# Patient Record
Sex: MALE | Race: WHITE | HISPANIC OR LATINO | Employment: FULL TIME | URBAN - METROPOLITAN AREA
[De-identification: names, ages, dates, MRNs, and addresses within clinical notes are randomized per-mention and may not be internally consistent; named-entity substitution may affect disease eponyms.]

---

## 2024-06-26 ENCOUNTER — HOSPITAL ENCOUNTER (INPATIENT)
Facility: HOSPITAL | Age: 69
End: 2024-06-26
Attending: INTERNAL MEDICINE | Admitting: INTERNAL MEDICINE
Payer: COMMERCIAL

## 2024-06-26 ENCOUNTER — HOSPITAL ENCOUNTER (OUTPATIENT)
Facility: HOSPITAL | Age: 69
Setting detail: OUTPATIENT SURGERY
Discharge: ANOTHER HOSPITAL | End: 2024-06-26
Attending: INTERNAL MEDICINE | Admitting: INTERNAL MEDICINE
Payer: COMMERCIAL

## 2024-06-26 ENCOUNTER — APPOINTMENT (OUTPATIENT)
Dept: CARDIOLOGY | Facility: HOSPITAL | Age: 69
DRG: 235 | End: 2024-06-26
Payer: COMMERCIAL

## 2024-06-26 VITALS
HEIGHT: 68 IN | HEART RATE: 61 BPM | DIASTOLIC BLOOD PRESSURE: 65 MMHG | RESPIRATION RATE: 12 BRPM | BODY MASS INDEX: 22.13 KG/M2 | WEIGHT: 146 LBS | SYSTOLIC BLOOD PRESSURE: 117 MMHG | OXYGEN SATURATION: 97 %

## 2024-06-26 DIAGNOSIS — I42.9 CARDIOMYOPATHY, UNSPECIFIED TYPE (MULTI): ICD-10-CM

## 2024-06-26 DIAGNOSIS — I25.119 ATHEROSCLEROTIC HEART DISEASE OF NATIVE CORONARY ARTERY WITH UNSPECIFIED ANGINA PECTORIS (CMS-HCC): ICD-10-CM

## 2024-06-26 DIAGNOSIS — Z95.1 S/P CABG X 2: ICD-10-CM

## 2024-06-26 DIAGNOSIS — I25.5 ISCHEMIC CARDIOMYOPATHY: ICD-10-CM

## 2024-06-26 DIAGNOSIS — I20.9 ANGINA PECTORIS (CMS-HCC): ICD-10-CM

## 2024-06-26 DIAGNOSIS — R93.1 ABNORMAL FINDINGS ON DIAGNOSTIC IMAGING OF HEART AND CORONARY CIRCULATION: ICD-10-CM

## 2024-06-26 DIAGNOSIS — Z03.89 CORONARY ARTERY DISEASE (CAD) EXCLUDED: Primary | ICD-10-CM

## 2024-06-26 DIAGNOSIS — Z01.810 ENCOUNTER FOR PREPROCEDURAL CARDIOVASCULAR EXAMINATION: ICD-10-CM

## 2024-06-26 LAB
ABO GROUP (TYPE) IN BLOOD: NORMAL
ALBUMIN SERPL BCP-MCNC: 4.1 G/DL (ref 3.4–5)
ALP SERPL-CCNC: 66 U/L (ref 33–136)
ALT SERPL W P-5'-P-CCNC: 11 U/L (ref 10–52)
ANION GAP BLDV CALCULATED.4IONS-SCNC: 14 MMOL/L (ref 10–25)
ANION GAP SERPL CALC-SCNC: 15 MMOL/L (ref 10–20)
ANTIBODY SCREEN: NORMAL
APTT PPP: 28 SECONDS (ref 27–38)
AST SERPL W P-5'-P-CCNC: 16 U/L (ref 9–39)
ATRIAL RATE: 68 BPM
BASE EXCESS BLDV CALC-SCNC: -0.9 MMOL/L (ref -2–3)
BASOPHILS # BLD AUTO: 0.06 X10*3/UL (ref 0–0.1)
BASOPHILS NFR BLD AUTO: 0.9 %
BILIRUB SERPL-MCNC: 0.6 MG/DL (ref 0–1.2)
BODY TEMPERATURE: 37 DEGREES CELSIUS
BUN SERPL-MCNC: 22 MG/DL (ref 6–23)
CA-I BLDV-SCNC: 1.18 MMOL/L (ref 1.1–1.33)
CALCIUM SERPL-MCNC: 9.2 MG/DL (ref 8.6–10.6)
CHLORIDE BLDV-SCNC: 102 MMOL/L (ref 98–107)
CHLORIDE SERPL-SCNC: 103 MMOL/L (ref 98–107)
CHOLEST SERPL-MCNC: 132 MG/DL (ref 0–199)
CHOLESTEROL/HDL RATIO: 4.5
CO2 SERPL-SCNC: 24 MMOL/L (ref 21–32)
CREAT SERPL-MCNC: 0.87 MG/DL (ref 0.5–1.3)
EGFRCR SERPLBLD CKD-EPI 2021: >90 ML/MIN/1.73M*2
EOSINOPHIL # BLD AUTO: 0.17 X10*3/UL (ref 0–0.7)
EOSINOPHIL NFR BLD AUTO: 2.5 %
ERYTHROCYTE [DISTWIDTH] IN BLOOD BY AUTOMATED COUNT: 13.2 % (ref 11.5–14.5)
EST. AVERAGE GLUCOSE BLD GHB EST-MCNC: 117 MG/DL
GLUCOSE BLD MANUAL STRIP-MCNC: 85 MG/DL (ref 74–99)
GLUCOSE BLDV-MCNC: 79 MG/DL (ref 74–99)
GLUCOSE SERPL-MCNC: 70 MG/DL (ref 74–99)
HBA1C MFR BLD: 5.7 %
HCO3 BLDV-SCNC: 24.3 MMOL/L (ref 22–26)
HCT VFR BLD AUTO: 43.9 % (ref 41–52)
HCT VFR BLD EST: 43 % (ref 41–52)
HDLC SERPL-MCNC: 29.3 MG/DL
HGB BLD-MCNC: 14.6 G/DL (ref 13.5–17.5)
HGB BLDV-MCNC: 14.4 G/DL (ref 13.5–17.5)
IMM GRANULOCYTES # BLD AUTO: 0.02 X10*3/UL (ref 0–0.7)
IMM GRANULOCYTES NFR BLD AUTO: 0.3 % (ref 0–0.9)
INHALED O2 CONCENTRATION: 21 %
INR PPP: 1 (ref 0.9–1.1)
LACTATE BLDV-SCNC: 1.3 MMOL/L (ref 0.4–2)
LDLC SERPL CALC-MCNC: 69 MG/DL
LYMPHOCYTES # BLD AUTO: 2.16 X10*3/UL (ref 1.2–4.8)
LYMPHOCYTES NFR BLD AUTO: 31.8 %
MAGNESIUM SERPL-MCNC: 2.48 MG/DL (ref 1.6–2.4)
MCH RBC QN AUTO: 30.9 PG (ref 26–34)
MCHC RBC AUTO-ENTMCNC: 33.3 G/DL (ref 32–36)
MCV RBC AUTO: 93 FL (ref 80–100)
MONOCYTES # BLD AUTO: 0.47 X10*3/UL (ref 0.1–1)
MONOCYTES NFR BLD AUTO: 6.9 %
NEUTROPHILS # BLD AUTO: 3.92 X10*3/UL (ref 1.2–7.7)
NEUTROPHILS NFR BLD AUTO: 57.6 %
NON HDL CHOLESTEROL: 103 MG/DL (ref 0–149)
NRBC BLD-RTO: 0 /100 WBCS (ref 0–0)
OXYHGB MFR BLDV: 88.6 % (ref 45–75)
P AXIS: 9 DEGREES
P OFFSET: 173 MS
P ONSET: 115 MS
PCO2 BLDV: 41 MM HG (ref 41–51)
PH BLDV: 7.38 PH (ref 7.33–7.43)
PHOSPHATE SERPL-MCNC: 3.5 MG/DL (ref 2.5–4.9)
PLATELET # BLD AUTO: 172 X10*3/UL (ref 150–450)
PO2 BLDV: 67 MM HG (ref 35–45)
POTASSIUM BLDV-SCNC: 4.5 MMOL/L (ref 3.5–5.3)
POTASSIUM SERPL-SCNC: 4.8 MMOL/L (ref 3.5–5.3)
PR INTERVAL: 214 MS
PROT SERPL-MCNC: 7.2 G/DL (ref 6.4–8.2)
PROTHROMBIN TIME: 11.3 SECONDS (ref 9.8–12.8)
Q ONSET: 222 MS
QRS COUNT: 11 BEATS
QRS DURATION: 88 MS
QT INTERVAL: 430 MS
QTC CALCULATION(BAZETT): 457 MS
QTC FREDERICIA: 448 MS
R AXIS: 22 DEGREES
RBC # BLD AUTO: 4.73 X10*6/UL (ref 4.5–5.9)
RH FACTOR (ANTIGEN D): NORMAL
SAO2 % BLDV: 95 % (ref 45–75)
SODIUM BLDV-SCNC: 136 MMOL/L (ref 136–145)
SODIUM SERPL-SCNC: 137 MMOL/L (ref 136–145)
T AXIS: 151 DEGREES
T OFFSET: 437 MS
TRIGL SERPL-MCNC: 167 MG/DL (ref 0–149)
UFH PPP CHRO-ACNC: 0.2 IU/ML
VENTRICULAR RATE: 68 BPM
VLDL: 33 MG/DL (ref 0–40)
WBC # BLD AUTO: 6.8 X10*3/UL (ref 4.4–11.3)

## 2024-06-26 PROCEDURE — 93454 CORONARY ARTERY ANGIO S&I: CPT | Performed by: INTERNAL MEDICINE

## 2024-06-26 PROCEDURE — 99291 CRITICAL CARE FIRST HOUR: CPT | Performed by: INTERNAL MEDICINE

## 2024-06-26 PROCEDURE — 85025 COMPLETE CBC W/AUTO DIFF WBC: CPT

## 2024-06-26 PROCEDURE — 83036 HEMOGLOBIN GLYCOSYLATED A1C: CPT

## 2024-06-26 PROCEDURE — 84132 ASSAY OF SERUM POTASSIUM: CPT

## 2024-06-26 PROCEDURE — 36415 COLL VENOUS BLD VENIPUNCTURE: CPT

## 2024-06-26 PROCEDURE — 93005 ELECTROCARDIOGRAM TRACING: CPT

## 2024-06-26 PROCEDURE — 80061 LIPID PANEL: CPT

## 2024-06-26 PROCEDURE — C1760 CLOSURE DEV, VASC: HCPCS | Performed by: INTERNAL MEDICINE

## 2024-06-26 PROCEDURE — 2720000007 HC OR 272 NO HCPCS: Performed by: INTERNAL MEDICINE

## 2024-06-26 PROCEDURE — 84484 ASSAY OF TROPONIN QUANT: CPT

## 2024-06-26 PROCEDURE — 2550000001 HC RX 255 CONTRASTS: Performed by: INTERNAL MEDICINE

## 2024-06-26 PROCEDURE — 2500000004 HC RX 250 GENERAL PHARMACY W/ HCPCS (ALT 636 FOR OP/ED): Performed by: INTERNAL MEDICINE

## 2024-06-26 PROCEDURE — 84443 ASSAY THYROID STIM HORMONE: CPT | Performed by: STUDENT IN AN ORGANIZED HEALTH CARE EDUCATION/TRAINING PROGRAM

## 2024-06-26 PROCEDURE — C1894 INTRO/SHEATH, NON-LASER: HCPCS | Performed by: INTERNAL MEDICINE

## 2024-06-26 PROCEDURE — 2500000001 HC RX 250 WO HCPCS SELF ADMINISTERED DRUGS (ALT 637 FOR MEDICARE OP): Performed by: INTERNAL MEDICINE

## 2024-06-26 PROCEDURE — 85520 HEPARIN ASSAY: CPT

## 2024-06-26 PROCEDURE — 84439 ASSAY OF FREE THYROXINE: CPT | Performed by: STUDENT IN AN ORGANIZED HEALTH CARE EDUCATION/TRAINING PROGRAM

## 2024-06-26 PROCEDURE — 2020000001 HC ICU ROOM DAILY

## 2024-06-26 PROCEDURE — 2500000005 HC RX 250 GENERAL PHARMACY W/O HCPCS: Performed by: INTERNAL MEDICINE

## 2024-06-26 PROCEDURE — 85027 COMPLETE CBC AUTOMATED: CPT | Performed by: STUDENT IN AN ORGANIZED HEALTH CARE EDUCATION/TRAINING PROGRAM

## 2024-06-26 PROCEDURE — 2780000003 HC OR 278 NO HCPCS: Performed by: INTERNAL MEDICINE

## 2024-06-26 PROCEDURE — 83735 ASSAY OF MAGNESIUM: CPT

## 2024-06-26 PROCEDURE — 86850 RBC ANTIBODY SCREEN: CPT | Performed by: INTERNAL MEDICINE

## 2024-06-26 PROCEDURE — 7100000009 HC PHASE TWO TIME - INITIAL BASE CHARGE: Performed by: INTERNAL MEDICINE

## 2024-06-26 PROCEDURE — 82947 ASSAY GLUCOSE BLOOD QUANT: CPT

## 2024-06-26 PROCEDURE — G0269 OCCLUSIVE DEVICE IN VEIN ART: HCPCS | Mod: TC | Performed by: INTERNAL MEDICINE

## 2024-06-26 PROCEDURE — 2500000004 HC RX 250 GENERAL PHARMACY W/ HCPCS (ALT 636 FOR OP/ED)

## 2024-06-26 PROCEDURE — 2500000002 HC RX 250 W HCPCS SELF ADMINISTERED DRUGS (ALT 637 FOR MEDICARE OP, ALT 636 FOR OP/ED)

## 2024-06-26 PROCEDURE — 7100000010 HC PHASE TWO TIME - EACH INCREMENTAL 1 MINUTE: Performed by: INTERNAL MEDICINE

## 2024-06-26 PROCEDURE — 85610 PROTHROMBIN TIME: CPT

## 2024-06-26 PROCEDURE — 84100 ASSAY OF PHOSPHORUS: CPT

## 2024-06-26 PROCEDURE — 86923 COMPATIBILITY TEST ELECTRIC: CPT

## 2024-06-26 PROCEDURE — 86901 BLOOD TYPING SEROLOGIC RH(D): CPT | Performed by: INTERNAL MEDICINE

## 2024-06-26 PROCEDURE — 99152 MOD SED SAME PHYS/QHP 5/>YRS: CPT | Performed by: INTERNAL MEDICINE

## 2024-06-26 RX ORDER — FENTANYL CITRATE 50 UG/ML
INJECTION, SOLUTION INTRAMUSCULAR; INTRAVENOUS AS NEEDED
Status: DISCONTINUED | OUTPATIENT
Start: 2024-06-26 | End: 2024-06-26 | Stop reason: HOSPADM

## 2024-06-26 RX ORDER — DEXTROSE 50 % IN WATER (D50W) INTRAVENOUS SYRINGE
12.5
Status: DISCONTINUED | OUTPATIENT
Start: 2024-06-26 | End: 2024-06-28

## 2024-06-26 RX ORDER — SODIUM CHLORIDE 9 MG/ML
INJECTION, SOLUTION INTRAVENOUS CONTINUOUS PRN
Status: DISCONTINUED | OUTPATIENT
Start: 2024-06-26 | End: 2024-06-26 | Stop reason: HOSPADM

## 2024-06-26 RX ORDER — ASPIRIN 81 MG/1
81 TABLET ORAL DAILY
Status: DISCONTINUED | OUTPATIENT
Start: 2024-06-26 | End: 2024-06-28

## 2024-06-26 RX ORDER — METOPROLOL SUCCINATE 25 MG/1
25 TABLET, EXTENDED RELEASE ORAL DAILY
Status: ON HOLD | COMMUNITY

## 2024-06-26 RX ORDER — NITROGLYCERIN 0.4 MG/1
TABLET SUBLINGUAL AS NEEDED
Status: DISCONTINUED | OUTPATIENT
Start: 2024-06-26 | End: 2024-06-26 | Stop reason: HOSPADM

## 2024-06-26 RX ORDER — ROSUVASTATIN CALCIUM 10 MG/1
10 TABLET, COATED ORAL DAILY
Status: ON HOLD | COMMUNITY

## 2024-06-26 RX ORDER — ASPIRIN 81 MG/1
81 TABLET ORAL DAILY
Status: ON HOLD | COMMUNITY

## 2024-06-26 RX ORDER — LIDOCAINE HYDROCHLORIDE 20 MG/ML
INJECTION, SOLUTION INFILTRATION; PERINEURAL AS NEEDED
Status: DISCONTINUED | OUTPATIENT
Start: 2024-06-26 | End: 2024-06-26 | Stop reason: HOSPADM

## 2024-06-26 RX ORDER — NITROGLYCERIN 0.4 MG/1
0.4 TABLET SUBLINGUAL EVERY 5 MIN PRN
Status: ON HOLD | COMMUNITY

## 2024-06-26 RX ORDER — DEXTROSE 50 % IN WATER (D50W) INTRAVENOUS SYRINGE
25
Status: DISCONTINUED | OUTPATIENT
Start: 2024-06-26 | End: 2024-06-28

## 2024-06-26 RX ORDER — INSULIN LISPRO 100 [IU]/ML
0-5 INJECTION, SOLUTION INTRAVENOUS; SUBCUTANEOUS
Status: DISCONTINUED | OUTPATIENT
Start: 2024-06-26 | End: 2024-06-28

## 2024-06-26 RX ORDER — HEPARIN SODIUM 10000 [USP'U]/100ML
INJECTION, SOLUTION INTRAVENOUS
Status: DISPENSED
Start: 2024-06-26 | End: 2024-06-27

## 2024-06-26 RX ORDER — MIDAZOLAM HYDROCHLORIDE 1 MG/ML
INJECTION, SOLUTION INTRAMUSCULAR; INTRAVENOUS AS NEEDED
Status: DISCONTINUED | OUTPATIENT
Start: 2024-06-26 | End: 2024-06-26 | Stop reason: HOSPADM

## 2024-06-26 RX ORDER — METOPROLOL SUCCINATE 25 MG/1
25 TABLET, EXTENDED RELEASE ORAL DAILY
Status: DISCONTINUED | OUTPATIENT
Start: 2024-06-26 | End: 2024-06-27

## 2024-06-26 RX ORDER — ASPIRIN 325 MG
325 TABLET ORAL DAILY
Status: DISCONTINUED | OUTPATIENT
Start: 2024-06-26 | End: 2024-06-26 | Stop reason: HOSPADM

## 2024-06-26 RX ORDER — ROSUVASTATIN CALCIUM 40 MG/1
40 TABLET, COATED ORAL NIGHTLY
Status: DISCONTINUED | OUTPATIENT
Start: 2024-06-26 | End: 2024-06-28

## 2024-06-26 RX ORDER — HEPARIN SODIUM 10000 [USP'U]/100ML
0-4000 INJECTION, SOLUTION INTRAVENOUS CONTINUOUS
Status: DISCONTINUED | OUTPATIENT
Start: 2024-06-26 | End: 2024-06-28

## 2024-06-26 SDOH — HEALTH STABILITY: PHYSICAL HEALTH: ON AVERAGE, HOW MANY MINUTES DO YOU ENGAGE IN EXERCISE AT THIS LEVEL?: 30 MIN

## 2024-06-26 SDOH — SOCIAL STABILITY: SOCIAL INSECURITY: ABUSE: ADULT

## 2024-06-26 SDOH — SOCIAL STABILITY: SOCIAL INSECURITY: DO YOU FEEL ANYONE HAS EXPLOITED OR TAKEN ADVANTAGE OF YOU FINANCIALLY OR OF YOUR PERSONAL PROPERTY?: NO

## 2024-06-26 SDOH — SOCIAL STABILITY: SOCIAL INSECURITY: DOES ANYONE TRY TO KEEP YOU FROM HAVING/CONTACTING OTHER FRIENDS OR DOING THINGS OUTSIDE YOUR HOME?: NO

## 2024-06-26 SDOH — SOCIAL STABILITY: SOCIAL INSECURITY: DO YOU FEEL UNSAFE GOING BACK TO THE PLACE WHERE YOU ARE LIVING?: NO

## 2024-06-26 SDOH — SOCIAL STABILITY: SOCIAL INSECURITY: WERE YOU ABLE TO COMPLETE ALL THE BEHAVIORAL HEALTH SCREENINGS?: NO

## 2024-06-26 SDOH — SOCIAL STABILITY: SOCIAL INSECURITY: HAVE YOU HAD THOUGHTS OF HARMING ANYONE ELSE?: NO

## 2024-06-26 SDOH — HEALTH STABILITY: PHYSICAL HEALTH: ON AVERAGE, HOW MANY DAYS PER WEEK DO YOU ENGAGE IN MODERATE TO STRENUOUS EXERCISE (LIKE A BRISK WALK)?: 3 DAYS

## 2024-06-26 SDOH — SOCIAL STABILITY: SOCIAL INSECURITY: ARE YOU OR HAVE YOU BEEN THREATENED OR ABUSED PHYSICALLY, EMOTIONALLY, OR SEXUALLY BY ANYONE?: NO

## 2024-06-26 SDOH — HEALTH STABILITY: MENTAL HEALTH: EXPERIENCED ANY OF THE FOLLOWING LIFE EVENTS: OTHER (COMMENT)

## 2024-06-26 SDOH — SOCIAL STABILITY: SOCIAL INSECURITY: HAVE YOU HAD ANY THOUGHTS OF HARMING ANYONE ELSE?: NO

## 2024-06-26 SDOH — SOCIAL STABILITY: SOCIAL INSECURITY: HAS ANYONE EVER THREATENED TO HURT YOUR FAMILY OR YOUR PETS?: NO

## 2024-06-26 SDOH — SOCIAL STABILITY: SOCIAL INSECURITY: ARE THERE ANY APPARENT SIGNS OF INJURIES/BEHAVIORS THAT COULD BE RELATED TO ABUSE/NEGLECT?: NO

## 2024-06-26 ASSESSMENT — PAIN SCALES - GENERAL
PAINLEVEL_OUTOF10: 0 - NO PAIN

## 2024-06-26 ASSESSMENT — ACTIVITIES OF DAILY LIVING (ADL)
ADEQUATE_TO_COMPLETE_ADL: YES
FEEDING YOURSELF: INDEPENDENT
PATIENT'S MEMORY ADEQUATE TO SAFELY COMPLETE DAILY ACTIVITIES?: YES
BATHING: INDEPENDENT
JUDGMENT_ADEQUATE_SAFELY_COMPLETE_DAILY_ACTIVITIES: YES
TOILETING: INDEPENDENT
DRESSING YOURSELF: INDEPENDENT
HEARING - RIGHT EAR: HEARING AID
GROOMING: INDEPENDENT
HEARING - LEFT EAR: FUNCTIONAL
LACK_OF_TRANSPORTATION: PATIENT UNABLE TO ANSWER
ASSISTIVE_DEVICE: HEARING AID - RIGHT
WALKS IN HOME: INDEPENDENT

## 2024-06-26 ASSESSMENT — LIFESTYLE VARIABLES
HOW OFTEN DO YOU HAVE A DRINK CONTAINING ALCOHOL: NEVER
SKIP TO QUESTIONS 9-10: 1
AUDIT-C TOTAL SCORE: 0
AUDIT-C TOTAL SCORE: 0
HOW MANY STANDARD DRINKS CONTAINING ALCOHOL DO YOU HAVE ON A TYPICAL DAY: PATIENT DOES NOT DRINK
SUBSTANCE_ABUSE_PAST_12_MONTHS: NO
HOW OFTEN DO YOU HAVE 6 OR MORE DRINKS ON ONE OCCASION: NEVER
PRESCIPTION_ABUSE_PAST_12_MONTHS: NO

## 2024-06-26 ASSESSMENT — COGNITIVE AND FUNCTIONAL STATUS - GENERAL
PATIENT BASELINE BEDBOUND: NO
MOVING TO AND FROM BED TO CHAIR: A LITTLE
HELP NEEDED FOR BATHING: A LITTLE
EATING MEALS: A LITTLE
TOILETING: A LITTLE
MOVING FROM LYING ON BACK TO SITTING ON SIDE OF FLAT BED WITH BEDRAILS: A LITTLE
TURNING FROM BACK TO SIDE WHILE IN FLAT BAD: A LITTLE
PERSONAL GROOMING: A LITTLE
WALKING IN HOSPITAL ROOM: A LITTLE
CLIMB 3 TO 5 STEPS WITH RAILING: A LITTLE
DAILY ACTIVITIY SCORE: 18
DRESSING REGULAR LOWER BODY CLOTHING: A LITTLE
DRESSING REGULAR UPPER BODY CLOTHING: A LITTLE
STANDING UP FROM CHAIR USING ARMS: A LITTLE
MOBILITY SCORE: 18

## 2024-06-26 ASSESSMENT — COLUMBIA-SUICIDE SEVERITY RATING SCALE - C-SSRS
1. IN THE PAST MONTH, HAVE YOU WISHED YOU WERE DEAD OR WISHED YOU COULD GO TO SLEEP AND NOT WAKE UP?: NO
6. HAVE YOU EVER DONE ANYTHING, STARTED TO DO ANYTHING, OR PREPARED TO DO ANYTHING TO END YOUR LIFE?: NO
6. HAVE YOU EVER DONE ANYTHING, STARTED TO DO ANYTHING, OR PREPARED TO DO ANYTHING TO END YOUR LIFE?: NO
2. HAVE YOU ACTUALLY HAD ANY THOUGHTS OF KILLING YOURSELF?: NO
1. IN THE PAST MONTH, HAVE YOU WISHED YOU WERE DEAD OR WISHED YOU COULD GO TO SLEEP AND NOT WAKE UP?: NO
2. HAVE YOU ACTUALLY HAD ANY THOUGHTS OF KILLING YOURSELF?: NO

## 2024-06-26 ASSESSMENT — PATIENT HEALTH QUESTIONNAIRE - PHQ9
2. FEELING DOWN, DEPRESSED OR HOPELESS: NOT AT ALL
1. LITTLE INTEREST OR PLEASURE IN DOING THINGS: NOT AT ALL
SUM OF ALL RESPONSES TO PHQ9 QUESTIONS 1 & 2: 0

## 2024-06-26 ASSESSMENT — PAIN - FUNCTIONAL ASSESSMENT
PAIN_FUNCTIONAL_ASSESSMENT: 0-10

## 2024-06-26 NOTE — POST-PROCEDURE NOTE
Physician Transition of Care Summary  Invasive Cardiovascular Lab    Procedure Date: 6/26/2024  Attending:    * Dagoberto Washburn - Primary  Resident/Fellow/Other Assistant: Surgeons and Role:  * No surgeons found with a matching role *    Indications:   Pre-op Diagnosis     * Angina pectoris (CMS-HCC) [I20.9]     * Cardiomyopathy, unspecified type (Multi) [I42.9]    Post-procedure diagnosis:   Post-op Diagnosis     * Angina pectoris (CMS-HCC) [I20.9]     * Cardiomyopathy, unspecified type (Multi) [I42.9]    Procedure(s):   Left Heart Cath with Coronary Angiography and LV  26952 - ND CATH PLMT L HRT & ARTS W/NJX & ANGIO IMG S&I        Procedure Findings:   CRITICAL LEFT MAIN DISEASE    Description of the Procedure:   [facility]    Complications:   NONE    Stents/Implants:   Implants       No implant documentation for this case.            Anticoagulation/Antiplatelet Plan:   ASA    Estimated Blood Loss:   * No values recorded between 6/26/2024 12:32 PM and 6/26/2024  1:07 PM *    Anesthesia: Moderate Sedation Anesthesia Staff: No anesthesia staff entered.    Any Specimen(s) Removed:   No specimens collected during this procedure.    Disposition:   HOME      Electronically signed by: Dagoberto Washburn MD, 6/26/2024 1:07 PM

## 2024-06-26 NOTE — H&P
History Of Present Illness  Felice Almonte is a 69 y.o. male presenting with angina, dyspnea, abnormal stress test, ischemic cardiomyopathy.     Past Medical History  Past Medical History:   Diagnosis Date    Hyperlipidemia        Surgical History  History reviewed. No pertinent surgical history.     Social History  He reports that he has quit smoking. His smoking use included cigarettes. He does not have any smokeless tobacco history on file. He reports that he does not drink alcohol. No history on file for drug use.    Family History  No family history on file.     Allergies  Patient has no known allergies.    Review of Systems   Comprehensive 10-point review of systems negative otherwise as noted above in HPI    Physical Exam   Constitutional: Well developed, awake/alert/oriented x3, no distress, alert and cooperative  Eyes: PERRL, EOMI, clear sclera  ENMT: mucous membranes moist, no apparent injury, no lesions seen  Head/Neck: Neck supple, no apparent injury, thyroid without mass or tenderness, No JVD, trachea midline, no bruits  Respiratory/Thorax: Patent airways, CTAB, normal breath sounds with good chest expansion, thorax symmetric  Cardiovascular: Regular, rate and rhythm, no murmurs, 2+ equal pulses of the extremities, normal S 1and S 2  Gastrointestinal: Nondistended, soft, non-tender, no rebound tenderness or guarding, no masses palpable, no organomegaly, +BS, no bruits  Musculoskeletal: ROM intact, no joint swelling, normal strength  Extremities: normal extremities, no cyanosis edema, contusions or wounds, no clubbing  Neurological: alert and oriented x3, intact senses, motor, response and reflexes, normal strength  Lymphatic: No significant lymphadenopathy  Psychological: Appropriate mood and behavior  Skin: Warm and dry, no lesions, no rashes    Last Recorded Vitals  There were no vitals taken for this visit.    Relevant Results        Please see chart notes for details     Assessment/Plan   Active  Problems:  There are no active Hospital Problems.      Consent obtained for coronary angiography       I spent 15 minutes in the professional and overall care of this patient.      Dagoberto Washburn MD

## 2024-06-26 NOTE — H&P
HISTORY OF PRESENT ILLNESS  ====================================  Mr. Felice Almonte is a 70 yo M with PMHx of DMT2, HTN, who presents as a transfer from [facility] following LHC which demonstrated critical LM disease.     Pt states he has noticed chest pain, on and off, for approximately 10 months. He describes the pain as substernal, burning pain, usually exacerbated with exertion, and improves with 10-15 minutes rest. Also endorses some associated SOB. Denies any radiation of the pain, diaphoresis, nausea/vomiting, or any other symptoms. He states the symptoms have worsened recently, and were most notable last week Tuesday while he was working. He is very physically active, and has been somewhat limited by the symptoms recently, so his sister arranged for him to have a LHC. Denies any recent bilateral lower extremity edema.    Also denies any recent fevers/chills, cough, abdominal pain, constipation/diarrhea, urinary changes.         OSH Course:  VS:   Vitals:    06/26/24 1700   BP: (!) 139/91   Pulse: 70   Resp: 19   Temp:    SpO2: 98%         Imaging:   No results found.    [facility] 6/26/24  Coronary Lesion Summary:  Vessel      Stenosis     Vessel Segment  Left Main 95% stenosis ostial and proximal       Left Main Coronary Artery:  The left main coronary artery is a medium-sized caliber vessel. The left main arises normally from the left coronary sinus of Valsalva. The left main coronary artery showed severe ostial to proximal atherosclerotic disease. The ostial and proximal left main coronary artery showed 95% stenosis.  This lesion was focal and moderately calcified.     Left Anterior Descending Coronary Artery Distribution:  The left anterior descending coronary artery is a large caliber vessel. The LAD arises normally from the left main coronary artery. The LAD demonstrated diffuse moderate atherosclerotic disease.     Circumflex Coronary Artery Distribution:  The circumflex coronary artery is a large caliber vessel.  The circumflex arises normally from the left main coronary artery. The circumflex revealed no significant disease or stenosis greater than 30%.     Right Coronary Artery Distribution:     The right coronary artery is a large caliber vessel. The RCA arises normally from the right sinus of Valsalva. The RCA showed no significant disease or stenosis greater than 30% and mild proximal atherosclerotic disease.     Coronary Lesion Summary:  Vessel      Stenosis     Vessel Segment  Left Main 95% stenosis ostial and proximal        Subclavian Artery Findings:     Left Subclavian Artery:  The left subclavian artery is a large caliber vessel. The left subclavian artery revealed no evidence of significant disease. Large left subclavian artery gives origin to large LIMA, a suitable graft conduit.    EKG: sinus rhythm with 1st degree AV block (), no priors, evidence of LVH    Interventions:   Started on hep gtt        PAST MEDICAL HISTORY  ====================================  As above    PAST SURGICAL HISTORY  ====================================  As above    MEDICATIONS  ====================================    Current Facility-Administered Medications on File Prior to Encounter   Medication Dose Route Frequency Provider Last Rate Last Admin    [DISCONTINUED] aspirin tablet 325 mg  325 mg oral Daily Dagoberto Washburn MD   325 mg at 06/26/24 1335    [DISCONTINUED] fentaNYL PF (Sublimaze) injection    PRN Dagoberto Washburn MD   50 mcg at 06/26/24 1246    [DISCONTINUED] iohexol (OMNIPaque) 300 mg iodine/mL solution    PRN Dagoberto Washburn MD   60 mL at 06/26/24 1304    [DISCONTINUED] lidocaine (Xylocaine) 20 mg/mL (2 %) injection    PRN Dagoberto Washburn MD   10 mL at 06/26/24 1246    [DISCONTINUED] midazolam (Versed) injection    PRN Dagoberto Washburn MD   1 mg at 06/26/24 1246    [DISCONTINUED] nitroglycerin (Nitrostat) SL tablet    PRN Dagoberto Washburn MD   0.4 mg at 06/26/24 1257     [DISCONTINUED] oxygen (O2) therapy    Continuous PRN Dagoberto Washburn MD   Stopped at 06/26/24 1310    [DISCONTINUED] sodium chloride 0.9 % bolus    Continuous PRN Dagoberto Washburn MD   Stopped at 06/26/24 1310    [DISCONTINUED] sodium chloride 0.9% infusion    Continuous PRPAZ Washburn MD   Stopped at 06/26/24 1310     Current Outpatient Medications on File Prior to Encounter   Medication Sig Dispense Refill    aspirin 81 mg EC tablet Take 1 tablet (81 mg) by mouth once daily.      metoprolol succinate XL (Toprol-XL) 25 mg 24 hr tablet Take 1 tablet (25 mg) by mouth once daily. Do not crush or chew.      nitroglycerin (Nitrostat) 0.4 mg SL tablet Place 1 tablet (0.4 mg) under the tongue every 5 minutes if needed for chest pain.      rosuvastatin (Crestor) 10 mg tablet Take 1 tablet (10 mg) by mouth once daily.           ALLERGIES  ====================================  NKDA    FAMILY HX  ====================================  Reviewed and NC    SOCIAL HX  ====================================  - Living Situation: Independent  - Functional Status: very active  - Tobacco: smokeless tobacco 3-4 packs/week x50 years  - Alcohol: denies  - Drugs: denies    REVIEW OF SYSTEMS:   ====================================  12 Point ROS otherwise negative    PHYSICAL EXAM:  ====================================  General: well-developed, well-nourished, no acute distress, AAOx3  HEENT: EOM intact, PERRL, no JVD  CV: regular rate and rhythm, normal S1/S2, no murmur, gallop, or rub, prominent PMI  Pulm: normal respiratory effort, clear to auscultation bilaterally with no wheezes, rales, rhonchi  Abd: soft, nontender, nondistended, no masses, normoactive bowel sounds  Extremities: warm, no LE edema  Neuro: moves all extremities equally, CN II - XII grossly intact  Psych: normal mood and affect  Skin: warm, dry, intact      LABS:  ====================================    Results for orders placed or performed during  the hospital encounter of 06/26/24 (from the past 24 hour(s))   Blood Gas Venous Full Panel   Result Value Ref Range    POCT pH, Venous 7.38 7.33 - 7.43 pH    POCT pCO2, Venous 41 41 - 51 mm Hg    POCT pO2, Venous 67 (H) 35 - 45 mm Hg    POCT SO2, Venous 95 (H) 45 - 75 %    POCT Oxy Hemoglobin, Venous 88.6 (H) 45.0 - 75.0 %    POCT Hematocrit Calculated, Venous 43.0 41.0 - 52.0 %    POCT Sodium, Venous 136 136 - 145 mmol/L    POCT Potassium, Venous 4.5 3.5 - 5.3 mmol/L    POCT Chloride, Venous 102 98 - 107 mmol/L    POCT Ionized Calicum, Venous 1.18 1.10 - 1.33 mmol/L    POCT Glucose, Venous 79 74 - 99 mg/dL    POCT Lactate, Venous 1.3 0.4 - 2.0 mmol/L    POCT Base Excess, Venous -0.9 -2.0 - 3.0 mmol/L    POCT HCO3 Calculated, Venous 24.3 22.0 - 26.0 mmol/L    POCT Hemoglobin, Venous 14.4 13.5 - 17.5 g/dL    POCT Anion Gap, Venous 14.0 10.0 - 25.0 mmol/L    Patient Temperature 37.0 degrees Celsius    FiO2 21 %         ASSESSMENT & PLAN  ====================================  Mr. Felice Almonte is a 68 yo M with PMHx of DMT2, HTN, who presents as a transfer from [facility] following [facility] which demonstrated critical LM disease. Will start pt on hep gtt, and initiate nitroglycerin gtt as needed for chest pain. Will plan for cardiac surgery consult for CABG evaluation.    Neuro  No active issues    Cardiac  #CAD with critical LM disease seen on [facility] 6/26  -Loaded with , continue 81mg daily  -Hep gtt  -TTE  -Rosuvastatin 40mg daily  -nitroglycerin gtt as needed for chest pain  -Cardiac surgery consult    Pulm  No active issues    Endo  #DMT2  -Check Hgb A1c  -SSI    GI  No active issues    Renal  No active issues    ID  No active issues    MSK/Rheum  No active issues      F: bolus PRN  E: replete PRN  N: Cardiac/diabetic  A: PIV    DVT PPx: SCDs, hep gtt  GI PPx: N/A    CODE STATUS: Full code (confirmed on admission)  SURROGATE DECISION MAKER: Ariadna Saldana (niece) [phone]    Pt to be discussed with attending physician  in AM.    Roger Maldonado MD

## 2024-06-27 ENCOUNTER — APPOINTMENT (OUTPATIENT)
Dept: CARDIOLOGY | Facility: HOSPITAL | Age: 69
DRG: 235 | End: 2024-06-27
Payer: COMMERCIAL

## 2024-06-27 ENCOUNTER — ANESTHESIA EVENT (OUTPATIENT)
Dept: OPERATING ROOM | Facility: HOSPITAL | Age: 69
End: 2024-06-27
Payer: COMMERCIAL

## 2024-06-27 ENCOUNTER — APPOINTMENT (OUTPATIENT)
Dept: RADIOLOGY | Facility: HOSPITAL | Age: 69
DRG: 235 | End: 2024-06-27
Payer: COMMERCIAL

## 2024-06-27 ENCOUNTER — APPOINTMENT (OUTPATIENT)
Dept: CARDIOLOGY | Facility: HOSPITAL | Age: 69
End: 2024-06-27
Payer: COMMERCIAL

## 2024-06-27 PROBLEM — I25.110 CORONARY ARTERY DISEASE INVOLVING NATIVE CORONARY ARTERY OF NATIVE HEART WITH UNSTABLE ANGINA PECTORIS (MULTI): Status: ACTIVE | Noted: 2024-06-27

## 2024-06-27 PROBLEM — I42.9 MYOCARDIOPATHY (MULTI): Status: ACTIVE | Noted: 2024-06-27

## 2024-06-27 PROBLEM — Z03.89 CORONARY ARTERY DISEASE (CAD) EXCLUDED: Status: ACTIVE | Noted: 2024-06-26

## 2024-06-27 PROBLEM — R73.03 PREDIABETES: Status: ACTIVE | Noted: 2024-06-27

## 2024-06-27 LAB
ABO GROUP (TYPE) IN BLOOD: NORMAL
ALBUMIN SERPL BCP-MCNC: 3.8 G/DL (ref 3.4–5)
ALBUMIN SERPL BCP-MCNC: 3.9 G/DL (ref 3.4–5)
ALBUMIN SERPL BCP-MCNC: 3.9 G/DL (ref 3.4–5)
ALP SERPL-CCNC: 66 U/L (ref 33–136)
ALP SERPL-CCNC: 66 U/L (ref 33–136)
ALT SERPL W P-5'-P-CCNC: 10 U/L (ref 10–52)
ALT SERPL W P-5'-P-CCNC: 12 U/L (ref 10–52)
ALT SERPL W P-5'-P-CCNC: 9 U/L (ref 10–52)
ANION GAP SERPL CALC-SCNC: 14 MMOL/L (ref 10–20)
ANION GAP SERPL CALC-SCNC: 19 MMOL/L (ref 10–20)
ANTIBODY SCREEN: NORMAL
AORTIC VALVE PEAK VELOCITY: 0.98 M/S
APPEARANCE UR: CLEAR
APTT PPP: 38 SECONDS (ref 27–38)
AST SERPL W P-5'-P-CCNC: 14 U/L (ref 9–39)
AST SERPL W P-5'-P-CCNC: 14 U/L (ref 9–39)
AST SERPL W P-5'-P-CCNC: 15 U/L (ref 9–39)
AV PEAK GRADIENT: 3.9 MMHG
AVA (PEAK VEL): 3.13 CM2
BASOPHILS # BLD AUTO: 0.08 X10*3/UL (ref 0–0.1)
BASOPHILS NFR BLD AUTO: 1 %
BILIRUB DIRECT SERPL-MCNC: 0.1 MG/DL (ref 0–0.3)
BILIRUB SERPL-MCNC: 0.4 MG/DL (ref 0–1.2)
BILIRUB SERPL-MCNC: 0.6 MG/DL (ref 0–1.2)
BILIRUB UR STRIP.AUTO-MCNC: NEGATIVE MG/DL
BUN SERPL-MCNC: 27 MG/DL (ref 6–23)
BUN SERPL-MCNC: 27 MG/DL (ref 6–23)
CALCIUM SERPL-MCNC: 8.9 MG/DL (ref 8.6–10.6)
CALCIUM SERPL-MCNC: 9.1 MG/DL (ref 8.6–10.6)
CARDIAC TROPONIN I PNL SERPL HS: 55 NG/L (ref 0–53)
CHLORIDE SERPL-SCNC: 104 MMOL/L (ref 98–107)
CHLORIDE SERPL-SCNC: 104 MMOL/L (ref 98–107)
CO2 SERPL-SCNC: 21 MMOL/L (ref 21–32)
CO2 SERPL-SCNC: 25 MMOL/L (ref 21–32)
COLOR UR: NORMAL
CREAT SERPL-MCNC: 0.98 MG/DL (ref 0.5–1.3)
CREAT SERPL-MCNC: 1.02 MG/DL (ref 0.5–1.3)
EGFRCR SERPLBLD CKD-EPI 2021: 79 ML/MIN/1.73M*2
EGFRCR SERPLBLD CKD-EPI 2021: 83 ML/MIN/1.73M*2
EJECTION FRACTION APICAL 4 CHAMBER: 54.2
EJECTION FRACTION: 40 %
EOSINOPHIL # BLD AUTO: 0.25 X10*3/UL (ref 0–0.7)
EOSINOPHIL NFR BLD AUTO: 3.2 %
ERYTHROCYTE [DISTWIDTH] IN BLOOD BY AUTOMATED COUNT: 13.2 % (ref 11.5–14.5)
ERYTHROCYTE [DISTWIDTH] IN BLOOD BY AUTOMATED COUNT: 13.3 % (ref 11.5–14.5)
GLUCOSE SERPL-MCNC: 85 MG/DL (ref 74–99)
GLUCOSE SERPL-MCNC: 86 MG/DL (ref 74–99)
GLUCOSE UR STRIP.AUTO-MCNC: NORMAL MG/DL
HCT VFR BLD AUTO: 37.4 % (ref 41–52)
HCT VFR BLD AUTO: 44.6 % (ref 41–52)
HGB BLD-MCNC: 13.5 G/DL (ref 13.5–17.5)
HGB BLD-MCNC: 14.6 G/DL (ref 13.5–17.5)
IMM GRANULOCYTES # BLD AUTO: 0.01 X10*3/UL (ref 0–0.7)
IMM GRANULOCYTES NFR BLD AUTO: 0.1 % (ref 0–0.9)
INR PPP: 1 (ref 0.9–1.1)
KETONES UR STRIP.AUTO-MCNC: NEGATIVE MG/DL
LEFT ATRIUM VOLUME AREA LENGTH INDEX BSA: 31.9 ML/M2
LEFT VENTRICULAR OUTFLOW TRACT DIAMETER: 2.1 CM
LEUKOCYTE ESTERASE UR QL STRIP.AUTO: NEGATIVE
LYMPHOCYTES # BLD AUTO: 2.81 X10*3/UL (ref 1.2–4.8)
LYMPHOCYTES NFR BLD AUTO: 35.8 %
MAGNESIUM SERPL-MCNC: 2.22 MG/DL (ref 1.6–2.4)
MCH RBC QN AUTO: 30.7 PG (ref 26–34)
MCH RBC QN AUTO: 31.6 PG (ref 26–34)
MCHC RBC AUTO-ENTMCNC: 32.7 G/DL (ref 32–36)
MCHC RBC AUTO-ENTMCNC: 36.1 G/DL (ref 32–36)
MCV RBC AUTO: 88 FL (ref 80–100)
MCV RBC AUTO: 94 FL (ref 80–100)
MITRAL VALVE E/A RATIO: 0.56
MITRAL VALVE E/E' RATIO: 8.1
MONOCYTES # BLD AUTO: 0.59 X10*3/UL (ref 0.1–1)
MONOCYTES NFR BLD AUTO: 7.5 %
NEUTROPHILS # BLD AUTO: 4.11 X10*3/UL (ref 1.2–7.7)
NEUTROPHILS NFR BLD AUTO: 52.4 %
NITRITE UR QL STRIP.AUTO: NEGATIVE
NRBC BLD-RTO: 0 /100 WBCS (ref 0–0)
NRBC BLD-RTO: 0.3 /100 WBCS (ref 0–0)
PH UR STRIP.AUTO: 6.5 [PH]
PHOSPHATE SERPL-MCNC: 3.8 MG/DL (ref 2.5–4.9)
PLATELET # BLD AUTO: 154 X10*3/UL (ref 150–450)
PLATELET # BLD AUTO: 163 X10*3/UL (ref 150–450)
POTASSIUM SERPL-SCNC: 4.1 MMOL/L (ref 3.5–5.3)
POTASSIUM SERPL-SCNC: 4.2 MMOL/L (ref 3.5–5.3)
PROT SERPL-MCNC: 6.6 G/DL (ref 6.4–8.2)
PROT SERPL-MCNC: 6.6 G/DL (ref 6.4–8.2)
PROT UR STRIP.AUTO-MCNC: NEGATIVE MG/DL
PROTHROMBIN TIME: 11.2 SECONDS (ref 9.8–12.8)
RBC # BLD AUTO: 4.27 X10*6/UL (ref 4.5–5.9)
RBC # BLD AUTO: 4.76 X10*6/UL (ref 4.5–5.9)
RBC # UR STRIP.AUTO: NEGATIVE /UL
RH FACTOR (ANTIGEN D): NORMAL
RIGHT VENTRICLE FREE WALL PEAK S': 10.1 CM/S
SODIUM SERPL-SCNC: 139 MMOL/L (ref 136–145)
SODIUM SERPL-SCNC: 140 MMOL/L (ref 136–145)
SP GR UR STRIP.AUTO: 1.01
T4 FREE SERPL-MCNC: 1.03 NG/DL (ref 0.78–1.48)
T4 FREE SERPL-MCNC: 1.39 NG/DL (ref 0.78–1.48)
TRICUSPID ANNULAR PLANE SYSTOLIC EXCURSION: 2.8 CM
TSH SERPL-ACNC: 0.33 MIU/L (ref 0.44–3.98)
TSH SERPL-ACNC: 0.54 MIU/L (ref 0.44–3.98)
UFH PPP CHRO-ACNC: 0.2 IU/ML
UFH PPP CHRO-ACNC: 0.3 IU/ML
UFH PPP CHRO-ACNC: 0.4 IU/ML
UROBILINOGEN UR STRIP.AUTO-MCNC: NORMAL MG/DL
WBC # BLD AUTO: 7 X10*3/UL (ref 4.4–11.3)
WBC # BLD AUTO: 7.9 X10*3/UL (ref 4.4–11.3)

## 2024-06-27 PROCEDURE — 80053 COMPREHEN METABOLIC PANEL: CPT | Performed by: STUDENT IN AN ORGANIZED HEALTH CARE EDUCATION/TRAINING PROGRAM

## 2024-06-27 PROCEDURE — 2500000001 HC RX 250 WO HCPCS SELF ADMINISTERED DRUGS (ALT 637 FOR MEDICARE OP): Performed by: PHYSICIAN ASSISTANT

## 2024-06-27 PROCEDURE — 99291 CRITICAL CARE FIRST HOUR: CPT | Performed by: INTERNAL MEDICINE

## 2024-06-27 PROCEDURE — 80069 RENAL FUNCTION PANEL: CPT | Mod: CCI | Performed by: STUDENT IN AN ORGANIZED HEALTH CARE EDUCATION/TRAINING PROGRAM

## 2024-06-27 PROCEDURE — 71046 X-RAY EXAM CHEST 2 VIEWS: CPT

## 2024-06-27 PROCEDURE — 84460 ALANINE AMINO (ALT) (SGPT): CPT | Performed by: STUDENT IN AN ORGANIZED HEALTH CARE EDUCATION/TRAINING PROGRAM

## 2024-06-27 PROCEDURE — 80053 COMPREHEN METABOLIC PANEL: CPT

## 2024-06-27 PROCEDURE — 2500000001 HC RX 250 WO HCPCS SELF ADMINISTERED DRUGS (ALT 637 FOR MEDICARE OP)

## 2024-06-27 PROCEDURE — 2500000002 HC RX 250 W HCPCS SELF ADMINISTERED DRUGS (ALT 637 FOR MEDICARE OP, ALT 636 FOR OP/ED)

## 2024-06-27 PROCEDURE — 93306 TTE W/DOPPLER COMPLETE: CPT | Performed by: INTERNAL MEDICINE

## 2024-06-27 PROCEDURE — 36415 COLL VENOUS BLD VENIPUNCTURE: CPT

## 2024-06-27 PROCEDURE — 84450 TRANSFERASE (AST) (SGOT): CPT | Performed by: STUDENT IN AN ORGANIZED HEALTH CARE EDUCATION/TRAINING PROGRAM

## 2024-06-27 PROCEDURE — 84443 ASSAY THYROID STIM HORMONE: CPT | Performed by: PHYSICIAN ASSISTANT

## 2024-06-27 PROCEDURE — 84439 ASSAY OF FREE THYROXINE: CPT | Performed by: PHYSICIAN ASSISTANT

## 2024-06-27 PROCEDURE — 87081 CULTURE SCREEN ONLY: CPT | Performed by: STUDENT IN AN ORGANIZED HEALTH CARE EDUCATION/TRAINING PROGRAM

## 2024-06-27 PROCEDURE — 86901 BLOOD TYPING SEROLOGIC RH(D): CPT | Performed by: PHYSICIAN ASSISTANT

## 2024-06-27 PROCEDURE — 81003 URINALYSIS AUTO W/O SCOPE: CPT | Performed by: PHYSICIAN ASSISTANT

## 2024-06-27 PROCEDURE — 83735 ASSAY OF MAGNESIUM: CPT

## 2024-06-27 PROCEDURE — 85520 HEPARIN ASSAY: CPT

## 2024-06-27 PROCEDURE — 85025 COMPLETE CBC W/AUTO DIFF WBC: CPT

## 2024-06-27 PROCEDURE — 86850 RBC ANTIBODY SCREEN: CPT | Performed by: PHYSICIAN ASSISTANT

## 2024-06-27 PROCEDURE — 2020000001 HC ICU ROOM DAILY

## 2024-06-27 PROCEDURE — 2500000004 HC RX 250 GENERAL PHARMACY W/ HCPCS (ALT 636 FOR OP/ED)

## 2024-06-27 PROCEDURE — 82040 ASSAY OF SERUM ALBUMIN: CPT | Performed by: PHYSICIAN ASSISTANT

## 2024-06-27 PROCEDURE — 93306 TTE W/DOPPLER COMPLETE: CPT

## 2024-06-27 PROCEDURE — 93005 ELECTROCARDIOGRAM TRACING: CPT

## 2024-06-27 PROCEDURE — 85610 PROTHROMBIN TIME: CPT | Performed by: STUDENT IN AN ORGANIZED HEALTH CARE EDUCATION/TRAINING PROGRAM

## 2024-06-27 RX ORDER — METOPROLOL TARTRATE 25 MG/1
25 TABLET, FILM COATED ORAL 2 TIMES DAILY
Status: DISCONTINUED | OUTPATIENT
Start: 2024-06-27 | End: 2024-06-28

## 2024-06-27 RX ORDER — METOPROLOL TARTRATE 25 MG/1
TABLET, FILM COATED ORAL
Status: COMPLETED
Start: 2024-06-27 | End: 2024-06-27

## 2024-06-27 RX ORDER — MUPIROCIN 20 MG/G
OINTMENT TOPICAL 2 TIMES DAILY
Status: DISCONTINUED | OUTPATIENT
Start: 2024-06-27 | End: 2024-06-28

## 2024-06-27 RX ORDER — METOPROLOL SUCCINATE 25 MG/1
TABLET, EXTENDED RELEASE ORAL
Status: DISPENSED
Start: 2024-06-27 | End: 2024-06-27

## 2024-06-27 RX ORDER — CHLORHEXIDINE GLUCONATE ORAL RINSE 1.2 MG/ML
15 SOLUTION DENTAL 2 TIMES DAILY
Status: COMPLETED | OUTPATIENT
Start: 2024-06-27 | End: 2024-06-27

## 2024-06-27 SDOH — SOCIAL STABILITY: SOCIAL NETWORK: ARE YOU MARRIED, WIDOWED, DIVORCED, SEPARATED, NEVER MARRIED, OR LIVING WITH A PARTNER?: NEVER MARRIED

## 2024-06-27 SDOH — HEALTH STABILITY: MENTAL HEALTH: HOW OFTEN DO YOU HAVE 6 OR MORE DRINKS ON ONE OCCASION?: NEVER

## 2024-06-27 SDOH — SOCIAL STABILITY: SOCIAL INSECURITY: WITHIN THE LAST YEAR, HAVE YOU BEEN AFRAID OF YOUR PARTNER OR EX-PARTNER?: NO

## 2024-06-27 SDOH — HEALTH STABILITY: PHYSICAL HEALTH: ON AVERAGE, HOW MANY DAYS PER WEEK DO YOU ENGAGE IN MODERATE TO STRENUOUS EXERCISE (LIKE A BRISK WALK)?: 5 DAYS

## 2024-06-27 SDOH — SOCIAL STABILITY: SOCIAL INSECURITY
WITHIN THE LAST YEAR, HAVE TO BEEN RAPED OR FORCED TO HAVE ANY KIND OF SEXUAL ACTIVITY BY YOUR PARTNER OR EX-PARTNER?: NO

## 2024-06-27 SDOH — ECONOMIC STABILITY: INCOME INSECURITY: HOW HARD IS IT FOR YOU TO PAY FOR THE VERY BASICS LIKE FOOD, HOUSING, MEDICAL CARE, AND HEATING?: NOT HARD AT ALL

## 2024-06-27 SDOH — ECONOMIC STABILITY: FOOD INSECURITY: WITHIN THE PAST 12 MONTHS, THE FOOD YOU BOUGHT JUST DIDN'T LAST AND YOU DIDN'T HAVE MONEY TO GET MORE.: NEVER TRUE

## 2024-06-27 SDOH — ECONOMIC STABILITY: HOUSING INSECURITY
IN THE LAST 12 MONTHS, WAS THERE A TIME WHEN YOU DID NOT HAVE A STEADY PLACE TO SLEEP OR SLEPT IN A SHELTER (INCLUDING NOW)?: NO

## 2024-06-27 SDOH — SOCIAL STABILITY: SOCIAL NETWORK
DO YOU BELONG TO ANY CLUBS OR ORGANIZATIONS SUCH AS CHURCH GROUPS UNIONS, FRATERNAL OR ATHLETIC GROUPS, OR SCHOOL GROUPS?: NO

## 2024-06-27 SDOH — SOCIAL STABILITY: SOCIAL INSECURITY
WITHIN THE LAST YEAR, HAVE YOU BEEN KICKED, HIT, SLAPPED, OR OTHERWISE PHYSICALLY HURT BY YOUR PARTNER OR EX-PARTNER?: NO

## 2024-06-27 SDOH — HEALTH STABILITY: MENTAL HEALTH: HOW OFTEN DO YOU HAVE A DRINK CONTAINING ALCOHOL?: NEVER

## 2024-06-27 SDOH — ECONOMIC STABILITY: INCOME INSECURITY: IN THE PAST 12 MONTHS, HAS THE ELECTRIC, GAS, OIL, OR WATER COMPANY THREATENED TO SHUT OFF SERVICE IN YOUR HOME?: NO

## 2024-06-27 SDOH — ECONOMIC STABILITY: HOUSING INSECURITY: IN THE LAST 12 MONTHS, HOW MANY PLACES HAVE YOU LIVED?: 1

## 2024-06-27 SDOH — SOCIAL STABILITY: SOCIAL INSECURITY: WITHIN THE LAST YEAR, HAVE YOU BEEN HUMILIATED OR EMOTIONALLY ABUSED IN OTHER WAYS BY YOUR PARTNER OR EX-PARTNER?: NO

## 2024-06-27 SDOH — ECONOMIC STABILITY: INCOME INSECURITY: IN THE LAST 12 MONTHS, WAS THERE A TIME WHEN YOU WERE NOT ABLE TO PAY THE MORTGAGE OR RENT ON TIME?: NO

## 2024-06-27 SDOH — HEALTH STABILITY: MENTAL HEALTH
HOW OFTEN DO YOU NEED TO HAVE SOMEONE HELP YOU WHEN YOU READ INSTRUCTIONS, PAMPHLETS, OR OTHER WRITTEN MATERIAL FROM YOUR DOCTOR OR PHARMACY?: NEVER

## 2024-06-27 SDOH — HEALTH STABILITY: MENTAL HEALTH
STRESS IS WHEN SOMEONE FEELS TENSE, NERVOUS, ANXIOUS, OR CAN'T SLEEP AT NIGHT BECAUSE THEIR MIND IS TROUBLED. HOW STRESSED ARE YOU?: TO SOME EXTENT

## 2024-06-27 SDOH — ECONOMIC STABILITY: FOOD INSECURITY: WITHIN THE PAST 12 MONTHS, YOU WORRIED THAT YOUR FOOD WOULD RUN OUT BEFORE YOU GOT MONEY TO BUY MORE.: NEVER TRUE

## 2024-06-27 SDOH — SOCIAL STABILITY: SOCIAL NETWORK: IN A TYPICAL WEEK, HOW MANY TIMES DO YOU TALK ON THE PHONE WITH FAMILY, FRIENDS, OR NEIGHBORS?: THREE TIMES A WEEK

## 2024-06-27 SDOH — SOCIAL STABILITY: SOCIAL NETWORK: HOW OFTEN DO YOU ATTENT MEETINGS OF THE CLUB OR ORGANIZATION YOU BELONG TO?: NEVER

## 2024-06-27 SDOH — ECONOMIC STABILITY: TRANSPORTATION INSECURITY
IN THE PAST 12 MONTHS, HAS THE LACK OF TRANSPORTATION KEPT YOU FROM MEDICAL APPOINTMENTS OR FROM GETTING MEDICATIONS?: NO

## 2024-06-27 SDOH — ECONOMIC STABILITY: TRANSPORTATION INSECURITY
IN THE PAST 12 MONTHS, HAS LACK OF TRANSPORTATION KEPT YOU FROM MEETINGS, WORK, OR FROM GETTING THINGS NEEDED FOR DAILY LIVING?: NO

## 2024-06-27 SDOH — HEALTH STABILITY: PHYSICAL HEALTH: ON AVERAGE, HOW MANY MINUTES DO YOU ENGAGE IN EXERCISE AT THIS LEVEL?: 150+ MIN

## 2024-06-27 SDOH — SOCIAL STABILITY: SOCIAL NETWORK: HOW OFTEN DO YOU ATTEND CHURCH OR RELIGIOUS SERVICES?: MORE THAN 4 TIMES PER YEAR

## 2024-06-27 SDOH — HEALTH STABILITY: MENTAL HEALTH: HOW MANY STANDARD DRINKS CONTAINING ALCOHOL DO YOU HAVE ON A TYPICAL DAY?: PATIENT DOES NOT DRINK

## 2024-06-27 SDOH — SOCIAL STABILITY: SOCIAL NETWORK: HOW OFTEN DO YOU GET TOGETHER WITH FRIENDS OR RELATIVES?: THREE TIMES A WEEK

## 2024-06-27 ASSESSMENT — PAIN - FUNCTIONAL ASSESSMENT
PAIN_FUNCTIONAL_ASSESSMENT: 0-10

## 2024-06-27 ASSESSMENT — PAIN SCALES - GENERAL
PAINLEVEL_OUTOF10: 0 - NO PAIN

## 2024-06-27 ASSESSMENT — ENCOUNTER SYMPTOMS
MUSCULOSKELETAL NEGATIVE: 1
HEMATOLOGIC/LYMPHATIC NEGATIVE: 1
PSYCHIATRIC NEGATIVE: 1
ENDOCRINE NEGATIVE: 1
NEUROLOGICAL NEGATIVE: 1
RESPIRATORY NEGATIVE: 1
CARDIOVASCULAR NEGATIVE: 1
GASTROINTESTINAL NEGATIVE: 1
CONSTITUTIONAL NEGATIVE: 1
EYES NEGATIVE: 1

## 2024-06-27 ASSESSMENT — COGNITIVE AND FUNCTIONAL STATUS - GENERAL
STANDING UP FROM CHAIR USING ARMS: A LITTLE
CLIMB 3 TO 5 STEPS WITH RAILING: A LITTLE
MOBILITY SCORE: 20
WALKING IN HOSPITAL ROOM: A LITTLE
DAILY ACTIVITIY SCORE: 23
TOILETING: A LITTLE
MOVING TO AND FROM BED TO CHAIR: A LITTLE

## 2024-06-27 ASSESSMENT — LIFESTYLE VARIABLES
AUDIT-C TOTAL SCORE: 0
SKIP TO QUESTIONS 9-10: 1

## 2024-06-27 NOTE — CONSULTS
Reason for Consult: CAD  CARDIAC SURGERY CONSULT NOTE    HISTORY OF PRESENT ILLNESS  Mr. Felice Almonte is a 70 yo M with PMHx of DMT2, HTN, who presents as a transfer from [facility] following C which demonstrated critical LM disease.      Pt states he has noticed chest pain, on and off, for approximately 10 months worse the last 4-6 months. He describes the pain as substernal, burning pain, usually exacerbated with exertion, and improves with 10-15 minutes rest. Also endorses some associated SOB. Denies any radiation of the pain, diaphoresis, nausea/vomiting, or any other symptoms. He states the symptoms have worsened recently, and were most notable last week Tuesday while he was working. He is very physically active, and has been somewhat limited by the symptoms recently, so his sister arranged for him to have a LHC. Denies any recent bilateral lower extremity edema. LHC demonstrated critical ostial LM disease.    Cardiac surgery consulted for CABG evaluation.       Subjective   Past Medical History:   Diagnosis Date    Hyperlipidemia      Past Surgical History:   Procedure Laterality Date    CARDIAC CATHETERIZATION N/A 6/26/2024    Procedure: Left Heart Cath with Coronary Angiography and LV;  Surgeon: Dagoberto Washburn MD;  Location: [facility] Cardiac [facility];  Service: Cardiovascular;  Laterality: N/A;  6/26/24--12:30 pm for [facility] [address]--angina I20.9 and CMO I42.9  [facility];  (self [facility]); no PA required     Social History     Tobacco Use    Smoking status: Former     Types: Cigarettes   Substance Use Topics    Alcohol use: Never     No family history on file.    Patient has no known allergies.    Prior to Admission medications    Medication Sig Start Date End Date Taking? Authorizing Provider   aspirin 81 mg EC tablet Take 1 tablet (81 mg) by mouth once daily.   Yes Historical Provider, MD   metoprolol succinate XL (Toprol-XL) 25 mg 24 hr tablet Take 1 tablet (25 mg) by mouth once daily. Do not crush or  "chew.   Yes Historical Provider, MD   nitroglycerin (Nitrostat) 0.4 mg SL tablet Place 1 tablet (0.4 mg) under the tongue every 5 minutes if needed for chest pain.    Historical Provider, MD   rosuvastatin (Crestor) 10 mg tablet Take 1 tablet (10 mg) by mouth once daily.    Historical Provider, MD       Review of Systems  Review of Systems   Constitutional: Negative.    HENT: Negative.     Eyes: Negative.    Respiratory: Negative.     Cardiovascular: Negative.    Gastrointestinal: Negative.    Endocrine: Negative.    Genitourinary: Negative.    Musculoskeletal: Negative.    Neurological: Negative.    Hematological: Negative.    Psychiatric/Behavioral: Negative.             Objective   /80   Pulse 72   Temp 36.6 °C (97.9 °F) (Temporal)   Resp 19   Ht 1.727 m (5' 8\")   Wt 67.3 kg (148 lb 5.9 oz)   SpO2 96%   BMI 22.56 kg/m²   0-10 (Numeric) Pain Score: 0 - No pain   Vitals:    06/27/24 0605   Weight: 67.3 kg (148 lb 5.9 oz)          Intake/Output Summary (Last 24 hours) at 6/27/2024 1637  Last data filed at 6/27/2024 1200  Gross per 24 hour   Intake 156.64 ml   Output 2125 ml   Net -1968.36 ml       Physical Exam  Physical Exam  Constitutional:       General: He is not in acute distress.     Appearance: He is not ill-appearing.   HENT:      Head: Normocephalic.      Mouth/Throat:      Mouth: Mucous membranes are moist.   Cardiovascular:      Rate and Rhythm: Normal rate and regular rhythm.      Heart sounds: No murmur heard.  Pulmonary:      Effort: Pulmonary effort is normal.      Breath sounds: Normal breath sounds.   Musculoskeletal:         General: Normal range of motion.      Cervical back: Neck supple.      Right lower leg: No edema.      Left lower leg: No edema.   Skin:     General: Skin is warm and dry.   Neurological:      General: No focal deficit present.      Mental Status: He is alert and oriented to person, place, and time.   Psychiatric:         Mood and Affect: Mood normal.         " Behavior: Behavior normal.         Medications  Scheduled medications  aspirin, 81 mg, oral, Daily  chlorhexidine, 15 mL, Mouth/Throat, BID  insulin lispro, 0-5 Units, subcutaneous, TID  metoprolol tartrate, 25 mg, oral, BID  mupirocin, , Topical, BID  rosuvastatin, 40 mg, oral, Nightly    Continuous medications  heparin, 0-4,000 Units/hr, Last Rate: 1,000 Units/hr (06/27/24 1430)    PRN medications  PRN medications: dextrose, dextrose, glucagon, glucagon    Labs  Results for orders placed or performed during the hospital encounter of 06/26/24 (from the past 24 hour(s))   Blood Gas Venous Full Panel   Result Value Ref Range    POCT pH, Venous 7.38 7.33 - 7.43 pH    POCT pCO2, Venous 41 41 - 51 mm Hg    POCT pO2, Venous 67 (H) 35 - 45 mm Hg    POCT SO2, Venous 95 (H) 45 - 75 %    POCT Oxy Hemoglobin, Venous 88.6 (H) 45.0 - 75.0 %    POCT Hematocrit Calculated, Venous 43.0 41.0 - 52.0 %    POCT Sodium, Venous 136 136 - 145 mmol/L    POCT Potassium, Venous 4.5 3.5 - 5.3 mmol/L    POCT Chloride, Venous 102 98 - 107 mmol/L    POCT Ionized Calicum, Venous 1.18 1.10 - 1.33 mmol/L    POCT Glucose, Venous 79 74 - 99 mg/dL    POCT Lactate, Venous 1.3 0.4 - 2.0 mmol/L    POCT Base Excess, Venous -0.9 -2.0 - 3.0 mmol/L    POCT HCO3 Calculated, Venous 24.3 22.0 - 26.0 mmol/L    POCT Hemoglobin, Venous 14.4 13.5 - 17.5 g/dL    POCT Anion Gap, Venous 14.0 10.0 - 25.0 mmol/L    Patient Temperature 37.0 degrees Celsius    FiO2 21 %   Troponin I, High Sensitivity   Result Value Ref Range    Troponin I, High Sensitivity (CMC) 55 (H) 0 - 53 ng/L   Hemoglobin A1C   Result Value Ref Range    Hemoglobin A1C 5.7 (H) see below %    Estimated Average Glucose 117 Not Established mg/dL   Lipid Panel   Result Value Ref Range    Cholesterol 132 0 - 199 mg/dL    HDL-Cholesterol 29.3 mg/dL    Cholesterol/HDL Ratio 4.5     LDL Calculated 69 <=99 mg/dL    VLDL 33 0 - 40 mg/dL    Triglycerides 167 (H) 0 - 149 mg/dL    Non HDL Cholesterol 103 0 - 149  mg/dL   CBC and Auto Differential   Result Value Ref Range    WBC 6.8 4.4 - 11.3 x10*3/uL    nRBC 0.0 0.0 - 0.0 /100 WBCs    RBC 4.73 4.50 - 5.90 x10*6/uL    Hemoglobin 14.6 13.5 - 17.5 g/dL    Hematocrit 43.9 41.0 - 52.0 %    MCV 93 80 - 100 fL    MCH 30.9 26.0 - 34.0 pg    MCHC 33.3 32.0 - 36.0 g/dL    RDW 13.2 11.5 - 14.5 %    Platelets 172 150 - 450 x10*3/uL    Neutrophils % 57.6 40.0 - 80.0 %    Immature Granulocytes %, Automated 0.3 0.0 - 0.9 %    Lymphocytes % 31.8 13.0 - 44.0 %    Monocytes % 6.9 2.0 - 10.0 %    Eosinophils % 2.5 0.0 - 6.0 %    Basophils % 0.9 0.0 - 2.0 %    Neutrophils Absolute 3.92 1.20 - 7.70 x10*3/uL    Immature Granulocytes Absolute, Automated 0.02 0.00 - 0.70 x10*3/uL    Lymphocytes Absolute 2.16 1.20 - 4.80 x10*3/uL    Monocytes Absolute 0.47 0.10 - 1.00 x10*3/uL    Eosinophils Absolute 0.17 0.00 - 0.70 x10*3/uL    Basophils Absolute 0.06 0.00 - 0.10 x10*3/uL   Comprehensive metabolic panel   Result Value Ref Range    Glucose 70 (L) 74 - 99 mg/dL    Sodium 137 136 - 145 mmol/L    Potassium 4.8 3.5 - 5.3 mmol/L    Chloride 103 98 - 107 mmol/L    Bicarbonate 24 21 - 32 mmol/L    Anion Gap 15 10 - 20 mmol/L    Urea Nitrogen 22 6 - 23 mg/dL    Creatinine 0.87 0.50 - 1.30 mg/dL    eGFR >90 >60 mL/min/1.73m*2    Calcium 9.2 8.6 - 10.6 mg/dL    Albumin 4.1 3.4 - 5.0 g/dL    Alkaline Phosphatase 66 33 - 136 U/L    Total Protein 7.2 6.4 - 8.2 g/dL    AST 16 9 - 39 U/L    Bilirubin, Total 0.6 0.0 - 1.2 mg/dL    ALT 11 10 - 52 U/L   Magnesium   Result Value Ref Range    Magnesium 2.48 (H) 1.60 - 2.40 mg/dL   Phosphorus   Result Value Ref Range    Phosphorus 3.5 2.5 - 4.9 mg/dL   Coagulation Screen   Result Value Ref Range    Protime 11.3 9.8 - 12.8 seconds    INR 1.0 0.9 - 1.1    aPTT 28 27 - 38 seconds   TSH   Result Value Ref Range    Thyroid Stimulating Hormone 0.54 0.44 - 3.98 mIU/L   T4, free   Result Value Ref Range    Thyroxine, Free 1.39 0.78 - 1.48 ng/dL   CBC   Result Value Ref Range     WBC 7.0 4.4 - 11.3 x10*3/uL    nRBC 0.3 (H) 0.0 - 0.0 /100 WBCs    RBC 4.76 4.50 - 5.90 x10*6/uL    Hemoglobin 14.6 13.5 - 17.5 g/dL    Hematocrit 44.6 41.0 - 52.0 %    MCV 94 80 - 100 fL    MCH 30.7 26.0 - 34.0 pg    MCHC 32.7 32.0 - 36.0 g/dL    RDW 13.3 11.5 - 14.5 %    Platelets 154 150 - 450 x10*3/uL   Type and screen   Result Value Ref Range    ABO TYPE O     Rh TYPE POS     ANTIBODY SCREEN NEG    POCT GLUCOSE   Result Value Ref Range    POCT Glucose 85 74 - 99 mg/dL   Electrocardiogram, 12-lead PRN ACS symptoms   Result Value Ref Range    Ventricular Rate 68 BPM    Atrial Rate 68 BPM    WY Interval 214 ms    QRS Duration 88 ms    QT Interval 430 ms    QTC Calculation(Bazett) 457 ms    P Axis 9 degrees    R Axis 22 degrees    T Axis 151 degrees    QRS Count 11 beats    Q Onset 222 ms    P Onset 115 ms    P Offset 173 ms    T Offset 437 ms    QTC Fredericia 448 ms   Heparin Assay, UFH   Result Value Ref Range    Heparin Unfractionated 0.2 See Comment Below for Therapeutic Ranges IU/mL   Magnesium   Result Value Ref Range    Magnesium 2.22 1.60 - 2.40 mg/dL   CBC and Auto Differential   Result Value Ref Range    WBC 7.9 4.4 - 11.3 x10*3/uL    nRBC 0.0 0.0 - 0.0 /100 WBCs    RBC 4.27 (L) 4.50 - 5.90 x10*6/uL    Hemoglobin 13.5 13.5 - 17.5 g/dL    Hematocrit 37.4 (L) 41.0 - 52.0 %    MCV 88 80 - 100 fL    MCH 31.6 26.0 - 34.0 pg    MCHC 36.1 (H) 32.0 - 36.0 g/dL    RDW 13.2 11.5 - 14.5 %    Platelets 163 150 - 450 x10*3/uL    Neutrophils % 52.4 40.0 - 80.0 %    Immature Granulocytes %, Automated 0.1 0.0 - 0.9 %    Lymphocytes % 35.8 13.0 - 44.0 %    Monocytes % 7.5 2.0 - 10.0 %    Eosinophils % 3.2 0.0 - 6.0 %    Basophils % 1.0 0.0 - 2.0 %    Neutrophils Absolute 4.11 1.20 - 7.70 x10*3/uL    Immature Granulocytes Absolute, Automated 0.01 0.00 - 0.70 x10*3/uL    Lymphocytes Absolute 2.81 1.20 - 4.80 x10*3/uL    Monocytes Absolute 0.59 0.10 - 1.00 x10*3/uL    Eosinophils Absolute 0.25 0.00 - 0.70 x10*3/uL     Basophils Absolute 0.08 0.00 - 0.10 x10*3/uL   Comprehensive Metabolic Panel   Result Value Ref Range    Glucose 85 74 - 99 mg/dL    Sodium 139 136 - 145 mmol/L    Potassium 4.1 3.5 - 5.3 mmol/L    Chloride 104 98 - 107 mmol/L    Bicarbonate 25 21 - 32 mmol/L    Anion Gap 14 10 - 20 mmol/L    Urea Nitrogen 27 (H) 6 - 23 mg/dL    Creatinine 0.98 0.50 - 1.30 mg/dL    eGFR 83 >60 mL/min/1.73m*2    Calcium 9.1 8.6 - 10.6 mg/dL    Albumin 3.9 3.4 - 5.0 g/dL    Alkaline Phosphatase 66 33 - 136 U/L    Total Protein 6.6 6.4 - 8.2 g/dL    AST 15 9 - 39 U/L    Bilirubin, Total 0.4 0.0 - 1.2 mg/dL    ALT 12 10 - 52 U/L   Heparin Assay, UFH   Result Value Ref Range    Heparin Unfractionated 0.2 See Comment Below for Therapeutic Ranges IU/mL   AST   Result Value Ref Range    AST 14 9 - 39 U/L   ALT   Result Value Ref Range    ALT 10 10 - 52 U/L   Renal function panel   Result Value Ref Range    Glucose 86 74 - 99 mg/dL    Sodium 140 136 - 145 mmol/L    Potassium 4.2 3.5 - 5.3 mmol/L    Chloride 104 98 - 107 mmol/L    Bicarbonate 21 21 - 32 mmol/L    Anion Gap 19 10 - 20 mmol/L    Urea Nitrogen 27 (H) 6 - 23 mg/dL    Creatinine 1.02 0.50 - 1.30 mg/dL    eGFR 79 >60 mL/min/1.73m*2    Calcium 8.9 8.6 - 10.6 mg/dL    Phosphorus 3.8 2.5 - 4.9 mg/dL    Albumin 3.9 3.4 - 5.0 g/dL   Coagulation Screen   Result Value Ref Range    Protime 11.2 9.8 - 12.8 seconds    INR 1.0 0.9 - 1.1    aPTT 38 27 - 38 seconds   Heparin Assay, UFH   Result Value Ref Range    Heparin Unfractionated 0.3 See Comment Below for Therapeutic Ranges IU/mL   Heparin Assay, UFH   Result Value Ref Range    Heparin Unfractionated 0.4 See Comment Below for Therapeutic Ranges IU/mL   Transthoracic Echo (TTE) Complete   Result Value Ref Range    AV pk joel 0.98 m/s    LVOT diam 2.10 cm    MV avg E/e' ratio 8.10     MV E/A ratio 0.56     LA vol index A/L 31.9 ml/m2    Tricuspid annular plane systolic excursion 2.8 cm    LV EF 40 %    RV free wall pk S' 10.10 cm/s    Aortic  Valve Area by Continuity of Peak Velocity 3.13 cm2    AV pk grad 3.9 mmHg    LV A4C EF 54.2    Hepatic Function Panel   Result Value Ref Range    Albumin 3.8 3.4 - 5.0 g/dL    Bilirubin, Total 0.6 0.0 - 1.2 mg/dL    Bilirubin, Direct 0.1 0.0 - 0.3 mg/dL    Alkaline Phosphatase 66 33 - 136 U/L    ALT 9 (L) 10 - 52 U/L    AST 14 9 - 39 U/L    Total Protein 6.6 6.4 - 8.2 g/dL   TSH with reflex to Free T4 if abnormal   Result Value Ref Range    Thyroid Stimulating Hormone 0.33 (L) 0.44 - 3.98 mIU/L   Thyroxine, Free   Result Value Ref Range    Thyroxine, Free 1.03 0.78 - 1.48 ng/dL   Urinalysis with Reflex Culture and Microscopic   Result Value Ref Range    Color, Urine Light-Yellow Light-Yellow, Yellow, Dark-Yellow    Appearance, Urine Clear Clear    Specific Gravity, Urine 1.009 1.005 - 1.035    pH, Urine 6.5 5.0, 5.5, 6.0, 6.5, 7.0, 7.5, 8.0    Protein, Urine NEGATIVE NEGATIVE, 10 (TRACE), 20 (TRACE) mg/dL    Glucose, Urine Normal Normal mg/dL    Blood, Urine NEGATIVE NEGATIVE    Ketones, Urine NEGATIVE NEGATIVE mg/dL    Bilirubin, Urine NEGATIVE NEGATIVE    Urobilinogen, Urine Normal Normal mg/dL    Nitrite, Urine NEGATIVE NEGATIVE    Leukocyte Esterase, Urine NEGATIVE NEGATIVE               ASSESSMENT & PLAN  Mr. Felice Almonte is a 68 yo M with PMHx of DMT2, HTN, who presents as a transfer from [facility] following C which demonstrated critical LM disease.      Pt states he has noticed chest pain, on and off, for approximately 10 months worse the last 4-6 months. He describes the pain as substernal, burning pain, usually exacerbated with exertion, and improves with 10-15 minutes rest. Also endorses some associated SOB. Denies any radiation of the pain, diaphoresis, nausea/vomiting, or any other symptoms. He states the symptoms have worsened recently, and were most notable last week Tuesday while he was working. He is very physically active, and has been somewhat limited by the symptoms recently, so his sister arranged  for him to have a LHC. Denies any recent bilateral lower extremity edema. LHC demonstrated critical ostial LM disease.    Cardiac surgery consulted for CABG evaluation.       RECOMMENDATIONS/PLAN  Dr. Paul Shoemaker met with patient and family and reviewed imaging and data.    - Plan for OR 6/28 Friday for MIDCAB  - TTE obtained EF 40%  - LHC in EMR  - Medical optimization per primary team  - Preop risk stratification studies/labs ordered in EMR by our team  --- US Carotids  --- PFTs (spirometry and room air ABG)  --- 2 view CXR  --- MRSA, UA/Culture, LFTs, HgbA1c, TSH/T4, Lipid panel  --- Please obtain a CT chest non con  - Dental evaluation not indicated for isolated CAB surgeries   - Continue ASA, high-intensity statin, and BB (or document contraindications for use)  - No ACEi/ARBs in the pre-op period (at least 48 hours)  - Hold any SGLT2 inhibitors (Farxiga, Jardiance, etc.) for at least 3 days prior to cardiac surgery to prevent euglycemic DKA  - No antiplatelets other than ASA, no anticoagulants other than Heparin  - NPO after midnight, blood on hold/T&S, and preop scrubs ordered for OR 6/28      Thank you for the consultation.   Patient educated and all questions answered.  Please page the cardiac surgery consult pager [phone] with any questions or changes in patient condition.    Ella Padilla PA-C  Cardiac Surgery Consult ANTOINETTE  [facility]  Cardiac Surgery Consult Pager [phone]     6/27/2024  4:37 PM

## 2024-06-27 NOTE — CONSULTS
"Reason for Consult: CABG Eval for critical LM disease  CARDIAC SURGERY CONSULT NOTE    HISTORY OF PRESENT ILLNESS  Felice Almonte is a 69 y.o. male, with a PMH of HLD, angina, dyspnea. Patient is luisa and very active on day to day basis and noted chest pain on and off over the past 10 months to year which slowly had been limiting activity. He presented to [facility] (formerly [facility]) from OSH after critical LM disease found after abnormal stress test and cardiac cath.    Patient is resting comfortably in bed, off pressors, without cardiac augmentation, hemodynamically stable, and on heparin drip.      Cardiac surgery is consulted for CABG evaluation.     Subjective   Past Medical History:   Diagnosis Date    Hyperlipidemia      No past surgical history on file.  Social History     Tobacco Use    Smoking status: Former     Types: Cigarettes   Substance Use Topics    Alcohol use: Never     No family history on file.    Patient has no known allergies.    Prior to Admission medications    Medication Sig Start Date End Date Taking? Authorizing Provider   aspirin 81 mg EC tablet Take 1 tablet (81 mg) by mouth once daily.   Yes Historical Provider, MD   metoprolol succinate XL (Toprol-XL) 25 mg 24 hr tablet Take 1 tablet (25 mg) by mouth once daily. Do not crush or chew.   Yes Historical Provider, MD   nitroglycerin (Nitrostat) 0.4 mg SL tablet Place 1 tablet (0.4 mg) under the tongue every 5 minutes if needed for chest pain.    Historical Provider, MD   rosuvastatin (Crestor) 10 mg tablet Take 1 tablet (10 mg) by mouth once daily.    Historical Provider, MD       Review of Systems  Review of Systems        Objective   /88   Pulse 64   Temp 36 °C (96.8 °F)   Resp 18   Ht 1.727 m (5' 8\")   Wt 64.7 kg (142 lb 9.6 oz)   SpO2 97%   BMI 21.68 kg/m²   0-10 (Numeric) Pain Score: 0 - No pain   Vitals:    06/26/24 1629   Weight: 64.7 kg (142 lb 9.6 oz)          Intake/Output Summary (Last 24 hours) at 6/27/2024 0541  Last " data filed at 6/27/2024 0300  Gross per 24 hour   Intake 86.64 ml   Output 875 ml   Net -788.36 ml       Physical Exam  General: well-developed, no acute distress, AAOx3  HEENT: EOM intact, PERRL  CV: regular rate and rhythm, normal S1/S2, no murmur  Pulm: normal respiratory effort  Abd: soft, nontender, nondistended  Extremities: warm well perfused   Neuro: moves all extremities spontaneously   Psych: normal mood and affect  Skin: warm, dry     Medications  Scheduled medications  aspirin, 81 mg, oral, Daily  insulin lispro, 0-5 Units, subcutaneous, TID  [Held by provider] metoprolol succinate XL, 25 mg, oral, Daily  rosuvastatin, 40 mg, oral, Nightly    Continuous medications  heparin, 0-4,000 Units/hr, Last Rate: 1,000 Units/hr (06/27/24 0218)    PRN medications  PRN medications: dextrose, dextrose, glucagon, glucagon    Labs  Results for orders placed or performed during the hospital encounter of 06/26/24 (from the past 24 hour(s))   Blood Gas Venous Full Panel   Result Value Ref Range    POCT pH, Venous 7.38 7.33 - 7.43 pH    POCT pCO2, Venous 41 41 - 51 mm Hg    POCT pO2, Venous 67 (H) 35 - 45 mm Hg    POCT SO2, Venous 95 (H) 45 - 75 %    POCT Oxy Hemoglobin, Venous 88.6 (H) 45.0 - 75.0 %    POCT Hematocrit Calculated, Venous 43.0 41.0 - 52.0 %    POCT Sodium, Venous 136 136 - 145 mmol/L    POCT Potassium, Venous 4.5 3.5 - 5.3 mmol/L    POCT Chloride, Venous 102 98 - 107 mmol/L    POCT Ionized Calicum, Venous 1.18 1.10 - 1.33 mmol/L    POCT Glucose, Venous 79 74 - 99 mg/dL    POCT Lactate, Venous 1.3 0.4 - 2.0 mmol/L    POCT Base Excess, Venous -0.9 -2.0 - 3.0 mmol/L    POCT HCO3 Calculated, Venous 24.3 22.0 - 26.0 mmol/L    POCT Hemoglobin, Venous 14.4 13.5 - 17.5 g/dL    POCT Anion Gap, Venous 14.0 10.0 - 25.0 mmol/L    Patient Temperature 37.0 degrees Celsius    FiO2 21 %   Troponin I, High Sensitivity   Result Value Ref Range    Troponin I, High Sensitivity (CMC) 55 (H) 0 - 53 ng/L   Hemoglobin A1C   Result  Value Ref Range    Hemoglobin A1C 5.7 (H) see below %    Estimated Average Glucose 117 Not Established mg/dL   Lipid Panel   Result Value Ref Range    Cholesterol 132 0 - 199 mg/dL    HDL-Cholesterol 29.3 mg/dL    Cholesterol/HDL Ratio 4.5     LDL Calculated 69 <=99 mg/dL    VLDL 33 0 - 40 mg/dL    Triglycerides 167 (H) 0 - 149 mg/dL    Non HDL Cholesterol 103 0 - 149 mg/dL   CBC and Auto Differential   Result Value Ref Range    WBC 6.8 4.4 - 11.3 x10*3/uL    nRBC 0.0 0.0 - 0.0 /100 WBCs    RBC 4.73 4.50 - 5.90 x10*6/uL    Hemoglobin 14.6 13.5 - 17.5 g/dL    Hematocrit 43.9 41.0 - 52.0 %    MCV 93 80 - 100 fL    MCH 30.9 26.0 - 34.0 pg    MCHC 33.3 32.0 - 36.0 g/dL    RDW 13.2 11.5 - 14.5 %    Platelets 172 150 - 450 x10*3/uL    Neutrophils % 57.6 40.0 - 80.0 %    Immature Granulocytes %, Automated 0.3 0.0 - 0.9 %    Lymphocytes % 31.8 13.0 - 44.0 %    Monocytes % 6.9 2.0 - 10.0 %    Eosinophils % 2.5 0.0 - 6.0 %    Basophils % 0.9 0.0 - 2.0 %    Neutrophils Absolute 3.92 1.20 - 7.70 x10*3/uL    Immature Granulocytes Absolute, Automated 0.02 0.00 - 0.70 x10*3/uL    Lymphocytes Absolute 2.16 1.20 - 4.80 x10*3/uL    Monocytes Absolute 0.47 0.10 - 1.00 x10*3/uL    Eosinophils Absolute 0.17 0.00 - 0.70 x10*3/uL    Basophils Absolute 0.06 0.00 - 0.10 x10*3/uL   Comprehensive metabolic panel   Result Value Ref Range    Glucose 70 (L) 74 - 99 mg/dL    Sodium 137 136 - 145 mmol/L    Potassium 4.8 3.5 - 5.3 mmol/L    Chloride 103 98 - 107 mmol/L    Bicarbonate 24 21 - 32 mmol/L    Anion Gap 15 10 - 20 mmol/L    Urea Nitrogen 22 6 - 23 mg/dL    Creatinine 0.87 0.50 - 1.30 mg/dL    eGFR >90 >60 mL/min/1.73m*2    Calcium 9.2 8.6 - 10.6 mg/dL    Albumin 4.1 3.4 - 5.0 g/dL    Alkaline Phosphatase 66 33 - 136 U/L    Total Protein 7.2 6.4 - 8.2 g/dL    AST 16 9 - 39 U/L    Bilirubin, Total 0.6 0.0 - 1.2 mg/dL    ALT 11 10 - 52 U/L   Magnesium   Result Value Ref Range    Magnesium 2.48 (H) 1.60 - 2.40 mg/dL   Phosphorus   Result  Value Ref Range    Phosphorus 3.5 2.5 - 4.9 mg/dL   Coagulation Screen   Result Value Ref Range    Protime 11.3 9.8 - 12.8 seconds    INR 1.0 0.9 - 1.1    aPTT 28 27 - 38 seconds   Type and screen   Result Value Ref Range    ABO TYPE O     Rh TYPE POS     ANTIBODY SCREEN NEG    POCT GLUCOSE   Result Value Ref Range    POCT Glucose 85 74 - 99 mg/dL   Electrocardiogram, 12-lead PRN ACS symptoms   Result Value Ref Range    Ventricular Rate 68 BPM    Atrial Rate 68 BPM    UT Interval 214 ms    QRS Duration 88 ms    QT Interval 430 ms    QTC Calculation(Bazett) 457 ms    P Axis 9 degrees    R Axis 22 degrees    T Axis 151 degrees    QRS Count 11 beats    Q Onset 222 ms    P Onset 115 ms    P Offset 173 ms    T Offset 437 ms    QTC Fredericia 448 ms   Heparin Assay, UFH   Result Value Ref Range    Heparin Unfractionated 0.2 See Comment Below for Therapeutic Ranges IU/mL   Magnesium   Result Value Ref Range    Magnesium 2.22 1.60 - 2.40 mg/dL   CBC and Auto Differential   Result Value Ref Range    WBC 7.9 4.4 - 11.3 x10*3/uL    nRBC 0.0 0.0 - 0.0 /100 WBCs    RBC 4.27 (L) 4.50 - 5.90 x10*6/uL    Hemoglobin 13.5 13.5 - 17.5 g/dL    Hematocrit 37.4 (L) 41.0 - 52.0 %    MCV 88 80 - 100 fL    MCH 31.6 26.0 - 34.0 pg    MCHC 36.1 (H) 32.0 - 36.0 g/dL    RDW 13.2 11.5 - 14.5 %    Platelets 163 150 - 450 x10*3/uL    Neutrophils % 52.4 40.0 - 80.0 %    Immature Granulocytes %, Automated 0.1 0.0 - 0.9 %    Lymphocytes % 35.8 13.0 - 44.0 %    Monocytes % 7.5 2.0 - 10.0 %    Eosinophils % 3.2 0.0 - 6.0 %    Basophils % 1.0 0.0 - 2.0 %    Neutrophils Absolute 4.11 1.20 - 7.70 x10*3/uL    Immature Granulocytes Absolute, Automated 0.01 0.00 - 0.70 x10*3/uL    Lymphocytes Absolute 2.81 1.20 - 4.80 x10*3/uL    Monocytes Absolute 0.59 0.10 - 1.00 x10*3/uL    Eosinophils Absolute 0.25 0.00 - 0.70 x10*3/uL    Basophils Absolute 0.08 0.00 - 0.10 x10*3/uL   Comprehensive Metabolic Panel   Result Value Ref Range    Glucose 85 74 - 99 mg/dL     Sodium 139 136 - 145 mmol/L    Potassium 4.1 3.5 - 5.3 mmol/L    Chloride 104 98 - 107 mmol/L    Bicarbonate 25 21 - 32 mmol/L    Anion Gap 14 10 - 20 mmol/L    Urea Nitrogen 27 (H) 6 - 23 mg/dL    Creatinine 0.98 0.50 - 1.30 mg/dL    eGFR 83 >60 mL/min/1.73m*2    Calcium 9.1 8.6 - 10.6 mg/dL    Albumin 3.9 3.4 - 5.0 g/dL    Alkaline Phosphatase 66 33 - 136 U/L    Total Protein 6.6 6.4 - 8.2 g/dL    AST 15 9 - 39 U/L    Bilirubin, Total 0.4 0.0 - 1.2 mg/dL    ALT 12 10 - 52 U/L   Heparin Assay, UFH   Result Value Ref Range    Heparin Unfractionated 0.2 See Comment Below for Therapeutic Ranges IU/mL       Pertinent Imaging  LHC: Pending final read  Echo: Pending          ASSESSMENT & PLAN  Felice Almonte is a 69 y.o. male, with a PMH of HLD, angina, dyspnea. Patient is Bahai and very active on day to day basis and noted chest pain on and off over the past 10 months to year which slowly had been limiting activity. He presented to [facility] from OSH after critical LM disease found after abnormal stress test and cardiac cath.    Patient is resting comfortably in bed, off pressors, without cardiac augmentation, hemodynamically stable, and on heparin drip.       RECOMMENDATIONS/PLAN  Dr. Gonzalez aware of patient, currently reviewing case and available imaging. Emergent cardiac surgery not indicated at this time.   No OR date established, will need further preoperative testing.     - Please obtain and upload OSH imaging to [employer]/PACS if not already done  - Medical optimization per primary team    When/if goes to surgery:  - Continue ASA, high-intensity statin, and BB- No ACEi/ARBs in the pre-op period (at least 48 hours)  - Hold any SGLT2 inhibitors (Farxiga, Jardiance, etc.) for at least 3 days prior to cardiac surgery to prevent euglycemic DKA  - No antiplatelets other than ASA, no anticoagulants other than Heparin  - NPO after midnight, blood on hold/T&S, and preop scrubs only to be ordered once OR date is  established    Will continue to follow along.  Thank you for the consultation.   Patient educated and all questions answered.  Please page the cardiac surgery consult pager [phone] with any questions or changes in patient condition.      Cardiac Surgery Consult   [facility]  Cardiac Surgery Consult Pager [phone]     6/27/2024  5:41 AM

## 2024-06-27 NOTE — H&P (VIEW-ONLY)
Reason for Consult: CAD  CARDIAC SURGERY CONSULT NOTE    HISTORY OF PRESENT ILLNESS  Mr. Felice Almonte is a 68 yo M with PMHx of DMT2, HTN, who presents as a transfer from [facility] following C which demonstrated critical LM disease.      Pt states he has noticed chest pain, on and off, for approximately 10 months worse the last 4-6 months. He describes the pain as substernal, burning pain, usually exacerbated with exertion, and improves with 10-15 minutes rest. Also endorses some associated SOB. Denies any radiation of the pain, diaphoresis, nausea/vomiting, or any other symptoms. He states the symptoms have worsened recently, and were most notable last week Tuesday while he was working. He is very physically active, and has been somewhat limited by the symptoms recently, so his sister arranged for him to have a LHC. Denies any recent bilateral lower extremity edema. LHC demonstrated critical ostial LM disease.    Cardiac surgery consulted for CABG evaluation.       Subjective   Past Medical History:   Diagnosis Date    Hyperlipidemia      Past Surgical History:   Procedure Laterality Date    CARDIAC CATHETERIZATION N/A 6/26/2024    Procedure: Left Heart Cath with Coronary Angiography and LV;  Surgeon: Dagoberto Washburn MD;  Location: [facility] Cardiac Cath Lab;  Service: Cardiovascular;  Laterality: N/A;  6/26/24--12:30 pm for [facility] [id number]--angina I20.9 and CMO I42.9  [facility];  (self pay); no PA required     Social History     Tobacco Use    Smoking status: Former     Types: Cigarettes   Substance Use Topics    Alcohol use: Never     No family history on file.    Patient has no known allergies.    Prior to Admission medications    Medication Sig Start Date End Date Taking? Authorizing Provider   aspirin 81 mg EC tablet Take 1 tablet (81 mg) by mouth once daily.   Yes Historical Provider, MD   metoprolol succinate XL (Toprol-XL) 25 mg 24 hr tablet Take 1 tablet (25 mg) by mouth once daily. Do not crush or  "chew.   Yes Historical Provider, MD   nitroglycerin (Nitrostat) 0.4 mg SL tablet Place 1 tablet (0.4 mg) under the tongue every 5 minutes if needed for chest pain.    Historical Provider, MD   rosuvastatin (Crestor) 10 mg tablet Take 1 tablet (10 mg) by mouth once daily.    Historical Provider, MD       Review of Systems  Review of Systems   Constitutional: Negative.    HENT: Negative.     Eyes: Negative.    Respiratory: Negative.     Cardiovascular: Negative.    Gastrointestinal: Negative.    Endocrine: Negative.    Genitourinary: Negative.    Musculoskeletal: Negative.    Neurological: Negative.    Hematological: Negative.    Psychiatric/Behavioral: Negative.             Objective   /80   Pulse 72   Temp 36.6 °C (97.9 °F) (Temporal)   Resp 19   Ht 1.727 m (5' 8\")   Wt 67.3 kg (148 lb 5.9 oz)   SpO2 96%   BMI 22.56 kg/m²   0-10 (Numeric) Pain Score: 0 - No pain   Vitals:    06/27/24 0605   Weight: 67.3 kg (148 lb 5.9 oz)          Intake/Output Summary (Last 24 hours) at 6/27/2024 1637  Last data filed at 6/27/2024 1200  Gross per 24 hour   Intake 156.64 ml   Output 2125 ml   Net -1968.36 ml       Physical Exam  Physical Exam  Constitutional:       General: He is not in acute distress.     Appearance: He is not ill-appearing.   HENT:      Head: Normocephalic.      Mouth/Throat:      Mouth: Mucous membranes are moist.   Cardiovascular:      Rate and Rhythm: Normal rate and regular rhythm.      Heart sounds: No murmur heard.  Pulmonary:      Effort: Pulmonary effort is normal.      Breath sounds: Normal breath sounds.   Musculoskeletal:         General: Normal range of motion.      Cervical back: Neck supple.      Right lower leg: No edema.      Left lower leg: No edema.   Skin:     General: Skin is warm and dry.   Neurological:      General: No focal deficit present.      Mental Status: He is alert and oriented to person, place, and time.   Psychiatric:         Mood and Affect: Mood normal.         " Behavior: Behavior normal.         Medications  Scheduled medications  aspirin, 81 mg, oral, Daily  chlorhexidine, 15 mL, Mouth/Throat, BID  insulin lispro, 0-5 Units, subcutaneous, TID  metoprolol tartrate, 25 mg, oral, BID  mupirocin, , Topical, BID  rosuvastatin, 40 mg, oral, Nightly    Continuous medications  heparin, 0-4,000 Units/hr, Last Rate: 1,000 Units/hr (06/27/24 1430)    PRN medications  PRN medications: dextrose, dextrose, glucagon, glucagon    Labs  Results for orders placed or performed during the hospital encounter of 06/26/24 (from the past 24 hour(s))   Blood Gas Venous Full Panel   Result Value Ref Range    POCT pH, Venous 7.38 7.33 - 7.43 pH    POCT pCO2, Venous 41 41 - 51 mm Hg    POCT pO2, Venous 67 (H) 35 - 45 mm Hg    POCT SO2, Venous 95 (H) 45 - 75 %    POCT Oxy Hemoglobin, Venous 88.6 (H) 45.0 - 75.0 %    POCT Hematocrit Calculated, Venous 43.0 41.0 - 52.0 %    POCT Sodium, Venous 136 136 - 145 mmol/L    POCT Potassium, Venous 4.5 3.5 - 5.3 mmol/L    POCT Chloride, Venous 102 98 - 107 mmol/L    POCT Ionized Calicum, Venous 1.18 1.10 - 1.33 mmol/L    POCT Glucose, Venous 79 74 - 99 mg/dL    POCT Lactate, Venous 1.3 0.4 - 2.0 mmol/L    POCT Base Excess, Venous -0.9 -2.0 - 3.0 mmol/L    POCT HCO3 Calculated, Venous 24.3 22.0 - 26.0 mmol/L    POCT Hemoglobin, Venous 14.4 13.5 - 17.5 g/dL    POCT Anion Gap, Venous 14.0 10.0 - 25.0 mmol/L    Patient Temperature 37.0 degrees Celsius    FiO2 21 %   Troponin I, High Sensitivity   Result Value Ref Range    Troponin I, High Sensitivity (CMC) 55 (H) 0 - 53 ng/L   Hemoglobin A1C   Result Value Ref Range    Hemoglobin A1C 5.7 (H) see below %    Estimated Average Glucose 117 Not Established mg/dL   Lipid Panel   Result Value Ref Range    Cholesterol 132 0 - 199 mg/dL    HDL-Cholesterol 29.3 mg/dL    Cholesterol/HDL Ratio 4.5     LDL Calculated 69 <=99 mg/dL    VLDL 33 0 - 40 mg/dL    Triglycerides 167 (H) 0 - 149 mg/dL    Non HDL Cholesterol 103 0 - 149  mg/dL   CBC and Auto Differential   Result Value Ref Range    WBC 6.8 4.4 - 11.3 x10*3/uL    nRBC 0.0 0.0 - 0.0 /100 WBCs    RBC 4.73 4.50 - 5.90 x10*6/uL    Hemoglobin 14.6 13.5 - 17.5 g/dL    Hematocrit 43.9 41.0 - 52.0 %    MCV 93 80 - 100 fL    MCH 30.9 26.0 - 34.0 pg    MCHC 33.3 32.0 - 36.0 g/dL    RDW 13.2 11.5 - 14.5 %    Platelets 172 150 - 450 x10*3/uL    Neutrophils % 57.6 40.0 - 80.0 %    Immature Granulocytes %, Automated 0.3 0.0 - 0.9 %    Lymphocytes % 31.8 13.0 - 44.0 %    Monocytes % 6.9 2.0 - 10.0 %    Eosinophils % 2.5 0.0 - 6.0 %    Basophils % 0.9 0.0 - 2.0 %    Neutrophils Absolute 3.92 1.20 - 7.70 x10*3/uL    Immature Granulocytes Absolute, Automated 0.02 0.00 - 0.70 x10*3/uL    Lymphocytes Absolute 2.16 1.20 - 4.80 x10*3/uL    Monocytes Absolute 0.47 0.10 - 1.00 x10*3/uL    Eosinophils Absolute 0.17 0.00 - 0.70 x10*3/uL    Basophils Absolute 0.06 0.00 - 0.10 x10*3/uL   Comprehensive metabolic panel   Result Value Ref Range    Glucose 70 (L) 74 - 99 mg/dL    Sodium 137 136 - 145 mmol/L    Potassium 4.8 3.5 - 5.3 mmol/L    Chloride 103 98 - 107 mmol/L    Bicarbonate 24 21 - 32 mmol/L    Anion Gap 15 10 - 20 mmol/L    Urea Nitrogen 22 6 - 23 mg/dL    Creatinine 0.87 0.50 - 1.30 mg/dL    eGFR >90 >60 mL/min/1.73m*2    Calcium 9.2 8.6 - 10.6 mg/dL    Albumin 4.1 3.4 - 5.0 g/dL    Alkaline Phosphatase 66 33 - 136 U/L    Total Protein 7.2 6.4 - 8.2 g/dL    AST 16 9 - 39 U/L    Bilirubin, Total 0.6 0.0 - 1.2 mg/dL    ALT 11 10 - 52 U/L   Magnesium   Result Value Ref Range    Magnesium 2.48 (H) 1.60 - 2.40 mg/dL   Phosphorus   Result Value Ref Range    Phosphorus 3.5 2.5 - 4.9 mg/dL   Coagulation Screen   Result Value Ref Range    Protime 11.3 9.8 - 12.8 seconds    INR 1.0 0.9 - 1.1    aPTT 28 27 - 38 seconds   TSH   Result Value Ref Range    Thyroid Stimulating Hormone 0.54 0.44 - 3.98 mIU/L   T4, free   Result Value Ref Range    Thyroxine, Free 1.39 0.78 - 1.48 ng/dL   CBC   Result Value Ref Range     WBC 7.0 4.4 - 11.3 x10*3/uL    nRBC 0.3 (H) 0.0 - 0.0 /100 WBCs    RBC 4.76 4.50 - 5.90 x10*6/uL    Hemoglobin 14.6 13.5 - 17.5 g/dL    Hematocrit 44.6 41.0 - 52.0 %    MCV 94 80 - 100 fL    MCH 30.7 26.0 - 34.0 pg    MCHC 32.7 32.0 - 36.0 g/dL    RDW 13.3 11.5 - 14.5 %    Platelets 154 150 - 450 x10*3/uL   Type and screen   Result Value Ref Range    ABO TYPE O     Rh TYPE POS     ANTIBODY SCREEN NEG    POCT GLUCOSE   Result Value Ref Range    POCT Glucose 85 74 - 99 mg/dL   Electrocardiogram, 12-lead PRN ACS symptoms   Result Value Ref Range    Ventricular Rate 68 BPM    Atrial Rate 68 BPM    WY Interval 214 ms    QRS Duration 88 ms    QT Interval 430 ms    QTC Calculation(Bazett) 457 ms    P Axis 9 degrees    R Axis 22 degrees    T Axis 151 degrees    QRS Count 11 beats    Q Onset 222 ms    P Onset 115 ms    P Offset 173 ms    T Offset 437 ms    QTC Fredericia 448 ms   Heparin Assay, UFH   Result Value Ref Range    Heparin Unfractionated 0.2 See Comment Below for Therapeutic Ranges IU/mL   Magnesium   Result Value Ref Range    Magnesium 2.22 1.60 - 2.40 mg/dL   CBC and Auto Differential   Result Value Ref Range    WBC 7.9 4.4 - 11.3 x10*3/uL    nRBC 0.0 0.0 - 0.0 /100 WBCs    RBC 4.27 (L) 4.50 - 5.90 x10*6/uL    Hemoglobin 13.5 13.5 - 17.5 g/dL    Hematocrit 37.4 (L) 41.0 - 52.0 %    MCV 88 80 - 100 fL    MCH 31.6 26.0 - 34.0 pg    MCHC 36.1 (H) 32.0 - 36.0 g/dL    RDW 13.2 11.5 - 14.5 %    Platelets 163 150 - 450 x10*3/uL    Neutrophils % 52.4 40.0 - 80.0 %    Immature Granulocytes %, Automated 0.1 0.0 - 0.9 %    Lymphocytes % 35.8 13.0 - 44.0 %    Monocytes % 7.5 2.0 - 10.0 %    Eosinophils % 3.2 0.0 - 6.0 %    Basophils % 1.0 0.0 - 2.0 %    Neutrophils Absolute 4.11 1.20 - 7.70 x10*3/uL    Immature Granulocytes Absolute, Automated 0.01 0.00 - 0.70 x10*3/uL    Lymphocytes Absolute 2.81 1.20 - 4.80 x10*3/uL    Monocytes Absolute 0.59 0.10 - 1.00 x10*3/uL    Eosinophils Absolute 0.25 0.00 - 0.70 x10*3/uL     Basophils Absolute 0.08 0.00 - 0.10 x10*3/uL   Comprehensive Metabolic Panel   Result Value Ref Range    Glucose 85 74 - 99 mg/dL    Sodium 139 136 - 145 mmol/L    Potassium 4.1 3.5 - 5.3 mmol/L    Chloride 104 98 - 107 mmol/L    Bicarbonate 25 21 - 32 mmol/L    Anion Gap 14 10 - 20 mmol/L    Urea Nitrogen 27 (H) 6 - 23 mg/dL    Creatinine 0.98 0.50 - 1.30 mg/dL    eGFR 83 >60 mL/min/1.73m*2    Calcium 9.1 8.6 - 10.6 mg/dL    Albumin 3.9 3.4 - 5.0 g/dL    Alkaline Phosphatase 66 33 - 136 U/L    Total Protein 6.6 6.4 - 8.2 g/dL    AST 15 9 - 39 U/L    Bilirubin, Total 0.4 0.0 - 1.2 mg/dL    ALT 12 10 - 52 U/L   Heparin Assay, UFH   Result Value Ref Range    Heparin Unfractionated 0.2 See Comment Below for Therapeutic Ranges IU/mL   AST   Result Value Ref Range    AST 14 9 - 39 U/L   ALT   Result Value Ref Range    ALT 10 10 - 52 U/L   Renal function panel   Result Value Ref Range    Glucose 86 74 - 99 mg/dL    Sodium 140 136 - 145 mmol/L    Potassium 4.2 3.5 - 5.3 mmol/L    Chloride 104 98 - 107 mmol/L    Bicarbonate 21 21 - 32 mmol/L    Anion Gap 19 10 - 20 mmol/L    Urea Nitrogen 27 (H) 6 - 23 mg/dL    Creatinine 1.02 0.50 - 1.30 mg/dL    eGFR 79 >60 mL/min/1.73m*2    Calcium 8.9 8.6 - 10.6 mg/dL    Phosphorus 3.8 2.5 - 4.9 mg/dL    Albumin 3.9 3.4 - 5.0 g/dL   Coagulation Screen   Result Value Ref Range    Protime 11.2 9.8 - 12.8 seconds    INR 1.0 0.9 - 1.1    aPTT 38 27 - 38 seconds   Heparin Assay, UFH   Result Value Ref Range    Heparin Unfractionated 0.3 See Comment Below for Therapeutic Ranges IU/mL   Heparin Assay, UFH   Result Value Ref Range    Heparin Unfractionated 0.4 See Comment Below for Therapeutic Ranges IU/mL   Transthoracic Echo (TTE) Complete   Result Value Ref Range    AV pk joel 0.98 m/s    LVOT diam 2.10 cm    MV avg E/e' ratio 8.10     MV E/A ratio 0.56     LA vol index A/L 31.9 ml/m2    Tricuspid annular plane systolic excursion 2.8 cm    LV EF 40 %    RV free wall pk S' 10.10 cm/s    Aortic  Valve Area by Continuity of Peak Velocity 3.13 cm2    AV pk grad 3.9 mmHg    LV A4C EF 54.2    Hepatic Function Panel   Result Value Ref Range    Albumin 3.8 3.4 - 5.0 g/dL    Bilirubin, Total 0.6 0.0 - 1.2 mg/dL    Bilirubin, Direct 0.1 0.0 - 0.3 mg/dL    Alkaline Phosphatase 66 33 - 136 U/L    ALT 9 (L) 10 - 52 U/L    AST 14 9 - 39 U/L    Total Protein 6.6 6.4 - 8.2 g/dL   TSH with reflex to Free T4 if abnormal   Result Value Ref Range    Thyroid Stimulating Hormone 0.33 (L) 0.44 - 3.98 mIU/L   Thyroxine, Free   Result Value Ref Range    Thyroxine, Free 1.03 0.78 - 1.48 ng/dL   Urinalysis with Reflex Culture and Microscopic   Result Value Ref Range    Color, Urine Light-Yellow Light-Yellow, Yellow, Dark-Yellow    Appearance, Urine Clear Clear    Specific Gravity, Urine 1.009 1.005 - 1.035    pH, Urine 6.5 5.0, 5.5, 6.0, 6.5, 7.0, 7.5, 8.0    Protein, Urine NEGATIVE NEGATIVE, 10 (TRACE), 20 (TRACE) mg/dL    Glucose, Urine Normal Normal mg/dL    Blood, Urine NEGATIVE NEGATIVE    Ketones, Urine NEGATIVE NEGATIVE mg/dL    Bilirubin, Urine NEGATIVE NEGATIVE    Urobilinogen, Urine Normal Normal mg/dL    Nitrite, Urine NEGATIVE NEGATIVE    Leukocyte Esterase, Urine NEGATIVE NEGATIVE               ASSESSMENT & PLAN  Mr. Felice Almonte is a 70 yo M with PMHx of DMT2, HTN, who presents as a transfer from [facility] following C which demonstrated critical LM disease.      Pt states he has noticed chest pain, on and off, for approximately 10 months worse the last 4-6 months. He describes the pain as substernal, burning pain, usually exacerbated with exertion, and improves with 10-15 minutes rest. Also endorses some associated SOB. Denies any radiation of the pain, diaphoresis, nausea/vomiting, or any other symptoms. He states the symptoms have worsened recently, and were most notable last week Tuesday while he was working. He is very physically active, and has been somewhat limited by the symptoms recently, so his sister arranged  for him to have a LHC. Denies any recent bilateral lower extremity edema. LHC demonstrated critical ostial LM disease.    Cardiac surgery consulted for CABG evaluation.       RECOMMENDATIONS/PLAN  Dr. Paul Shoemaker met with patient and family and reviewed imaging and data.    - Plan for OR 6/28 Friday for MIDCAB  - TTE obtained EF 40%  - LHC in EMR  - Medical optimization per primary team  - Preop risk stratification studies/labs ordered in EMR by our team  --- US Carotids  --- PFTs (spirometry and room air ABG)  --- 2 view CXR  --- MRSA, UA/Culture, LFTs, HgbA1c, TSH/T4, Lipid panel  --- Please obtain a CT chest non con  - Dental evaluation not indicated for isolated CAB surgeries   - Continue ASA, high-intensity statin, and BB (or document contraindications for use)  - No ACEi/ARBs in the pre-op period (at least 48 hours)  - Hold any SGLT2 inhibitors (Farxiga, Jardiance, etc.) for at least 3 days prior to cardiac surgery to prevent euglycemic DKA  - No antiplatelets other than ASA, no anticoagulants other than Heparin  - NPO after midnight, blood on hold/T&S, and preop scrubs ordered for OR 6/28      Thank you for the consultation.   Patient educated and all questions answered.  Please page the cardiac surgery consult pager [phone] with any questions or changes in patient condition.    Ella Padilla PA-C  Cardiac Surgery Consult ANTOINETTE  [facility]  Cardiac Surgery Consult Pager [phone]     6/27/2024  4:37 PM

## 2024-06-27 NOTE — PROGRESS NOTES
1/1 CICU    TRANSFER  F:  Avtar    ADMITTING DX  critical LM disease found after abnormal stress test and cardiac cath.    SIGNIFICANT EVENTS  06/27 C: Cardiac Surgery for CABG eval - per today's notes, reviewed & determined no emergent surgery but to follow    DC PLAN  TBD - Care Transitions is following to develop a safe & supportive discharge plan in collaboration with multidisciplinary team (&) patient/family/significant others.      [facility]     PT/OT   At time of this note, no orders    COMPLETED  (X) 06/27 - New to author/unit. EPIC reviewed & address, PCP - none, pharmacy verified  (X) 06/27 - DC/SDOH Assessments with sister @ bedside (Maricruz)     NOTE  Family states that patient lives in two family home with niece (Ariadna), her spouse, and family in other 2nd half of home with many families nearby in [place] [facility] [place]. Patient never  and has worked physical jobs for all his life.  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  Sister Maricruz had this author update contacts and had another sister Maci Saldana.  Patient with many family members and support. Maricruz conveyed that important to know that brother (1y younger) is at home with hospice and believe little time left. Brother is niece Ariadna's father and he is at her house with hospice. Maricruz asked that to keep Maricruz on contacts but to contact Maricruz or Maci.  Due to this information patient is very c/f not getting home from hospital to be with brother as he passes. Also patient [place] and has hearing aids.  Per sister Maricruz, patient lifelong bachelor and somewhat quiet when around strangers. No PCP because according to Maricruz, no medical needed prior and only sees an ear doctor for hearing loss. No PCP ever and no meds prior to admit.     Reema Abbott (LSW, MSW)

## 2024-06-27 NOTE — PROGRESS NOTES
"Felice Almonte is a 69 y.o. male on day 1 of admission presenting with Coronary artery disease involving native coronary artery of native heart with unstable angina pectoris (Multi).    Subjective   No acute events overnight. Pt denies any chest pain or SOB currently. States he is anxious to eat breakfast. Otherwise denies any acute concerns or complaints.     Patient denies any fever, chills, lightheadedness, shortness of breath, chest pain, abdominal pain, N/V/C/D, lower extremity pain/swelling.    10 point ROS performed and negative unless stated above.          Objective   Weight: 64.7 kg (142 lb 9.6 oz) (06/26/24 1629)    Daily Weight  06/27/24 : 67.3 kg (148 lb 5.9 oz)      Last Recorded Vitals  Heart Rate:  [61-87]   Temp:  [36 °C (96.8 °F)-36.6 °C (97.9 °F)]   Resp:  [12-19]   BP: ()/(60-94)   Height:  [172.7 cm (5' 8\")]   Weight:  [64.7 kg (142 lb 9.6 oz)-67.3 kg (148 lb 5.9 oz)]   SpO2:  [95 %-98 %]          Intake/Output last 3 Shifts:  I/O last 3 completed shifts:  In: 106.6 (1.6 mL/kg) [I.V.:106.6 (1.6 mL/kg)]  Out: 1225 (18.2 mL/kg) [Urine:1225 (0.5 mL/kg/hr)]  Weight: 67.3 kg       Relevant Results  Results from last 7 days   Lab Units 06/27/24  0134 06/26/24  1648   WBC AUTO x10*3/uL 7.9 7.0  6.8   HEMOGLOBIN g/dL 13.5 14.6  14.6   HEMATOCRIT % 37.4* 44.6  43.9   PLATELETS AUTO x10*3/uL 163 154  172     Results from last 7 days   Lab Units 06/27/24  0134 06/26/24  1648   SODIUM mmol/L 140  139 137   POTASSIUM mmol/L 4.2  4.1 4.8   CO2 mmol/L 21  25 24   ANION GAP mmol/L 19  14 15   BUN mg/dL 27*  27* 22   CREATININE mg/dL 1.02  0.98 0.87   GLUCOSE mg/dL 86  85 70*   EGFR mL/min/1.73m*2 79  83 >90   MAGNESIUM mg/dL 2.22 2.48*   PHOSPHORUS mg/dL 3.8 3.5      Results from last 7 days   Lab Units 06/27/24  1241 06/27/24  0134 06/26/24  1648   ALT U/L 9* 10  12 11   AST U/L 14 14  15 16   ALK PHOS U/L 66 66 66      Results from last 7 days   Lab Units 06/27/24  0134 06/26/24  1648   INR  " 1.0 1.0     Results from last 7 days   Lab Units 06/26/24  1648   FIO2 % 21     Results from last 7 days   Lab Units 06/26/24  1648   POCT PH, VENOUS pH 7.38   POCT PCO2, VENOUS mm Hg 41     Results from last 7 days   Lab Units 06/27/24  1241 06/26/24  1648   FREE T4 ng/dL 1.03 1.39             Physical Exam  General: well-developed, well-nourished, no acute distress, AAOx3  HEENT: EOM intact, PERRL, no JVD  CV: regular rate and rhythm, normal S1/S2, no murmur, gallop, or rub, prominent PMI  Pulm: normal respiratory effort, clear to auscultation bilaterally with no wheezes, rales, rhonchi  Abd: soft, nontender, nondistended, no masses, normoactive bowel sounds  Extremities: warm, no LE edema  Neuro: moves all extremities equally, CN II - XII grossly intact  Psych: normal mood and affect  Skin: warm, dry, intact      Medications    aspirin, 81 mg, oral, Daily  chlorhexidine, 15 mL, Mouth/Throat, BID  insulin lispro, 0-5 Units, subcutaneous, TID  metoprolol tartrate, 25 mg, oral, BID  mupirocin, , Topical, BID  rosuvastatin, 40 mg, oral, Nightly        heparin, 0-4,000 Units/hr, Last Rate: 1,000 Units/hr (06/27/24 0218)        PRN medications: dextrose, dextrose, glucagon, glucagon            Assessment/Plan   Principal Problem:    Coronary artery disease involving native coronary artery of native heart with unstable angina pectoris (Multi)  Active Problems:    Prediabetes    Myocardiopathy (Multi)    Mr. Felice Almonte is a 70 yo M with PMHx of DMT2, HTN, who presents as a transfer from [facility] following [facility] which demonstrated critical LM disease. Will start pt on hep gtt, and initiate nitroglycerin gtt as needed for chest pain. Will plan for cardiac surgery consult for CABG evaluation.     Neuro  No active issues     Cardiac  #CAD with critical LM disease seen on [facility] 6/26  -Loaded with , continue 81mg daily  -Hep gtt  -TTE 6/27/24:    1. Basal and mid anterior wall and basal and mid anterior septum are  abnormal.   2. Left ventricular ejection fraction is moderately decreased, by visual estimate at 40%.    3. There is global hypokinesis of the left ventricle with minor regional variations.   4. Spectral Doppler shows an impaired relaxation pattern of left ventricular d iastolic filling.    5. There is normal right ventricular global systolic function.  -Rosuvastatin 40mg daily  -Metoprolol tartrate 25 mg BID  -nitroglycerin gtt as needed for chest pain  -Cardiac surgery consult     Pulm  No active issues     Endo  #Prediabetes  -A1c 5.7% on admission  -Carb controlled diet     GI  No active issues     Renal  No active issues     ID  No active issues     MSK/Rheum  No active issues        F: bolus PRN  E: replete PRN  N: Cardiac/diabetic  A: PIV    DVT PPx: SCDs, hep gtt  GI PPx: N/A     CODE STATUS: Full code (confirmed on admission)  SURROGATE DECISION MAKER: Ariadna Saldana (niece) [phone]

## 2024-06-28 ENCOUNTER — APPOINTMENT (OUTPATIENT)
Dept: RESPIRATORY THERAPY | Facility: HOSPITAL | Age: 69
DRG: 235 | End: 2024-06-28
Payer: COMMERCIAL

## 2024-06-28 ENCOUNTER — APPOINTMENT (OUTPATIENT)
Dept: RADIOLOGY | Facility: HOSPITAL | Age: 69
DRG: 235 | End: 2024-06-28
Payer: COMMERCIAL

## 2024-06-28 ENCOUNTER — ANESTHESIA (OUTPATIENT)
Dept: OPERATING ROOM | Facility: HOSPITAL | Age: 69
End: 2024-06-28
Payer: COMMERCIAL

## 2024-06-28 ENCOUNTER — APPOINTMENT (OUTPATIENT)
Dept: VASCULAR MEDICINE | Facility: HOSPITAL | Age: 69
DRG: 235 | End: 2024-06-28
Payer: COMMERCIAL

## 2024-06-28 ENCOUNTER — HOSPITAL ENCOUNTER (OUTPATIENT)
Dept: OPERATING ROOM | Facility: HOSPITAL | Age: 69
Discharge: HOME | End: 2024-06-28

## 2024-06-28 PROBLEM — I20.9 ANGINA PECTORIS (CMS-HCC): Status: ACTIVE | Noted: 2024-06-28

## 2024-06-28 PROBLEM — I25.5 ISCHEMIC CARDIOMYOPATHY: Status: ACTIVE | Noted: 2024-06-28

## 2024-06-28 LAB
ACT BLD: 153 SEC (ref 82–174)
ACT BLD: 214 SEC (ref 82–174)
ACT BLD: 219 SEC (ref 82–174)
ACT BLD: 233 SEC (ref 82–174)
ACT BLD: 250 SEC (ref 82–174)
ACT BLD: 274 SEC (ref 82–174)
ACT BLD: 381 SEC (ref 82–174)
ACT BLD: 478 SEC (ref 82–174)
ACT BLD: 480 SEC (ref 82–174)
ACT BLD: 483 SEC (ref 82–174)
ACT BLD: 490 SEC (ref 82–174)
ACT BLD: 96 SEC (ref 82–174)
ACT BLD: 98 SEC (ref 82–174)
ACT BLD: 98 SEC (ref 82–174)
ALBUMIN SERPL BCP-MCNC: 4 G/DL (ref 3.4–5)
ALBUMIN SERPL BCP-MCNC: 4.1 G/DL (ref 3.4–5)
ALP SERPL-CCNC: 68 U/L (ref 33–136)
ALT SERPL W P-5'-P-CCNC: 12 U/L (ref 10–52)
ANION GAP BLDA CALCULATED.4IONS-SCNC: 11 MMO/L (ref 10–25)
ANION GAP BLDA CALCULATED.4IONS-SCNC: 12 MMO/L (ref 10–25)
ANION GAP BLDA CALCULATED.4IONS-SCNC: 12 MMO/L (ref 10–25)
ANION GAP BLDA CALCULATED.4IONS-SCNC: 13 MMO/L (ref 10–25)
ANION GAP BLDA CALCULATED.4IONS-SCNC: 15 MMO/L (ref 10–25)
ANION GAP BLDA CALCULATED.4IONS-SCNC: 16 MMO/L (ref 10–25)
ANION GAP BLDA CALCULATED.4IONS-SCNC: 17 MMO/L (ref 10–25)
ANION GAP BLDA CALCULATED.4IONS-SCNC: 17 MMO/L (ref 10–25)
ANION GAP BLDA CALCULATED.4IONS-SCNC: 19 MMO/L (ref 10–25)
ANION GAP BLDV CALCULATED.4IONS-SCNC: 13 MMOL/L (ref 10–25)
ANION GAP SERPL CALC-SCNC: 14 MMOL/L (ref 10–20)
ANION GAP SERPL CALC-SCNC: 22 MMOL/L (ref 10–20)
APTT PPP: 31 SECONDS (ref 27–38)
AST SERPL W P-5'-P-CCNC: 14 U/L (ref 9–39)
BASE EXCESS BLDA CALC-SCNC: -0.5 MMOL/L (ref -2–3)
BASE EXCESS BLDA CALC-SCNC: -1.2 MMOL/L (ref -2–3)
BASE EXCESS BLDA CALC-SCNC: -1.5 MMOL/L (ref -2–3)
BASE EXCESS BLDA CALC-SCNC: -2.7 MMOL/L (ref -2–3)
BASE EXCESS BLDA CALC-SCNC: -3.4 MMOL/L (ref -2–3)
BASE EXCESS BLDA CALC-SCNC: -5.1 MMOL/L (ref -2–3)
BASE EXCESS BLDA CALC-SCNC: -6.5 MMOL/L (ref -2–3)
BASE EXCESS BLDA CALC-SCNC: -6.7 MMOL/L (ref -2–3)
BASE EXCESS BLDA CALC-SCNC: 0 MMOL/L (ref -2–3)
BASE EXCESS BLDA CALC-SCNC: 0 MMOL/L (ref -2–3)
BASE EXCESS BLDA CALC-SCNC: 1.2 MMOL/L (ref -2–3)
BASE EXCESS BLDV CALC-SCNC: 7 MMOL/L (ref -2–3)
BASOPHILS # BLD AUTO: 0.06 X10*3/UL (ref 0–0.1)
BASOPHILS NFR BLD AUTO: 0.8 %
BILIRUB SERPL-MCNC: 0.4 MG/DL (ref 0–1.2)
BODY TEMPERATURE: 37 DEGREES CELSIUS
BUN SERPL-MCNC: 18 MG/DL (ref 6–23)
BUN SERPL-MCNC: 22 MG/DL (ref 6–23)
CA-I BLD-SCNC: 1.09 MMOL/L (ref 1.1–1.33)
CA-I BLDA-SCNC: 0.98 MMOL/L (ref 1.1–1.33)
CA-I BLDA-SCNC: 0.98 MMOL/L (ref 1.1–1.33)
CA-I BLDA-SCNC: 1.01 MMOL/L (ref 1.1–1.33)
CA-I BLDA-SCNC: 1.06 MMOL/L (ref 1.1–1.33)
CA-I BLDA-SCNC: 1.1 MMOL/L (ref 1.1–1.33)
CA-I BLDA-SCNC: 1.15 MMOL/L (ref 1.1–1.33)
CA-I BLDA-SCNC: 1.16 MMOL/L (ref 1.1–1.33)
CA-I BLDA-SCNC: 1.18 MMOL/L (ref 1.1–1.33)
CA-I BLDA-SCNC: 1.21 MMOL/L (ref 1.1–1.33)
CA-I BLDA-SCNC: 1.27 MMOL/L (ref 1.1–1.33)
CA-I BLDA-SCNC: 1.42 MMOL/L (ref 1.1–1.33)
CA-I BLDV-SCNC: 0.95 MMOL/L (ref 1.1–1.33)
CALCIUM SERPL-MCNC: 8.5 MG/DL (ref 8.6–10.6)
CALCIUM SERPL-MCNC: 9 MG/DL (ref 8.6–10.6)
CFT FORM KAOLIN IND BLD RES TEG: 2.2 MIN (ref 0.8–2.1)
CFT FORM KAOLIN IND BLD RES TEG: 2.7 MIN (ref 0.8–2.1)
CHLORIDE BLDA-SCNC: 103 MMOL/L (ref 98–107)
CHLORIDE BLDA-SCNC: 104 MMOL/L (ref 98–107)
CHLORIDE BLDA-SCNC: 104 MMOL/L (ref 98–107)
CHLORIDE BLDA-SCNC: 105 MMOL/L (ref 98–107)
CHLORIDE BLDA-SCNC: 106 MMOL/L (ref 98–107)
CHLORIDE BLDA-SCNC: 107 MMOL/L (ref 98–107)
CHLORIDE BLDA-SCNC: 107 MMOL/L (ref 98–107)
CHLORIDE BLDV-SCNC: 102 MMOL/L (ref 98–107)
CHLORIDE SERPL-SCNC: 103 MMOL/L (ref 98–107)
CHLORIDE SERPL-SCNC: 106 MMOL/L (ref 98–107)
CLOT ANGLE.KAOLIN INDUCED BLD RES TEG: 60 DEG (ref 63–78)
CLOT ANGLE.KAOLIN INDUCED BLD RES TEG: 63 DEG (ref 63–78)
CLOT INIT KAO IND P HEP NEUT BLD RES TEG: 10.2 MIN (ref 4.6–9.1)
CLOT INIT KAO IND P HEP NEUT BLD RES TEG: 12.9 MIN (ref 4.6–9.1)
CLOT INIT KAO IND P HEP NEUT BLD RES TEG: 5 MIN (ref 4.3–8.3)
CLOT INIT KAO IND P HEP NEUT BLD RES TEG: 9.5 MIN (ref 4.3–8.3)
CO2 SERPL-SCNC: 19 MMOL/L (ref 21–32)
CO2 SERPL-SCNC: 26 MMOL/L (ref 21–32)
COHGB MFR BLDA: 1.4 %
COHGB MFR BLDA: 1.6 %
COHGB MFR BLDA: 1.7 %
COHGB MFR BLDA: 1.7 %
COHGB MFR BLDA: 2.1 %
COHGB MFR BLDV: 2 %
CREAT SERPL-MCNC: 0.99 MG/DL (ref 0.5–1.3)
CREAT SERPL-MCNC: 1.06 MG/DL (ref 0.5–1.3)
DO-HGB MFR BLDA: 0 % (ref 0–5)
DO-HGB MFR BLDA: 1.2 % (ref 0–5)
DO-HGB MFR BLDA: 2.6 % (ref 0–5)
EGFRCR SERPLBLD CKD-EPI 2021: 75 ML/MIN/1.73M*2
EGFRCR SERPLBLD CKD-EPI 2021: 82 ML/MIN/1.73M*2
EOSINOPHIL # BLD AUTO: 0.31 X10*3/UL (ref 0–0.7)
EOSINOPHIL NFR BLD AUTO: 4.2 %
ERYTHROCYTE [DISTWIDTH] IN BLOOD BY AUTOMATED COUNT: 13.2 % (ref 11.5–14.5)
ERYTHROCYTE [DISTWIDTH] IN BLOOD BY AUTOMATED COUNT: 13.2 % (ref 11.5–14.5)
FIBRINOGEN BLD CALC-MCNC: 215 MG/DL (ref 278–581)
FIBRINOGEN BLD CALC-MCNC: 328 MG/DL (ref 278–581)
FIBRINOGEN PPP-MCNC: 191 MG/DL (ref 200–400)
GLUCOSE BLD MANUAL STRIP-MCNC: 102 MG/DL (ref 74–99)
GLUCOSE BLD MANUAL STRIP-MCNC: 112 MG/DL (ref 74–99)
GLUCOSE BLDA-MCNC: 110 MG/DL (ref 74–99)
GLUCOSE BLDA-MCNC: 135 MG/DL (ref 74–99)
GLUCOSE BLDA-MCNC: 139 MG/DL (ref 74–99)
GLUCOSE BLDA-MCNC: 147 MG/DL (ref 74–99)
GLUCOSE BLDA-MCNC: 150 MG/DL (ref 74–99)
GLUCOSE BLDA-MCNC: 151 MG/DL (ref 74–99)
GLUCOSE BLDA-MCNC: 151 MG/DL (ref 74–99)
GLUCOSE BLDA-MCNC: 153 MG/DL (ref 74–99)
GLUCOSE BLDA-MCNC: 160 MG/DL (ref 74–99)
GLUCOSE BLDA-MCNC: 165 MG/DL (ref 74–99)
GLUCOSE BLDA-MCNC: 87 MG/DL (ref 74–99)
GLUCOSE BLDV-MCNC: 165 MG/DL (ref 74–99)
GLUCOSE SERPL-MCNC: 111 MG/DL (ref 74–99)
GLUCOSE SERPL-MCNC: 148 MG/DL (ref 74–99)
HCO3 BLDA-SCNC: 19.7 MMOL/L (ref 22–26)
HCO3 BLDA-SCNC: 20.2 MMOL/L (ref 22–26)
HCO3 BLDA-SCNC: 20.6 MMOL/L (ref 22–26)
HCO3 BLDA-SCNC: 22.7 MMOL/L (ref 22–26)
HCO3 BLDA-SCNC: 23.2 MMOL/L (ref 22–26)
HCO3 BLDA-SCNC: 23.7 MMOL/L (ref 22–26)
HCO3 BLDA-SCNC: 24.2 MMOL/L (ref 22–26)
HCO3 BLDA-SCNC: 24.3 MMOL/L (ref 22–26)
HCO3 BLDA-SCNC: 24.8 MMOL/L (ref 22–26)
HCO3 BLDA-SCNC: 24.8 MMOL/L (ref 22–26)
HCO3 BLDA-SCNC: 26 MMOL/L (ref 22–26)
HCO3 BLDV-SCNC: 32.8 MMOL/L (ref 22–26)
HCT VFR BLD AUTO: 28 % (ref 41–52)
HCT VFR BLD AUTO: 38.6 % (ref 41–52)
HCT VFR BLD EST: 28 % (ref 41–52)
HCT VFR BLD EST: 29 % (ref 41–52)
HCT VFR BLD EST: 30 % (ref 41–52)
HCT VFR BLD EST: 31 % (ref 41–52)
HCT VFR BLD EST: 35 % (ref 41–52)
HCT VFR BLD EST: 36 % (ref 41–52)
HCT VFR BLD EST: 42 % (ref 41–52)
HCT VFR BLD EST: 43 % (ref 41–52)
HGB BLD-MCNC: 13.3 G/DL (ref 13.5–17.5)
HGB BLD-MCNC: 9.8 G/DL (ref 13.5–17.5)
HGB BLDA-MCNC: 10 G/DL (ref 13.5–17.5)
HGB BLDA-MCNC: 10.1 G/DL (ref 13.5–17.5)
HGB BLDA-MCNC: 10.1 G/DL (ref 13.5–17.5)
HGB BLDA-MCNC: 10.2 G/DL (ref 13.5–17.5)
HGB BLDA-MCNC: 10.2 G/DL (ref 13.5–17.5)
HGB BLDA-MCNC: 11.8 G/DL (ref 13.5–17.5)
HGB BLDA-MCNC: 11.8 G/DL (ref 13.5–17.5)
HGB BLDA-MCNC: 12.1 G/DL (ref 13.5–17.5)
HGB BLDA-MCNC: 12.1 G/DL (ref 13.5–17.5)
HGB BLDA-MCNC: 13.9 G/DL (ref 13.5–17.5)
HGB BLDA-MCNC: 14.2 G/DL (ref 13.5–17.5)
HGB BLDA-MCNC: 14.2 G/DL (ref 13.5–17.5)
HGB BLDA-MCNC: 9.5 G/DL (ref 13.5–17.5)
HGB BLDA-MCNC: 9.7 G/DL (ref 13.5–17.5)
HGB BLDA-MCNC: 9.7 G/DL (ref 13.5–17.5)
HGB BLDV-MCNC: 9.3 G/DL (ref 13.5–17.5)
HOLD SPECIMEN: NORMAL
IMM GRANULOCYTES # BLD AUTO: 0.02 X10*3/UL (ref 0–0.7)
IMM GRANULOCYTES NFR BLD AUTO: 0.3 % (ref 0–0.9)
INHALED O2 CONCENTRATION: 100 %
INHALED O2 CONCENTRATION: 21 %
INHALED O2 CONCENTRATION: 50 %
INHALED O2 CONCENTRATION: 80 %
INR PPP: 1.2 (ref 0.9–1.1)
LACTATE BLDA-SCNC: 1.2 MMOL/L (ref 0.4–2)
LACTATE BLDA-SCNC: 1.7 MMOL/L (ref 0.4–2)
LACTATE BLDA-SCNC: 2.8 MMOL/L (ref 0.4–2)
LACTATE BLDA-SCNC: 3.8 MMOL/L (ref 0.4–2)
LACTATE BLDA-SCNC: 4 MMOL/L (ref 0.4–2)
LACTATE BLDA-SCNC: 4 MMOL/L (ref 0.4–2)
LACTATE BLDA-SCNC: 4.2 MMOL/L (ref 0.4–2)
LACTATE BLDA-SCNC: 4.3 MMOL/L (ref 0.4–2)
LACTATE BLDA-SCNC: 4.4 MMOL/L (ref 0.4–2)
LACTATE BLDA-SCNC: 5.9 MMOL/L (ref 0.4–2)
LACTATE BLDA-SCNC: 8.2 MMOL/L (ref 0.4–2)
LACTATE BLDV-SCNC: 4.4 MMOL/L (ref 0.4–2)
LYMPHOCYTES # BLD AUTO: 2.4 X10*3/UL (ref 1.2–4.8)
LYMPHOCYTES NFR BLD AUTO: 32.7 %
MA KAOLIN BLD RES TEG: 48 MM (ref 52–69)
MA KAOLIN BLD RES TEG: 50 MM (ref 52–69)
MA KAOLIN+TF BLD RES TEG: 47 MM (ref 52–70)
MA KAOLIN+TF BLD RES TEG: 57 MM (ref 52–70)
MA TF IND+IIB-IIIA INH BLD RES TEG: 12 MM (ref 15–32)
MA TF IND+IIB-IIIA INH BLD RES TEG: 18 MM (ref 15–32)
MAGNESIUM SERPL-MCNC: 2.09 MG/DL (ref 1.6–2.4)
MAGNESIUM SERPL-MCNC: 2.2 MG/DL (ref 1.6–2.4)
MCH RBC QN AUTO: 31.7 PG (ref 26–34)
MCH RBC QN AUTO: 31.8 PG (ref 26–34)
MCHC RBC AUTO-ENTMCNC: 34.5 G/DL (ref 32–36)
MCHC RBC AUTO-ENTMCNC: 35 G/DL (ref 32–36)
MCV RBC AUTO: 91 FL (ref 80–100)
MCV RBC AUTO: 92 FL (ref 80–100)
METHGB MFR BLDA: 0.7 % (ref 0–1.5)
METHGB MFR BLDA: 0.8 % (ref 0–1.5)
METHGB MFR BLDA: 0.9 % (ref 0–1.5)
METHGB MFR BLDA: 1 % (ref 0–1.5)
METHGB MFR BLDA: 1 % (ref 0–1.5)
METHGB MFR BLDA: 1.3 % (ref 0–1.5)
METHGB MFR BLDV: 0.7 % (ref 0–1.5)
MGC ASCENT PFT - FEV1 - PRE: 2.51
MGC ASCENT PFT - FEV1 - PREDICTED: 2.85
MGC ASCENT PFT - FVC - PRE: 3.51
MGC ASCENT PFT - FVC - PREDICTED: 3.74
MONOCYTES # BLD AUTO: 0.7 X10*3/UL (ref 0.1–1)
MONOCYTES NFR BLD AUTO: 9.5 %
NEUTROPHILS # BLD AUTO: 3.86 X10*3/UL (ref 1.2–7.7)
NEUTROPHILS NFR BLD AUTO: 52.5 %
NRBC BLD-RTO: 0 /100 WBCS (ref 0–0)
NRBC BLD-RTO: 0 /100 WBCS (ref 0–0)
OXYHGB MFR BLDA: 94.6 % (ref 94–98)
OXYHGB MFR BLDA: 94.6 % (ref 94–98)
OXYHGB MFR BLDA: 95.2 % (ref 94–98)
OXYHGB MFR BLDA: 96.3 % (ref 94–98)
OXYHGB MFR BLDA: 96.3 % (ref 94–98)
OXYHGB MFR BLDA: 97.2 % (ref 94–98)
OXYHGB MFR BLDA: 97.3 % (ref 94–98)
OXYHGB MFR BLDA: 97.3 % (ref 94–98)
OXYHGB MFR BLDA: 97.4 % (ref 94–98)
OXYHGB MFR BLDA: 97.4 % (ref 94–98)
OXYHGB MFR BLDA: 97.5 % (ref 94–98)
OXYHGB MFR BLDA: 97.5 % (ref 94–98)
OXYHGB MFR BLDA: 97.6 % (ref 94–98)
OXYHGB MFR BLDA: 97.8 % (ref 94–98)
OXYHGB MFR BLDV: 82.3 % (ref 45–75)
PCO2 BLDA: 39 MM HG (ref 38–42)
PCO2 BLDA: 40 MM HG (ref 38–42)
PCO2 BLDA: 41 MM HG (ref 38–42)
PCO2 BLDA: 41 MM HG (ref 38–42)
PCO2 BLDA: 42 MM HG (ref 38–42)
PCO2 BLDA: 43 MM HG (ref 38–42)
PCO2 BLDA: 44 MM HG (ref 38–42)
PCO2 BLDV: 53 MM HG (ref 41–51)
PH BLDA: 7.27 PH (ref 7.38–7.42)
PH BLDA: 7.28 PH (ref 7.38–7.42)
PH BLDA: 7.32 PH (ref 7.38–7.42)
PH BLDA: 7.32 PH (ref 7.38–7.42)
PH BLDA: 7.33 PH (ref 7.38–7.42)
PH BLDA: 7.36 PH (ref 7.38–7.42)
PH BLDA: 7.37 PH (ref 7.38–7.42)
PH BLDA: 7.4 PH (ref 7.38–7.42)
PH BLDA: 7.41 PH (ref 7.38–7.42)
PH BLDV: 7.4 PH (ref 7.33–7.43)
PHOSPHATE SERPL-MCNC: 4.1 MG/DL (ref 2.5–4.9)
PLATELET # BLD AUTO: 143 X10*3/UL (ref 150–450)
PLATELET # BLD AUTO: 154 X10*3/UL (ref 150–450)
PO2 BLDA: 117 MM HG (ref 85–95)
PO2 BLDA: 160 MM HG (ref 85–95)
PO2 BLDA: 229 MM HG (ref 85–95)
PO2 BLDA: 239 MM HG (ref 85–95)
PO2 BLDA: 323 MM HG (ref 85–95)
PO2 BLDA: 333 MM HG (ref 85–95)
PO2 BLDA: 389 MM HG (ref 85–95)
PO2 BLDA: 394 MM HG (ref 85–95)
PO2 BLDA: 70 MM HG (ref 85–95)
PO2 BLDA: 79 MM HG (ref 85–95)
PO2 BLDA: 85 MM HG (ref 85–95)
PO2 BLDV: 47 MM HG (ref 35–45)
POTASSIUM BLDA-SCNC: 3.8 MMOL/L (ref 3.5–5.3)
POTASSIUM BLDA-SCNC: 3.9 MMOL/L (ref 3.5–5.3)
POTASSIUM BLDA-SCNC: 4 MMOL/L (ref 3.5–5.3)
POTASSIUM BLDA-SCNC: 4 MMOL/L (ref 3.5–5.3)
POTASSIUM BLDA-SCNC: 4.1 MMOL/L (ref 3.5–5.3)
POTASSIUM BLDA-SCNC: 4.2 MMOL/L (ref 3.5–5.3)
POTASSIUM BLDA-SCNC: 4.3 MMOL/L (ref 3.5–5.3)
POTASSIUM BLDA-SCNC: 4.5 MMOL/L (ref 3.5–5.3)
POTASSIUM BLDA-SCNC: 4.6 MMOL/L (ref 3.5–5.3)
POTASSIUM BLDV-SCNC: 3.6 MMOL/L (ref 3.5–5.3)
POTASSIUM SERPL-SCNC: 4 MMOL/L (ref 3.5–5.3)
POTASSIUM SERPL-SCNC: 4.6 MMOL/L (ref 3.5–5.3)
PROT SERPL-MCNC: 6.9 G/DL (ref 6.4–8.2)
PROTHROMBIN TIME: 13.5 SECONDS (ref 9.8–12.8)
RBC # BLD AUTO: 3.09 X10*6/UL (ref 4.5–5.9)
RBC # BLD AUTO: 4.18 X10*6/UL (ref 4.5–5.9)
SAO2 % BLDA: 100 % (ref 94–100)
SAO2 % BLDA: 97 % (ref 94–100)
SAO2 % BLDA: 98 % (ref 94–100)
SAO2 % BLDA: 99 % (ref 94–100)
SAO2 % BLDV: 85 % (ref 45–75)
SODIUM BLDA-SCNC: 136 MMOL/L (ref 136–145)
SODIUM BLDA-SCNC: 137 MMOL/L (ref 136–145)
SODIUM BLDA-SCNC: 138 MMOL/L (ref 136–145)
SODIUM BLDA-SCNC: 139 MMOL/L (ref 136–145)
SODIUM BLDA-SCNC: 140 MMOL/L (ref 136–145)
SODIUM BLDV-SCNC: 144 MMOL/L (ref 136–145)
SODIUM SERPL-SCNC: 138 MMOL/L (ref 136–145)
SODIUM SERPL-SCNC: 143 MMOL/L (ref 136–145)
TEST COMMENT: ABNORMAL
TEST COMMENT: ABNORMAL
UFH PPP CHRO-ACNC: 0.4 IU/ML
WBC # BLD AUTO: 12.6 X10*3/UL (ref 4.4–11.3)
WBC # BLD AUTO: 7.4 X10*3/UL (ref 4.4–11.3)

## 2024-06-28 PROCEDURE — 2500000005 HC RX 250 GENERAL PHARMACY W/O HCPCS

## 2024-06-28 PROCEDURE — 83050 HGB METHEMOGLOBIN QUAN: CPT

## 2024-06-28 PROCEDURE — 2500000005 HC RX 250 GENERAL PHARMACY W/O HCPCS: Performed by: STUDENT IN AN ORGANIZED HEALTH CARE EDUCATION/TRAINING PROGRAM

## 2024-06-28 PROCEDURE — 93880 EXTRACRANIAL BILAT STUDY: CPT

## 2024-06-28 PROCEDURE — P9073 PLATELETS PHERESIS PATH REDU: HCPCS

## 2024-06-28 PROCEDURE — 36415 COLL VENOUS BLD VENIPUNCTURE: CPT

## 2024-06-28 PROCEDURE — 85384 FIBRINOGEN ACTIVITY: CPT

## 2024-06-28 PROCEDURE — 2500000004 HC RX 250 GENERAL PHARMACY W/ HCPCS (ALT 636 FOR OP/ED)

## 2024-06-28 PROCEDURE — 33509 NDSC HRV UXTR ART 1 SGM CAB: CPT | Performed by: THORACIC SURGERY (CARDIOTHORACIC VASCULAR SURGERY)

## 2024-06-28 PROCEDURE — 3600000011 HC PERFUSION TIME - INITIAL BASE CHARGE: Performed by: THORACIC SURGERY (CARDIOTHORACIC VASCULAR SURGERY)

## 2024-06-28 PROCEDURE — 99291 CRITICAL CARE FIRST HOUR: CPT | Performed by: STUDENT IN AN ORGANIZED HEALTH CARE EDUCATION/TRAINING PROGRAM

## 2024-06-28 PROCEDURE — 2720000007 HC OR 272 NO HCPCS: Performed by: THORACIC SURGERY (CARDIOTHORACIC VASCULAR SURGERY)

## 2024-06-28 PROCEDURE — 84132 ASSAY OF SERUM POTASSIUM: CPT | Performed by: STUDENT IN AN ORGANIZED HEALTH CARE EDUCATION/TRAINING PROGRAM

## 2024-06-28 PROCEDURE — 2500000001 HC RX 250 WO HCPCS SELF ADMINISTERED DRUGS (ALT 637 FOR MEDICARE OP)

## 2024-06-28 PROCEDURE — 2780000003 HC OR 278 NO HCPCS: Performed by: THORACIC SURGERY (CARDIOTHORACIC VASCULAR SURGERY)

## 2024-06-28 PROCEDURE — 71250 CT THORAX DX C-: CPT

## 2024-06-28 PROCEDURE — 85576 BLOOD PLATELET AGGREGATION: CPT

## 2024-06-28 PROCEDURE — P9037 PLATE PHERES LEUKOREDU IRRAD: HCPCS

## 2024-06-28 PROCEDURE — 85347 COAGULATION TIME ACTIVATED: CPT

## 2024-06-28 PROCEDURE — 84132 ASSAY OF SERUM POTASSIUM: CPT

## 2024-06-28 PROCEDURE — 82375 ASSAY CARBOXYHB QUANT: CPT

## 2024-06-28 PROCEDURE — 3600000012 HC PERFUSION TIME - EACH INCREMENTAL 1 MINUTE: Performed by: THORACIC SURGERY (CARDIOTHORACIC VASCULAR SURGERY)

## 2024-06-28 PROCEDURE — 5A1221Z PERFORMANCE OF CARDIAC OUTPUT, CONTINUOUS: ICD-10-PCS | Performed by: THORACIC SURGERY (CARDIOTHORACIC VASCULAR SURGERY)

## 2024-06-28 PROCEDURE — 33534 CABG ARTERIAL TWO: CPT | Performed by: THORACIC SURGERY (CARDIOTHORACIC VASCULAR SURGERY)

## 2024-06-28 PROCEDURE — 02100AW BYPASS CORONARY ARTERY, ONE ARTERY FROM AORTA WITH AUTOLOGOUS ARTERIAL TISSUE, OPEN APPROACH: ICD-10-PCS | Performed by: THORACIC SURGERY (CARDIOTHORACIC VASCULAR SURGERY)

## 2024-06-28 PROCEDURE — 3600000018 HC OR TIME - INITIAL BASE CHARGE - PROCEDURE LEVEL SIX: Performed by: THORACIC SURGERY (CARDIOTHORACIC VASCULAR SURGERY)

## 2024-06-28 PROCEDURE — 94010 BREATHING CAPACITY TEST: CPT

## 2024-06-28 PROCEDURE — P9045 ALBUMIN (HUMAN), 5%, 250 ML: HCPCS | Mod: JZ | Performed by: ANESTHESIOLOGIST ASSISTANT

## 2024-06-28 PROCEDURE — 2500000004 HC RX 250 GENERAL PHARMACY W/ HCPCS (ALT 636 FOR OP/ED): Performed by: STUDENT IN AN ORGANIZED HEALTH CARE EDUCATION/TRAINING PROGRAM

## 2024-06-28 PROCEDURE — 85027 COMPLETE CBC AUTOMATED: CPT | Performed by: STUDENT IN AN ORGANIZED HEALTH CARE EDUCATION/TRAINING PROGRAM

## 2024-06-28 PROCEDURE — 3700000002 HC GENERAL ANESTHESIA TIME - EACH INCREMENTAL 1 MINUTE: Performed by: THORACIC SURGERY (CARDIOTHORACIC VASCULAR SURGERY)

## 2024-06-28 PROCEDURE — 80053 COMPREHEN METABOLIC PANEL: CPT

## 2024-06-28 PROCEDURE — A4312 CATH W/O BAG 2-WAY SILICONE: HCPCS | Performed by: THORACIC SURGERY (CARDIOTHORACIC VASCULAR SURGERY)

## 2024-06-28 PROCEDURE — 99291 CRITICAL CARE FIRST HOUR: CPT | Performed by: INTERNAL MEDICINE

## 2024-06-28 PROCEDURE — 93880 EXTRACRANIAL BILAT STUDY: CPT | Performed by: INTERNAL MEDICINE

## 2024-06-28 PROCEDURE — 85610 PROTHROMBIN TIME: CPT | Performed by: STUDENT IN AN ORGANIZED HEALTH CARE EDUCATION/TRAINING PROGRAM

## 2024-06-28 PROCEDURE — 2500000004 HC RX 250 GENERAL PHARMACY W/ HCPCS (ALT 636 FOR OP/ED): Performed by: THORACIC SURGERY (CARDIOTHORACIC VASCULAR SURGERY)

## 2024-06-28 PROCEDURE — 83735 ASSAY OF MAGNESIUM: CPT

## 2024-06-28 PROCEDURE — 03BB4ZZ EXCISION OF RIGHT RADIAL ARTERY, PERCUTANEOUS ENDOSCOPIC APPROACH: ICD-10-PCS | Performed by: THORACIC SURGERY (CARDIOTHORACIC VASCULAR SURGERY)

## 2024-06-28 PROCEDURE — 83735 ASSAY OF MAGNESIUM: CPT | Performed by: STUDENT IN AN ORGANIZED HEALTH CARE EDUCATION/TRAINING PROGRAM

## 2024-06-28 PROCEDURE — 37799 UNLISTED PX VASCULAR SURGERY: CPT | Performed by: STUDENT IN AN ORGANIZED HEALTH CARE EDUCATION/TRAINING PROGRAM

## 2024-06-28 PROCEDURE — 2020000001 HC ICU ROOM DAILY

## 2024-06-28 PROCEDURE — 85018 HEMOGLOBIN: CPT

## 2024-06-28 PROCEDURE — 85520 HEPARIN ASSAY: CPT

## 2024-06-28 PROCEDURE — 71045 X-RAY EXAM CHEST 1 VIEW: CPT | Performed by: RADIOLOGY

## 2024-06-28 PROCEDURE — 2500000004 HC RX 250 GENERAL PHARMACY W/ HCPCS (ALT 636 FOR OP/ED): Mod: JZ | Performed by: ANESTHESIOLOGIST ASSISTANT

## 2024-06-28 PROCEDURE — 3700000001 HC GENERAL ANESTHESIA TIME - INITIAL BASE CHARGE: Performed by: THORACIC SURGERY (CARDIOTHORACIC VASCULAR SURGERY)

## 2024-06-28 PROCEDURE — 3600000017 HC OR TIME - EACH INCREMENTAL 1 MINUTE - PROCEDURE LEVEL SIX: Performed by: THORACIC SURGERY (CARDIOTHORACIC VASCULAR SURGERY)

## 2024-06-28 PROCEDURE — 2500000005 HC RX 250 GENERAL PHARMACY W/O HCPCS: Performed by: THORACIC SURGERY (CARDIOTHORACIC VASCULAR SURGERY)

## 2024-06-28 PROCEDURE — 85025 COMPLETE CBC W/AUTO DIFF WBC: CPT

## 2024-06-28 PROCEDURE — A4649 SURGICAL SUPPLIES: HCPCS | Performed by: THORACIC SURGERY (CARDIOTHORACIC VASCULAR SURGERY)

## 2024-06-28 PROCEDURE — 85384 FIBRINOGEN ACTIVITY: CPT | Performed by: STUDENT IN AN ORGANIZED HEALTH CARE EDUCATION/TRAINING PROGRAM

## 2024-06-28 PROCEDURE — 02100Z9 BYPASS CORONARY ARTERY, ONE ARTERY FROM LEFT INTERNAL MAMMARY, OPEN APPROACH: ICD-10-PCS | Performed by: THORACIC SURGERY (CARDIOTHORACIC VASCULAR SURGERY)

## 2024-06-28 PROCEDURE — 71045 X-RAY EXAM CHEST 1 VIEW: CPT

## 2024-06-28 PROCEDURE — 82330 ASSAY OF CALCIUM: CPT | Performed by: STUDENT IN AN ORGANIZED HEALTH CARE EDUCATION/TRAINING PROGRAM

## 2024-06-28 PROCEDURE — B24BZZ4 ULTRASONOGRAPHY OF HEART WITH AORTA, TRANSESOPHAGEAL: ICD-10-PCS | Performed by: THORACIC SURGERY (CARDIOTHORACIC VASCULAR SURGERY)

## 2024-06-28 PROCEDURE — 94010 BREATHING CAPACITY TEST: CPT | Performed by: INTERNAL MEDICINE

## 2024-06-28 PROCEDURE — 82947 ASSAY GLUCOSE BLOOD QUANT: CPT

## 2024-06-28 RX ORDER — LIDOCAINE 560 MG/1
1 PATCH PERCUTANEOUS; TOPICAL; TRANSDERMAL EVERY 24 HOURS
Status: DISPENSED | OUTPATIENT
Start: 2024-06-28 | End: 2024-07-02

## 2024-06-28 RX ORDER — PROPOFOL 10 MG/ML
0-50 INJECTION, EMULSION INTRAVENOUS CONTINUOUS
Status: DISCONTINUED | OUTPATIENT
Start: 2024-06-28 | End: 2024-06-29

## 2024-06-28 RX ORDER — PANTOPRAZOLE SODIUM 40 MG/1
40 TABLET, DELAYED RELEASE ORAL
Status: DISCONTINUED | OUTPATIENT
Start: 2024-06-29 | End: 2024-06-28 | Stop reason: SDUPTHER

## 2024-06-28 RX ORDER — CALCIUM CHLORIDE INJECTION 100 MG/ML
INJECTION, SOLUTION INTRAVENOUS AS NEEDED
Status: DISCONTINUED | OUTPATIENT
Start: 2024-06-28 | End: 2024-06-28

## 2024-06-28 RX ORDER — OXYCODONE HYDROCHLORIDE 5 MG/1
10 TABLET ORAL EVERY 4 HOURS PRN
Status: DISCONTINUED | OUTPATIENT
Start: 2024-06-28 | End: 2024-06-30

## 2024-06-28 RX ORDER — PANTOPRAZOLE SODIUM 40 MG/10ML
40 INJECTION, POWDER, LYOPHILIZED, FOR SOLUTION INTRAVENOUS
Status: DISCONTINUED | OUTPATIENT
Start: 2024-06-29 | End: 2024-06-29

## 2024-06-28 RX ORDER — GLYCOPYRROLATE 0.2 MG/ML
0.4 INJECTION INTRAMUSCULAR; INTRAVENOUS ONCE
Status: COMPLETED | OUTPATIENT
Start: 2024-06-28 | End: 2024-06-28

## 2024-06-28 RX ORDER — INDOMETHACIN 25 MG/1
CAPSULE ORAL AS NEEDED
Status: DISCONTINUED | OUTPATIENT
Start: 2024-06-28 | End: 2024-06-28

## 2024-06-28 RX ORDER — PHENYLEPHRINE HYDROCHLORIDE 10 MG/ML
INJECTION INTRAVENOUS AS NEEDED
Status: DISCONTINUED | OUTPATIENT
Start: 2024-06-28 | End: 2024-06-28

## 2024-06-28 RX ORDER — FENTANYL CITRATE 50 UG/ML
INJECTION, SOLUTION INTRAMUSCULAR; INTRAVENOUS AS NEEDED
Status: DISCONTINUED | OUTPATIENT
Start: 2024-06-28 | End: 2024-06-28

## 2024-06-28 RX ORDER — EPINEPHRINE HCL IN 0.9 % NACL 4MG/250ML
PLASTIC BAG, INJECTION (ML) INTRAVENOUS CONTINUOUS PRN
Status: DISCONTINUED | OUTPATIENT
Start: 2024-06-28 | End: 2024-06-28

## 2024-06-28 RX ORDER — DEXTROSE 50 % IN WATER (D50W) INTRAVENOUS SYRINGE
25
Status: DISCONTINUED | OUTPATIENT
Start: 2024-06-28 | End: 2024-06-29

## 2024-06-28 RX ORDER — PAPAVERINE HYDROCHLORIDE 30 MG/ML
INJECTION INTRAMUSCULAR; INTRAVENOUS AS NEEDED
Status: DISCONTINUED | OUTPATIENT
Start: 2024-06-28 | End: 2024-06-28 | Stop reason: HOSPADM

## 2024-06-28 RX ORDER — HEPARIN SODIUM 1000 [USP'U]/ML
INJECTION, SOLUTION INTRAVENOUS; SUBCUTANEOUS AS NEEDED
Status: DISCONTINUED | OUTPATIENT
Start: 2024-06-28 | End: 2024-06-28

## 2024-06-28 RX ORDER — OXYCODONE HYDROCHLORIDE 5 MG/1
5 TABLET ORAL EVERY 4 HOURS PRN
Status: DISCONTINUED | OUTPATIENT
Start: 2024-06-28 | End: 2024-06-30

## 2024-06-28 RX ORDER — ATORVASTATIN CALCIUM 80 MG/1
80 TABLET, FILM COATED ORAL NIGHTLY
Status: DISPENSED | OUTPATIENT
Start: 2024-06-29

## 2024-06-28 RX ORDER — HYDROMORPHONE HYDROCHLORIDE 1 MG/ML
0.2 INJECTION, SOLUTION INTRAMUSCULAR; INTRAVENOUS; SUBCUTANEOUS
Status: DISCONTINUED | OUTPATIENT
Start: 2024-06-28 | End: 2024-06-29

## 2024-06-28 RX ORDER — CEFAZOLIN SODIUM 2 G/100ML
2 INJECTION, SOLUTION INTRAVENOUS EVERY 8 HOURS
Status: COMPLETED | OUTPATIENT
Start: 2024-06-29 | End: 2024-06-30

## 2024-06-28 RX ORDER — NEOSTIGMINE METHYLSULFATE 1 MG/ML
4 INJECTION, SOLUTION INTRAVENOUS ONCE
Status: COMPLETED | OUTPATIENT
Start: 2024-06-28 | End: 2024-06-28

## 2024-06-28 RX ORDER — VANCOMYCIN HYDROCHLORIDE 1 G/20ML
INJECTION, POWDER, LYOPHILIZED, FOR SOLUTION INTRAVENOUS DAILY PRN
Status: DISCONTINUED | OUTPATIENT
Start: 2024-06-28 | End: 2024-06-29

## 2024-06-28 RX ORDER — PANTOPRAZOLE SODIUM 40 MG/10ML
40 INJECTION, POWDER, LYOPHILIZED, FOR SOLUTION INTRAVENOUS
Status: DISCONTINUED | OUTPATIENT
Start: 2024-06-29 | End: 2024-06-28 | Stop reason: SDUPTHER

## 2024-06-28 RX ORDER — ESMOLOL HYDROCHLORIDE 10 MG/ML
INJECTION INTRAVENOUS AS NEEDED
Status: DISCONTINUED | OUTPATIENT
Start: 2024-06-28 | End: 2024-06-28

## 2024-06-28 RX ORDER — NAPROXEN SODIUM 220 MG/1
81 TABLET, FILM COATED ORAL DAILY
Status: DISPENSED | OUTPATIENT
Start: 2024-06-29

## 2024-06-28 RX ORDER — PANTOPRAZOLE SODIUM 40 MG/1
40 TABLET, DELAYED RELEASE ORAL
Status: DISCONTINUED | OUTPATIENT
Start: 2024-06-29 | End: 2024-06-29

## 2024-06-28 RX ORDER — PROPOFOL 10 MG/ML
INJECTION, EMULSION INTRAVENOUS AS NEEDED
Status: DISCONTINUED | OUTPATIENT
Start: 2024-06-28 | End: 2024-06-28

## 2024-06-28 RX ORDER — MAGNESIUM SULFATE 1 G/100ML
INJECTION INTRAVENOUS AS NEEDED
Status: DISCONTINUED | OUTPATIENT
Start: 2024-06-28 | End: 2024-06-28

## 2024-06-28 RX ORDER — VANCOMYCIN HYDROCHLORIDE 1 G/20ML
INJECTION, POWDER, LYOPHILIZED, FOR SOLUTION INTRAVENOUS AS NEEDED
Status: DISCONTINUED | OUTPATIENT
Start: 2024-06-28 | End: 2024-06-28

## 2024-06-28 RX ORDER — NEOSTIGMINE METHYLSULFATE 1 MG/ML
INJECTION, SOLUTION INTRAVENOUS
Status: COMPLETED
Start: 2024-06-28 | End: 2024-06-28

## 2024-06-28 RX ORDER — NOREPINEPHRINE BITARTRATE 0.03 MG/ML
INJECTION, SOLUTION INTRAVENOUS CONTINUOUS PRN
Status: DISCONTINUED | OUTPATIENT
Start: 2024-06-28 | End: 2024-06-28

## 2024-06-28 RX ORDER — ETOMIDATE 2 MG/ML
INJECTION INTRAVENOUS AS NEEDED
Status: DISCONTINUED | OUTPATIENT
Start: 2024-06-28 | End: 2024-06-28

## 2024-06-28 RX ORDER — MIDAZOLAM HYDROCHLORIDE 1 MG/ML
INJECTION INTRAMUSCULAR; INTRAVENOUS AS NEEDED
Status: DISCONTINUED | OUTPATIENT
Start: 2024-06-28 | End: 2024-06-28

## 2024-06-28 RX ORDER — ACETAMINOPHEN 325 MG/1
650 TABLET ORAL EVERY 6 HOURS
Status: DISPENSED | OUTPATIENT
Start: 2024-06-28

## 2024-06-28 RX ORDER — SODIUM CHLORIDE, SODIUM LACTATE, POTASSIUM CHLORIDE, CALCIUM CHLORIDE 600; 310; 30; 20 MG/100ML; MG/100ML; MG/100ML; MG/100ML
30 INJECTION, SOLUTION INTRAVENOUS CONTINUOUS
Status: DISCONTINUED | OUTPATIENT
Start: 2024-06-28 | End: 2024-06-30

## 2024-06-28 RX ORDER — LIDOCAINE HYDROCHLORIDE 20 MG/ML
INJECTION, SOLUTION INFILTRATION; PERINEURAL AS NEEDED
Status: DISCONTINUED | OUTPATIENT
Start: 2024-06-28 | End: 2024-06-28

## 2024-06-28 RX ORDER — PROTAMINE SULFATE 10 MG/ML
INJECTION, SOLUTION INTRAVENOUS AS NEEDED
Status: DISCONTINUED | OUTPATIENT
Start: 2024-06-28 | End: 2024-06-28

## 2024-06-28 RX ORDER — NITROGLYCERIN 20 MG/100ML
0-200 INJECTION INTRAVENOUS CONTINUOUS
Status: DISCONTINUED | OUTPATIENT
Start: 2024-06-28 | End: 2024-06-29

## 2024-06-28 RX ORDER — GLYCOPYRROLATE 0.2 MG/ML
INJECTION INTRAMUSCULAR; INTRAVENOUS
Status: COMPLETED
Start: 2024-06-28 | End: 2024-06-28

## 2024-06-28 RX ORDER — SODIUM CHLORIDE, SODIUM LACTATE, POTASSIUM CHLORIDE, CALCIUM CHLORIDE 600; 310; 30; 20 MG/100ML; MG/100ML; MG/100ML; MG/100ML
5 INJECTION, SOLUTION INTRAVENOUS CONTINUOUS
Status: DISCONTINUED | OUTPATIENT
Start: 2024-06-28 | End: 2024-06-30

## 2024-06-28 RX ORDER — ALBUMIN HUMAN 50 G/1000ML
SOLUTION INTRAVENOUS AS NEEDED
Status: DISCONTINUED | OUTPATIENT
Start: 2024-06-28 | End: 2024-06-28

## 2024-06-28 RX ORDER — PHENYLEPHRINE 10 MG/250 ML(40 MCG/ML)IN 0.9 % SOD.CHLORIDE INTRAVENOUS
CONTINUOUS PRN
Status: DISCONTINUED | OUTPATIENT
Start: 2024-06-28 | End: 2024-06-28

## 2024-06-28 RX ORDER — INSULIN LISPRO 100 [IU]/ML
0-15 INJECTION, SOLUTION INTRAVENOUS; SUBCUTANEOUS EVERY 4 HOURS
Status: DISCONTINUED | OUTPATIENT
Start: 2024-06-28 | End: 2024-06-29

## 2024-06-28 RX ORDER — MAGNESIUM SULFATE HEPTAHYDRATE 40 MG/ML
4 INJECTION, SOLUTION INTRAVENOUS EVERY 6 HOURS PRN
Status: DISCONTINUED | OUTPATIENT
Start: 2024-06-28 | End: 2024-06-30

## 2024-06-28 RX ORDER — EPINEPHRINE HCL IN DEXTROSE 5% 4MG/250ML
0-2 PLASTIC BAG, INJECTION (ML) INTRAVENOUS CONTINUOUS
Status: DISCONTINUED | OUTPATIENT
Start: 2024-06-28 | End: 2024-06-29

## 2024-06-28 RX ORDER — SODIUM CHLORIDE 0.9 G/100ML
IRRIGANT IRRIGATION AS NEEDED
Status: DISCONTINUED | OUTPATIENT
Start: 2024-06-28 | End: 2024-06-28 | Stop reason: HOSPADM

## 2024-06-28 RX ORDER — ROCURONIUM BROMIDE 10 MG/ML
INJECTION, SOLUTION INTRAVENOUS AS NEEDED
Status: DISCONTINUED | OUTPATIENT
Start: 2024-06-28 | End: 2024-06-28

## 2024-06-28 RX ORDER — NALOXONE HYDROCHLORIDE 0.4 MG/ML
0.2 INJECTION, SOLUTION INTRAMUSCULAR; INTRAVENOUS; SUBCUTANEOUS EVERY 5 MIN PRN
Status: ACTIVE | OUTPATIENT
Start: 2024-06-28

## 2024-06-28 RX ORDER — CALCIUM GLUCONATE 20 MG/ML
2 INJECTION, SOLUTION INTRAVENOUS EVERY 6 HOURS PRN
Status: DISCONTINUED | OUTPATIENT
Start: 2024-06-28 | End: 2024-06-30

## 2024-06-28 RX ORDER — AMOXICILLIN 250 MG
2 CAPSULE ORAL 2 TIMES DAILY
Status: DISPENSED | OUTPATIENT
Start: 2024-06-28

## 2024-06-28 RX ORDER — NITROGLYCERIN 20 MG/100ML
INJECTION INTRAVENOUS CONTINUOUS PRN
Status: DISCONTINUED | OUTPATIENT
Start: 2024-06-28 | End: 2024-06-28

## 2024-06-28 RX ORDER — MAGNESIUM SULFATE HEPTAHYDRATE 40 MG/ML
2 INJECTION, SOLUTION INTRAVENOUS EVERY 6 HOURS PRN
Status: DISCONTINUED | OUTPATIENT
Start: 2024-06-28 | End: 2024-06-30

## 2024-06-28 RX ORDER — CALCIUM GLUCONATE 20 MG/ML
1 INJECTION, SOLUTION INTRAVENOUS EVERY 6 HOURS PRN
Status: DISCONTINUED | OUTPATIENT
Start: 2024-06-28 | End: 2024-06-30

## 2024-06-28 RX ORDER — POLYETHYLENE GLYCOL 3350 17 G/17G
17 POWDER, FOR SOLUTION ORAL DAILY
Status: DISCONTINUED | OUTPATIENT
Start: 2024-06-29 | End: 2024-06-29

## 2024-06-28 RX ORDER — HEPARIN SODIUM 1000 [USP'U]/ML
INJECTION, SOLUTION INTRAVENOUS; SUBCUTANEOUS AS NEEDED
Status: DISCONTINUED | OUTPATIENT
Start: 2024-06-28 | End: 2024-06-28 | Stop reason: HOSPADM

## 2024-06-28 RX ORDER — PROPOFOL 10 MG/ML
INJECTION, EMULSION INTRAVENOUS CONTINUOUS PRN
Status: DISCONTINUED | OUTPATIENT
Start: 2024-06-28 | End: 2024-06-28

## 2024-06-28 RX ORDER — CEFAZOLIN 1 G/1
INJECTION, POWDER, FOR SOLUTION INTRAVENOUS AS NEEDED
Status: DISCONTINUED | OUTPATIENT
Start: 2024-06-28 | End: 2024-06-28

## 2024-06-28 ASSESSMENT — PAIN SCALES - GENERAL
PAINLEVEL_OUTOF10: 0 - NO PAIN

## 2024-06-28 ASSESSMENT — PAIN - FUNCTIONAL ASSESSMENT
PAIN_FUNCTIONAL_ASSESSMENT: 0-10

## 2024-06-28 NOTE — PROGRESS NOTES
"Felice Almonte is a 69 y.o. male on day 2 of admission presenting with Coronary artery disease involving native coronary artery of native heart with unstable angina pectoris (Multi).    Subjective   No acute events overnight. Pt states he feels well and is \"ready to go back to work.\" Family at bedside, ready to proceed with surgery.    Patient denies any fever, chills, lightheadedness, shortness of breath, chest pain, abdominal pain, N/V/C/D, lower extremity pain/swelling.    10 point ROS performed and negative unless stated above.          Objective   Weight: 64.7 kg (142 lb 9.6 oz) (06/26/24 1629)    Daily Weight  06/28/24 : 67.8 kg (149 lb 7.6 oz)      Last Recorded Vitals  Heart Rate:  [58-86]   Temp:  [35.6 °C (96.1 °F)-36.7 °C (98.1 °F)]   Resp:  [15-23]   BP: ()/(53-90)   Weight:  [67.8 kg (149 lb 7.6 oz)]   SpO2:  [85 %-99 %]          Intake/Output last 3 Shifts:  I/O last 3 completed shifts:  In: 348.4 (5.1 mL/kg) [I.V.:348.4 (5.1 mL/kg)]  Out: 2126 (31.4 mL/kg) [Urine:2126 (0.9 mL/kg/hr)]  Weight: 67.8 kg       Relevant Results  Results from last 7 days   Lab Units 06/28/24  0355 06/27/24  0134 06/26/24  1648   WBC AUTO x10*3/uL 7.4 7.9 7.0  6.8   HEMOGLOBIN g/dL 13.3* 13.5 14.6  14.6   HEMATOCRIT % 38.6* 37.4* 44.6  43.9   PLATELETS AUTO x10*3/uL 143* 163 154  172     Results from last 7 days   Lab Units 06/28/24  0355 06/27/24  0134 06/26/24  1648   SODIUM mmol/L 138 140  139 137   POTASSIUM mmol/L 4.6 4.2  4.1 4.8   CO2 mmol/L 26 21  25 24   ANION GAP mmol/L 14 19  14 15   BUN mg/dL 22 27*  27* 22   CREATININE mg/dL 0.99 1.02  0.98 0.87   GLUCOSE mg/dL 111* 86  85 70*   EGFR mL/min/1.73m*2 82 79  83 >90   MAGNESIUM mg/dL 2.09 2.22 2.48*   PHOSPHORUS mg/dL  --  3.8 3.5      Results from last 7 days   Lab Units 06/28/24  0355 06/27/24  1241 06/27/24  0134   ALT U/L 12 9* 10  12   AST U/L 14 14 14  15   ALK PHOS U/L 68 66 66      Results from last 7 days   Lab Units 06/27/24  0134 " 06/26/24  1648   INR  1.0 1.0     Results from last 7 days   Lab Units 06/28/24  1030 06/26/24  1648   POCT PH, ARTERIAL pH 7.40  --    POCT PO2, ARTERIAL mm Hg 79*  --    POCT PCO2, ARTERIAL mm Hg 40  --    FIO2 % 21 21     Results from last 7 days   Lab Units 06/26/24  1648   POCT PH, VENOUS pH 7.38   POCT PCO2, VENOUS mm Hg 41     Results from last 7 days   Lab Units 06/27/24  1241 06/26/24  1648   FREE T4 ng/dL 1.03 1.39             Physical Exam  General: well-developed, well-nourished, no acute distress, AAOx3  HEENT: EOM intact, PERRL, no JVD  CV: regular rate and rhythm, normal S1/S2, no murmur, gallop, or rub, prominent PMI  Pulm: normal respiratory effort, clear to auscultation bilaterally with no wheezes, rales, rhonchi  Abd: soft, nontender, nondistended, no masses, normoactive bowel sounds  Extremities: warm, no LE edema  Neuro: moves all extremities equally, CN II - XII grossly intact  Psych: normal mood and affect  Skin: warm, dry, intact    Medications    aspirin, 81 mg, oral, Daily  insulin lispro, 0-5 Units, subcutaneous, TID  metoprolol tartrate, 25 mg, oral, BID  mupirocin, , Topical, BID  rosuvastatin, 40 mg, oral, Nightly        heparin, 0-4,000 Units/hr, Last Rate: 1,000 Units/hr (06/28/24 1200)        PRN medications: dextrose, dextrose, glucagon, glucagon            Assessment/Plan   Principal Problem:    Coronary artery disease involving native coronary artery of native heart with unstable angina pectoris (Multi)  Active Problems:    Prediabetes    Myocardiopathy (Multi)    Mr. Felice Almonte is a 68 yo M with PMHx of DMT2, HTN, who presents as a transfer from [facility] following [facility] which demonstrated critical LM disease. Will start pt on hep gtt, and initiate nitroglycerin gtt as needed for chest pain. Will plan for cardiac surgery on 6/28.     Updates 6/28  -Plan for OR 6/28 for MIDCAB    Neuro  No active issues     Cardiac  #CAD with critical LM disease seen on [facility] 6/26  -Loaded with ASA  325, continue 81mg daily  -Hep gtt  -TTE 6/27/24:              1. Basal and mid anterior wall and basal and mid anterior septum are abnormal.   2. Left ventricular ejection fraction is moderately decreased, by visual estimate at 40%.              3. There is global hypokinesis of the left ventricle with minor regional variations.   4. Spectral Doppler shows an impaired relaxation pattern of left ventricular diastolic filling.              5. There is normal right ventricular global systolic function.  -Rosuvastatin 40mg daily  -Metoprolol tartrate 25 mg BID  -nitroglycerin gtt as needed for chest pain  -Cardiac surgery consult, planned for OR 6/28     Pulm  No active issues     Endo  #Prediabetes  -A1c 5.7% on admission  -Carb controlled diet     GI  No active issues     Renal  No active issues     ID  No active issues     MSK/Rheum  No active issues        F: bolus PRN  E: replete PRN  N: Cardiac/diabetic  A: PIV    DVT PPx: SCDs, hep gtt  GI PPx: N/A     CODE STATUS: Full code (confirmed on admission)  SURROGATE DECISION MAKER: Ariadna Saldana (niece) [phone]

## 2024-06-28 NOTE — H&P
CTICU History & Physical    Subjective   HPI:  69 year old male with PMH of NIDDM, HTN presented as transfer from [facility] 6/26 for critical LM disease. He presented for outpatient [facility] with the complaint of chest pain, on and off, for about 10 months which has worsened. He presents to CTICU s/p    Cardiac Testing:     TTE:  6/27/24  PHYSICIAN INTERPRETATION:  Left Ventricle: Left ventricular ejection fraction is moderately decreased, by visual estimate at 40%. There is global hypokinesis of the left ventricle with minor regional variations. The left ventricular cavity size is normal. Spectral Doppler shows an impaired relaxation pattern of left ventricular diastolic filling.  LV Wall Scoring:  The basal and mid anterior wall and basal and mid anterior septum are  hypokinetic.     Left Atrium: The left atrium is normal in size.  Right Ventricle: The right ventricle is normal in size. There is normal right ventricular global systolic function.  Right Atrium: The right atrium is normal in size.  Aortic Valve: The aortic valve is probably trileaflet. There is no evidence of aortic valve regurgitation. The peak instantaneous gradient of the aortic valve is 3.9 mmHg.  Mitral Valve: The mitral valve is normal in structure. There is trace mitral valve regurgitation.  Tricuspid Valve: The tricuspid valve is structurally normal. There is trace tricuspid regurgitation.  Pulmonic Valve: The pulmonic valve is not well visualized. There is physiologic pulmonic valve regurgitation.  Pericardium: There is a trivial pericardial effusion.  Aorta: The aortic root is normal.  Systemic Veins: The inferior vena cava appears to be of normal size. There is IVC inspiratory collapse greater than 50%.  In comparison to the previous echocardiogram(s): There are no prior studies on this patient for comparison purposes.        CONCLUSIONS:   1. Basal and mid anterior wall and basal and mid anterior septum are abnormal.   2. Left ventricular  ejection fraction is moderately decreased, by visual estimate at 40%.   3. There is global hypokinesis of the left ventricle with minor regional variations.   4. Spectral Doppler shows an impaired relaxation pattern of left ventricular diastolic filling.   5. There is normal right ventricular global systolic function.       [facility]:  6/26/24  Coronary Angiography:  The coronary circulation is right dominant.     Left Main Coronary Artery:  The left main coronary artery is a medium-sized caliber vessel. The left main arises normally from the left coronary sinus of Valsalva. The left main coronary artery showed severe ostial to proximal atherosclerotic disease. The ostial and proximal left main coronary artery showed 95% stenosis.  This lesion was focal and moderately calcified.     Left Anterior Descending Coronary Artery Distribution:  The left anterior descending coronary artery is a large caliber vessel. The LAD arises normally from the left main coronary artery. The LAD demonstrated diffuse moderate atherosclerotic disease.     Circumflex Coronary Artery Distribution:  The circumflex coronary artery is a large caliber vessel. The circumflex arises normally from the left main coronary artery. The circumflex revealed no significant disease or stenosis greater than 30%.     Right Coronary Artery Distribution:     The right coronary artery is a large caliber vessel. The RCA arises normally from the right sinus of Valsalva. The RCA showed no significant disease or stenosis greater than 30% and mild proximal atherosclerotic disease.     Coronary Lesion Summary:  Vessel      Stenosis     Vessel Segment  Left Main 95% stenosis ostial and proximal        Subclavian Artery Findings:     Left Subclavian Artery:  The left subclavian artery is a large caliber vessel. The left subclavian artery revealed no evidence of significant disease. Large left subclavian artery gives origin to large LIMA, a suitable graft conduit.      Procedure/Surgeon: ***  Frontliner/Anesthesia: ***  Out of OR Time (document on ventilator card): ***     OR Course/Issues: ***     CPB time: ***  Cross clamp time: ***  Circ arrest time: ***  Echo Pre/Post: ***  Chest Tubes/Drains: ***   Temporary wires location/setting: ***      Fluids  Crystalloid: ***  Colloid: ***  Cellsaver: ***  Products: ***  EBL: ***  UOP: ***     Anesthesia  Intubation: ***  Intravenous Access: ***   AICD: ***  PPM: ***  Regional anesthesia: ***  Benzodiazepine dose/last administration: ***mg midazolam total  Opioid dose/last administration: ***mcg fentanyl total  NMB dose/last administration: ***mg rocuronium total  TOF/ reversal given: ***  Antibiotic time: ***  Temperature on admission to ICU: ***    Past Medical History:   Diagnosis Date    Hyperlipidemia      Past Surgical History:   Procedure Laterality Date    CARDIAC CATHETERIZATION N/A 6/26/2024    Procedure: Left Heart Cath with Coronary Angiography and LV;  Surgeon: Dagoberto Washburn MD;  Location: [facility] Cardiac Cath Lab;  Service: Cardiovascular;  Laterality: N/A;  6/26/24--12:30 pm for [facility] [address]--angina I20.9 and CMO I42.9  [facility]  ([facility] no PA required     Medications Prior to Admission   Medication Sig Dispense Refill Last Dose    aspirin 81 mg EC tablet Take 1 tablet (81 mg) by mouth once daily.   6/26/2024    metoprolol succinate XL (Toprol-XL) 25 mg 24 hr tablet Take 1 tablet (25 mg) by mouth once daily. Do not crush or chew.   6/26/2024    nitroglycerin (Nitrostat) 0.4 mg SL tablet Place 1 tablet (0.4 mg) under the tongue every 5 minutes if needed for chest pain.   Unknown    rosuvastatin (Crestor) 10 mg tablet Take 1 tablet (10 mg) by mouth once daily.   Unknown     Patient has no known allergies.  Social History     Tobacco Use    Smoking status: Former     Types: Cigarettes   Substance Use Topics    Alcohol use: Never     No family history on file.    Review of Systems:  BRANDI, intubated and  sedated    Objective   Vitals:  Most Recent:  Vitals:    06/28/24 1300   BP: 134/53   Pulse: 73   Resp: 23   Temp:    SpO2: 94%       24hr Min/Max:  Temp  Min: 35.6 °C (96.1 °F)  Max: 36.7 °C (98.1 °F)  Pulse  Min: 58  Max: 86  BP  Min: 96/62  Max: 134/53  Resp  Min: 15  Max: 23  SpO2  Min: 85 %  Max: 99 %    I/O:  I/O last 2 completed shifts:  In: 250 (3.7 mL/kg) [I.V.:250 (3.7 mL/kg)]  Out: 1101 (16.2 mL/kg) [Urine:1101 (0.7 mL/kg/hr)]  Weight: 67.8 kg     LDA:       Physical Exam:   - CONSTITUTION: ***  - NEUROLOGIC: ***  - CARDIOVASCULAR: ***  - RESPIRATORY: ***  - GI: ***  - : ***  - EXTREMITIES: ***  - SKIN: ***  - PSYCHIATRIC: ***    Lab Review:  Results from last 7 days   Lab Units 06/28/24  0355   WBC AUTO x10*3/uL 7.4   HEMOGLOBIN g/dL 13.3*   HEMATOCRIT % 38.6*   PLATELETS AUTO x10*3/uL 143*     Results from last 7 days   Lab Units 06/28/24  0355   SODIUM mmol/L 138   POTASSIUM mmol/L 4.6   CHLORIDE mmol/L 103   CO2 mmol/L 26   BUN mg/dL 22   CREATININE mg/dL 0.99   CALCIUM mg/dL 9.0   PROTEIN TOTAL g/dL 6.9   BILIRUBIN TOTAL mg/dL 0.4   ALK PHOS U/L 68   ALT U/L 12   AST U/L 14   GLUCOSE mg/dL 111*     Results from last 7 days   Lab Units 06/28/24  0355   MAGNESIUM mg/dL 2.09     Results from last 7 days   Lab Units 06/28/24  1030   POCT PH, ARTERIAL pH 7.40   POCT PCO2, ARTERIAL mm Hg 40   POCT PO2, ARTERIAL mm Hg 79*   POCT HCO3 CALCULATED, ARTERIAL mmol/L 24.8   POCT BASE EXCESS, ARTERIAL mmol/L 0.0       Most recent labs and imaging reviewed.    Daily Risk Screen  Intubated: Wean to extubate  Central line: Indicated for hemodynamic monitoring  Rhoades: Indicated for accurate intake and output    Assessment/Plan     Assessment:     Plan:  NEURO:  PMH of… Patient is intubated and sedated on propofol infusion. Acute post operative pain.   -->  - Serial neuro and pain assessments   - Continue propofol until NMB reversal, then daily sedation vacation at minimum   - NMB reversal when normothermic and  hemodynamically stable.    - Scheduled Tylenol   - PRN oxycodone  - PRN dilaudid for pain   - PT Consult, OOB to chair as tolerated, chair position if not tolerated   - CAM ICU score qshift  - Sleep/wake cycle hygiene     CV:  Patient has a history of HTN, and new severe LM disease. Is now status post *** Pre/Post EF: *** Arrived to CTICU on *** A/V epicardial wires set *** @ ***.-->  - Maintain goal MAP ***  - Mixed venous and CI Q4H  - Volume resuscitate as clinically indicated  - Maintain epicardial wires set *** @ ***  - If CABG is 2/2 STEMI/unstable angina, will receive Plavix POD2  - Start statin  - Hold home metoprolol     PULM:  No history of pulmonary disease.  Currently intubated on ventilator. Chest tubes ***.-->  - F/u post op CXR  - Once reversed, wean ventilator settings towards CPAP & extubation   - Wean FiO2 maintaining SpO2 >92%.   - IS q1h and OOB to chair when extubated  - Chest tubes to wall suction.    GI:  No hx of GERD. OG in place.-->  - Continue PPI until extubated  - NPO, will perform bedside swallow eval post extubation   - Colace/senna BID and miralax BID     : No history of renal disease, baseline creatinine 0.87. Creatinine stable post-op. Velázquez in place and making adequate UOP. -->  - Continue velázquez catheter for strict I/Os.  - Goal UOP 0.5ml/kg/hr  - RFP as clinically indicated  - Replete electrolytes per CTICU protocol     ENDO:  PMH of prediabetes. A1c: 5.7. -->  - Maintain BG <180, insulin per CTICU protocol     HEME:  Acute blood loss anemia and thrombocytopenia.-->    - Monitor drain output volume and characteristics  - CBC, coags, and fibrinogen post op and as clinically indicated  - Start ASA 6hrs post-op for CABG  - If CABG is 2/2 STEMI/unstable angina, will receive Plavix POD2  - SQH tomorrow   - SCDs for DVT prophylaxis.  - Last type and screen: 6/27     ID:  Afebrile, no current indications of infection. MRSA pending.-->  - Trend temp q4h  - Periop cefazolin x 48hrs      Skin:  No active skin issues.  - preventative Mepilex dressings in place on sacrum and heels  - change preventative Mepilex weekly or more frequently as indicated (when moist/soiled)   - every shift skin assessment per nursing and weekly ICU skin rounds  - moisture barrier to be applied with dorota care  - active skin problems addressed with nursing on daily rounds     Proph:  SCDs  PPI     G:  Line  Right IJ MAC w Minimac placed ***  Left brachial a-line placed ***    F: Family: will update at bedside postoperatively.     A,B,C,D,E,F,G: reviewed    I personally spent *** minutes of critical care time directly and personally managing the patient exclusive of separately billable procedures.     Dispo: CTICU care for now.    CTICU TEAM PHONE [phone]

## 2024-06-28 NOTE — ANESTHESIA PROCEDURE NOTES
Arterial Line:    Date/Time: 6/28/2024 4:01 PM    Staffing  Performed: attending   Authorized by: Saul Saldana MD    Performed by: Lama rIina MD    An arterial line was placed. Procedure performed using ultrasound guidance.in the OR for the following indication(s): continuous blood pressure monitoring and blood sampling needed.    A 20 gauge (size), 1 and 3/4 inch (length), Angiocath (type) catheter was placed into the Right brachial artery, secured by Tegaderm,   Seldinger technique used.  Events:  patient tolerated procedure well with no complications.

## 2024-06-28 NOTE — ANESTHESIA PROCEDURE NOTES
Peripheral IV  Date/Time: 6/28/2024 4:13 PM      Placement  Needle size: 16 G  Laterality: right  Location: hand  Local anesthetic: none  Site prep: alcohol  Technique: anatomical landmarks  Attempts: 1

## 2024-06-28 NOTE — PROGRESS NOTES
1/1 CICU     TRANSFER  F:  [facility]     ADMITTING DX  critical LM disease found after abnormal stress test and cardiac cath.     CT SURGERY  06/28 Midcab with Paul Shoemaker DC PLAN  TBD - Care Transitions is following to develop a safe & supportive discharge plan in collaboration with multidisciplinary team (&) patient/family/significant others.       PAYOR   [facility]      PT/OT   At time of this note, no orders     COMPLETED  (X) 06/27 - New to author/unit. EPIC reviewed & address, PCP - none, pharmacy verified  (X) 06/27 - DC/SDOH Assessments with sister @ bedside (Maricruz)      NOTE FROM 6/27  Family states that patient lives in two family home with niece (Ariadna), her spouse, and family in other 2nd half of home with many families nearby in [place]. Patient never  and has worked physical jobs for all his life.  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  Sister Maricruz had this author update contacts and had another sister Maci Saldana.  Patient with many family members and support. Maricruz conveyed that important to know that brother (1y younger) is at home with hospice and believe little time left. Brother is niece Ariadna's father and he is at her house with hospice. Maricruz asked that to keep Maricruz on contacts but to contact Maricruz or Maci.  Due to this information patient is very c/f not getting home from hospital to be with brother as he passes. Also patient [place] and has hearing aids.  Per sister Maricruz, patient lifelong bachelor and somewhat quiet when around strangers. No PCP because according to Maricruz, no medical needed prior and only sees an ear doctor for hearing loss. No PCP ever and no meds prior to admit.      Reema Abbott (LSW, MSW)

## 2024-06-28 NOTE — ANESTHESIA PROCEDURE NOTES
Airway  Date/Time: 6/28/2024 4:15 PM  Urgency: elective    Airway not difficult    Staffing  Performed: resident   Authorized by: Saul Saldana MD    Performed by: Lama Irina MD  Patient location during procedure: OR    Indications and Patient Condition  Indications for airway management: anesthesia  Spontaneous ventilation: present  Sedation level: deep  Preoxygenated: yes  Patient position: sniffing  Mask difficulty assessment: 1 - vent by mask  Planned trial extubation    Final Airway Details  Final airway type: endotracheal airway      Successful airway: ETT - double lumen left  Cuffed: yes   Successful intubation technique: direct laryngoscopy  Facilitating devices/methods: intubating stylet  Endotracheal tube insertion site: oral  Blade size: #4  ETT DL size (fr): 39  Cormack-Lehane Classification: grade IIa - partial view of glottis  Placement verified by: chest auscultation and capnometry   Measured from: teeth  Number of attempts at approach: 1

## 2024-06-28 NOTE — ANESTHESIA PREPROCEDURE EVALUATION
Patient: Felice Almonte    Procedure Information       Anesthesia Start Date/Time: 06/28/24 1547    Procedure: MIDCAB x 2 LIMA and radial    Location: [facility] / [facility]    Surgeons: Jose De Jesus Shoemaker MD            Relevant Problems   Cardiac   (+) Angina pectoris (CMS-HCC)   (+) Coronary artery disease involving native coronary artery of native heart with unstable angina pectoris (Multi)       Clinical information reviewed:    Allergies  Meds               NPO Detail:  No data recorded     Physical Exam    Airway  Mallampati: II     Cardiovascular   Rhythm: regular  Rate: normal     Dental   (+) upper dentures     Pulmonary   Breath sounds clear to auscultation     Abdominal        Anesthesia Plan    History of general anesthesia?: yes  History of complications of general anesthesia?: no    ASA 4     general   (Plan GA by DLETT, art line, cvp, JACK)  intravenous induction   Postoperative administration of opioids is intended.    Plan discussed with CAA and resident.

## 2024-06-28 NOTE — PROGRESS NOTES
Pharmacy Medication History Review    Felice Almonte is a 69 y.o. male admitted for Coronary artery disease involving native coronary artery of native heart with unstable angina pectoris (Multi). Pharmacy reviewed the patient's [id number] medications and allergies for accuracy.    The list below reflects the updated PTA list. Comments regarding how patient may be taking medications differently can be found in the Admit Orders Activity  Prior to Admission Medications   Prescriptions Last Dose Informant Patient Reported?   aspirin 81 mg EC tablet 6/26/2024 Self, Family Member Yes   Sig: Take 1 tablet (81 mg) by mouth once daily.   metoprolol succinate XL (Toprol-XL) 25 mg 24 hr tablet 6/26/2024 Self, Family Member Yes   Sig: Take 1 tablet (25 mg) by mouth once daily. Do not crush or chew.   nitroglycerin (Nitrostat) 0.4 mg SL tablet Unknown Self, Family Member Yes   Sig: Place 1 tablet (0.4 mg) under the tongue every 5 minutes if needed for chest pain.   rosuvastatin (Crestor) 10 mg tablet Unknown Self, Family Member Yes   Sig: Take 1 tablet (10 mg) by mouth once daily.      Facility-Administered Medications: None        The list below reflects the updated allergy list. Please review each documented allergy for additional clarification and justification.  Allergies  Reviewed by Bj Nevarez RN on 6/26/2024   No Known Allergies         Patient declines M2B at discharge. Pharmacy has been updated to [facility].    Sources used to complete the med history include: Patient/Family interview - fair historian of medications/ Pharmacy - [facility]/ Chart review    Alana Guillen PharmD  Transitions of Care Pharmacist  [facility]  Please reach out via Secure Chat for questions, or if no response call [employer] or [facility]

## 2024-06-28 NOTE — ANESTHESIA PROCEDURE NOTES
Central Venous Line:    Date/Time: 6/28/2024 4:33 PM    A central venous line was placed in the OR for the following indication(s): central venous access and CVP monitoring.  Staffing  Performed: resident   Authorized by: Saul Saldana MD    Performed by: Lama Irina MD    Sterility preparation included the following: provider hand hygiene performed prior to central venous catheter insertion, all 5 sterile barriers used (gloves, gown, cap, mask, large sterile drape) during central venous catheter insertion, antiseptic used during central venous catheter insertion and skin prep agent completely dried prior to procedure.  Medical reason for not performing maximal sterile barrier technique: no  The patient was placed in Trendelenburg position.    Right internal jugular vein was prepped.    The site was prepped with Chlorhexidine.  Size: 9 Fr (8 Fr)   Length: 11.5  Catheter type: introducer   Number of Lumens: double lumen    This catheter was not an oximetric catheter.    During the procedure, the following specific steps were taken: target vein identified, needle advanced into vein and blood aspirated and guidewire advanced into vein.  Seldinger technique used.  Procedure performed using ultrasound guidance.  Sterile gel and probe cover used in ultrasound-guided central venous catheter insertion.    Intravenous verification was obtained by ultrasound, venous blood return and manometry.      Post insertion care included: all ports aspirated, all ports flushed easily, guidewire removed intact, Biopatch applied, line sutured in place and dressing applied.    During the procedure the patient experienced: patient tolerated procedure well with no complications.           images stored in chart

## 2024-06-29 ENCOUNTER — APPOINTMENT (OUTPATIENT)
Dept: RADIOLOGY | Facility: HOSPITAL | Age: 69
DRG: 235 | End: 2024-06-29
Payer: COMMERCIAL

## 2024-06-29 LAB
ALBUMIN SERPL BCP-MCNC: 3.7 G/DL (ref 3.4–5)
ALBUMIN SERPL BCP-MCNC: 4 G/DL (ref 3.4–5)
ALBUMIN SERPL BCP-MCNC: 4 G/DL (ref 3.4–5)
ALP SERPL-CCNC: 37 U/L (ref 33–136)
ALT SERPL W P-5'-P-CCNC: 14 U/L (ref 10–52)
ANION GAP BLDA CALCULATED.4IONS-SCNC: 10 MMO/L (ref 10–25)
ANION GAP BLDA CALCULATED.4IONS-SCNC: 12 MMO/L (ref 10–25)
ANION GAP BLDA CALCULATED.4IONS-SCNC: 14 MMO/L (ref 10–25)
ANION GAP BLDA CALCULATED.4IONS-SCNC: 15 MMO/L (ref 10–25)
ANION GAP BLDA CALCULATED.4IONS-SCNC: 16 MMO/L (ref 10–25)
ANION GAP BLDA CALCULATED.4IONS-SCNC: 17 MMO/L (ref 10–25)
ANION GAP BLDA CALCULATED.4IONS-SCNC: 17 MMO/L (ref 10–25)
ANION GAP BLDA CALCULATED.4IONS-SCNC: 7 MMO/L (ref 10–25)
ANION GAP BLDV CALCULATED.4IONS-SCNC: 14 MMOL/L (ref 10–25)
ANION GAP SERPL CALC-SCNC: 11 MMOL/L (ref 10–20)
ANION GAP SERPL CALC-SCNC: 17 MMOL/L (ref 10–20)
APPARATUS: ABNORMAL
APPARATUS: ABNORMAL
AST SERPL W P-5'-P-CCNC: 62 U/L (ref 9–39)
BASE EXCESS BLDA CALC-SCNC: -1 MMOL/L (ref -2–3)
BASE EXCESS BLDA CALC-SCNC: -2.2 MMOL/L (ref -2–3)
BASE EXCESS BLDA CALC-SCNC: -4.2 MMOL/L (ref -2–3)
BASE EXCESS BLDA CALC-SCNC: -4.5 MMOL/L (ref -2–3)
BASE EXCESS BLDA CALC-SCNC: -4.7 MMOL/L (ref -2–3)
BASE EXCESS BLDA CALC-SCNC: -5.4 MMOL/L (ref -2–3)
BASE EXCESS BLDA CALC-SCNC: 1.1 MMOL/L (ref -2–3)
BASE EXCESS BLDA CALC-SCNC: 4.7 MMOL/L (ref -2–3)
BASE EXCESS BLDV CALC-SCNC: -2.2 MMOL/L (ref -2–3)
BILIRUB DIRECT SERPL-MCNC: 0.1 MG/DL (ref 0–0.3)
BILIRUB SERPL-MCNC: 0.4 MG/DL (ref 0–1.2)
BLOOD EXPIRATION DATE: NORMAL
BODY TEMPERATURE: 37 DEGREES CELSIUS
BUN SERPL-MCNC: 16 MG/DL (ref 6–23)
BUN SERPL-MCNC: 17 MG/DL (ref 6–23)
CA-I BLD-SCNC: 1.08 MMOL/L (ref 1.1–1.33)
CA-I BLD-SCNC: 1.11 MMOL/L (ref 1.1–1.33)
CA-I BLDA-SCNC: 1.11 MMOL/L (ref 1.1–1.33)
CA-I BLDA-SCNC: 1.11 MMOL/L (ref 1.1–1.33)
CA-I BLDA-SCNC: 1.13 MMOL/L (ref 1.1–1.33)
CA-I BLDA-SCNC: 1.13 MMOL/L (ref 1.1–1.33)
CA-I BLDA-SCNC: 1.15 MMOL/L (ref 1.1–1.33)
CA-I BLDA-SCNC: 1.15 MMOL/L (ref 1.1–1.33)
CA-I BLDA-SCNC: 1.17 MMOL/L (ref 1.1–1.33)
CA-I BLDA-SCNC: 1.17 MMOL/L (ref 1.1–1.33)
CA-I BLDV-SCNC: 1.17 MMOL/L (ref 1.1–1.33)
CALCIUM SERPL-MCNC: 7.7 MG/DL (ref 8.6–10.6)
CALCIUM SERPL-MCNC: 8.2 MG/DL (ref 8.6–10.6)
CHLORIDE BLDA-SCNC: 102 MMOL/L (ref 98–107)
CHLORIDE BLDA-SCNC: 104 MMOL/L (ref 98–107)
CHLORIDE BLDA-SCNC: 106 MMOL/L (ref 98–107)
CHLORIDE BLDA-SCNC: 106 MMOL/L (ref 98–107)
CHLORIDE BLDA-SCNC: 107 MMOL/L (ref 98–107)
CHLORIDE BLDA-SCNC: 108 MMOL/L (ref 98–107)
CHLORIDE BLDV-SCNC: 105 MMOL/L (ref 98–107)
CHLORIDE SERPL-SCNC: 103 MMOL/L (ref 98–107)
CHLORIDE SERPL-SCNC: 106 MMOL/L (ref 98–107)
CO2 SERPL-SCNC: 23 MMOL/L (ref 21–32)
CO2 SERPL-SCNC: 26 MMOL/L (ref 21–32)
CREAT SERPL-MCNC: 0.88 MG/DL (ref 0.5–1.3)
CREAT SERPL-MCNC: 1.06 MG/DL (ref 0.5–1.3)
DISPENSE STATUS: NORMAL
EGFRCR SERPLBLD CKD-EPI 2021: 75 ML/MIN/1.73M*2
EGFRCR SERPLBLD CKD-EPI 2021: >90 ML/MIN/1.73M*2
ERYTHROCYTE [DISTWIDTH] IN BLOOD BY AUTOMATED COUNT: 13.3 % (ref 11.5–14.5)
ERYTHROCYTE [DISTWIDTH] IN BLOOD BY AUTOMATED COUNT: 13.3 % (ref 11.5–14.5)
ERYTHROCYTE [DISTWIDTH] IN BLOOD BY AUTOMATED COUNT: 14.1 % (ref 11.5–14.5)
GLUCOSE BLD MANUAL STRIP-MCNC: 120 MG/DL (ref 74–99)
GLUCOSE BLD MANUAL STRIP-MCNC: 133 MG/DL (ref 74–99)
GLUCOSE BLD MANUAL STRIP-MCNC: 160 MG/DL (ref 74–99)
GLUCOSE BLDA-MCNC: 134 MG/DL (ref 74–99)
GLUCOSE BLDA-MCNC: 137 MG/DL (ref 74–99)
GLUCOSE BLDA-MCNC: 151 MG/DL (ref 74–99)
GLUCOSE BLDA-MCNC: 151 MG/DL (ref 74–99)
GLUCOSE BLDA-MCNC: 155 MG/DL (ref 74–99)
GLUCOSE BLDA-MCNC: 181 MG/DL (ref 74–99)
GLUCOSE BLDA-MCNC: 185 MG/DL (ref 74–99)
GLUCOSE BLDA-MCNC: 240 MG/DL (ref 74–99)
GLUCOSE BLDV-MCNC: 166 MG/DL (ref 74–99)
GLUCOSE SERPL-MCNC: 153 MG/DL (ref 74–99)
GLUCOSE SERPL-MCNC: 243 MG/DL (ref 74–99)
HCO3 BLDA-SCNC: 20.7 MMOL/L (ref 22–26)
HCO3 BLDA-SCNC: 21.1 MMOL/L (ref 22–26)
HCO3 BLDA-SCNC: 21.1 MMOL/L (ref 22–26)
HCO3 BLDA-SCNC: 21.6 MMOL/L (ref 22–26)
HCO3 BLDA-SCNC: 23.7 MMOL/L (ref 22–26)
HCO3 BLDA-SCNC: 24.3 MMOL/L (ref 22–26)
HCO3 BLDA-SCNC: 26.6 MMOL/L (ref 22–26)
HCO3 BLDA-SCNC: 29.8 MMOL/L (ref 22–26)
HCO3 BLDV-SCNC: 24.5 MMOL/L (ref 22–26)
HCT VFR BLD AUTO: 22.1 % (ref 41–52)
HCT VFR BLD AUTO: 23.8 % (ref 41–52)
HCT VFR BLD AUTO: 24.1 % (ref 41–52)
HCT VFR BLD EST: 24 % (ref 41–52)
HCT VFR BLD EST: 25 % (ref 41–52)
HCT VFR BLD EST: 26 % (ref 41–52)
HCT VFR BLD EST: 26 % (ref 41–52)
HCT VFR BLD EST: 27 % (ref 41–52)
HCT VFR BLD EST: 28 % (ref 41–52)
HCT VFR BLD EST: 28 % (ref 41–52)
HGB BLD-MCNC: 7.7 G/DL (ref 13.5–17.5)
HGB BLD-MCNC: 8.4 G/DL (ref 13.5–17.5)
HGB BLD-MCNC: 8.5 G/DL (ref 13.5–17.5)
HGB BLDA-MCNC: 7.9 G/DL (ref 13.5–17.5)
HGB BLDA-MCNC: 8.5 G/DL (ref 13.5–17.5)
HGB BLDA-MCNC: 8.6 G/DL (ref 13.5–17.5)
HGB BLDA-MCNC: 9.1 G/DL (ref 13.5–17.5)
HGB BLDA-MCNC: 9.2 G/DL (ref 13.5–17.5)
HGB BLDA-MCNC: 9.3 G/DL (ref 13.5–17.5)
HGB BLDV-MCNC: 8.2 G/DL (ref 13.5–17.5)
INHALED O2 CONCENTRATION: 29 %
INHALED O2 CONCENTRATION: 36 %
INHALED O2 CONCENTRATION: 40 %
INHALED O2 CONCENTRATION: 48 %
INHALED O2 CONCENTRATION: 50 %
INHALED O2 CONCENTRATION: 50 %
LACTATE BLDA-SCNC: 1.5 MMOL/L (ref 0.4–2)
LACTATE BLDA-SCNC: 2.1 MMOL/L (ref 0.4–2)
LACTATE BLDA-SCNC: 3.4 MMOL/L (ref 0.4–2)
LACTATE BLDA-SCNC: 4.9 MMOL/L (ref 0.4–2)
LACTATE BLDA-SCNC: 6.5 MMOL/L (ref 0.4–2)
LACTATE BLDA-SCNC: 6.6 MMOL/L (ref 0.4–2)
LACTATE BLDA-SCNC: 6.8 MMOL/L (ref 0.4–2)
LACTATE BLDA-SCNC: 6.9 MMOL/L (ref 0.4–2)
LACTATE BLDV-SCNC: 5.2 MMOL/L (ref 0.4–2)
LACTATE BLDV-SCNC: 5.2 MMOL/L (ref 0.4–2)
LACTATE BLDV-SCNC: 6.5 MMOL/L (ref 0.4–2)
LACTATE BLDV-SCNC: 6.9 MMOL/L (ref 0.4–2)
LACTATE BLDV-SCNC: 7.1 MMOL/L (ref 0.4–2)
LACTATE BLDV-SCNC: 7.2 MMOL/L (ref 0.4–2)
MAGNESIUM SERPL-MCNC: 1.8 MG/DL (ref 1.6–2.4)
MAGNESIUM SERPL-MCNC: 2.03 MG/DL (ref 1.6–2.4)
MCH RBC QN AUTO: 31.3 PG (ref 26–34)
MCH RBC QN AUTO: 31.6 PG (ref 26–34)
MCH RBC QN AUTO: 32 PG (ref 26–34)
MCHC RBC AUTO-ENTMCNC: 34.8 G/DL (ref 32–36)
MCHC RBC AUTO-ENTMCNC: 34.9 G/DL (ref 32–36)
MCHC RBC AUTO-ENTMCNC: 35.7 G/DL (ref 32–36)
MCV RBC AUTO: 90 FL (ref 80–100)
MCV RBC AUTO: 90 FL (ref 80–100)
MCV RBC AUTO: 91 FL (ref 80–100)
NRBC BLD-RTO: 0 /100 WBCS (ref 0–0)
OXYHGB MFR BLDA: 95.8 % (ref 94–98)
OXYHGB MFR BLDA: 96.6 % (ref 94–98)
OXYHGB MFR BLDA: 96.8 % (ref 94–98)
OXYHGB MFR BLDA: 97.4 % (ref 94–98)
OXYHGB MFR BLDA: 97.4 % (ref 94–98)
OXYHGB MFR BLDA: 97.7 % (ref 94–98)
OXYHGB MFR BLDA: 98 % (ref 94–98)
OXYHGB MFR BLDA: 98.1 % (ref 94–98)
OXYHGB MFR BLDV: 72.2 % (ref 45–75)
PCO2 BLDA: 40 MM HG (ref 38–42)
PCO2 BLDA: 41 MM HG (ref 38–42)
PCO2 BLDA: 42 MM HG (ref 38–42)
PCO2 BLDA: 45 MM HG (ref 38–42)
PCO2 BLDA: 46 MM HG (ref 38–42)
PCO2 BLDA: 46 MM HG (ref 38–42)
PCO2 BLDV: 51 MM HG (ref 41–51)
PH BLDA: 7.3 PH (ref 7.38–7.42)
PH BLDA: 7.32 PH (ref 7.38–7.42)
PH BLDA: 7.32 PH (ref 7.38–7.42)
PH BLDA: 7.33 PH (ref 7.38–7.42)
PH BLDA: 7.33 PH (ref 7.38–7.42)
PH BLDA: 7.37 PH (ref 7.38–7.42)
PH BLDA: 7.37 PH (ref 7.38–7.42)
PH BLDA: 7.42 PH (ref 7.38–7.42)
PH BLDV: 7.29 PH (ref 7.33–7.43)
PHOSPHATE SERPL-MCNC: 2.8 MG/DL (ref 2.5–4.9)
PHOSPHATE SERPL-MCNC: 3.4 MG/DL (ref 2.5–4.9)
PLATELET # BLD AUTO: 120 X10*3/UL (ref 150–450)
PLATELET # BLD AUTO: 127 X10*3/UL (ref 150–450)
PLATELET # BLD AUTO: 131 X10*3/UL (ref 150–450)
PO2 BLDA: 107 MM HG (ref 85–95)
PO2 BLDA: 122 MM HG (ref 85–95)
PO2 BLDA: 139 MM HG (ref 85–95)
PO2 BLDA: 156 MM HG (ref 85–95)
PO2 BLDA: 161 MM HG (ref 85–95)
PO2 BLDA: 76 MM HG (ref 85–95)
PO2 BLDA: 82 MM HG (ref 85–95)
PO2 BLDA: 96 MM HG (ref 85–95)
PO2 BLDV: 45 MM HG (ref 35–45)
POTASSIUM BLDA-SCNC: 4.2 MMOL/L (ref 3.5–5.3)
POTASSIUM BLDA-SCNC: 4.3 MMOL/L (ref 3.5–5.3)
POTASSIUM BLDA-SCNC: 4.4 MMOL/L (ref 3.5–5.3)
POTASSIUM BLDA-SCNC: 4.4 MMOL/L (ref 3.5–5.3)
POTASSIUM BLDA-SCNC: 4.5 MMOL/L (ref 3.5–5.3)
POTASSIUM BLDA-SCNC: 4.5 MMOL/L (ref 3.5–5.3)
POTASSIUM BLDV-SCNC: 4.5 MMOL/L (ref 3.5–5.3)
POTASSIUM SERPL-SCNC: 4.1 MMOL/L (ref 3.5–5.3)
POTASSIUM SERPL-SCNC: 4.2 MMOL/L (ref 3.5–5.3)
PRODUCT BLOOD TYPE: 1700
PRODUCT BLOOD TYPE: 5100
PRODUCT BLOOD TYPE: 6200
PRODUCT CODE: NORMAL
PROT SERPL-MCNC: 5.8 G/DL (ref 6.4–8.2)
RBC # BLD AUTO: 2.44 X10*6/UL (ref 4.5–5.9)
RBC # BLD AUTO: 2.66 X10*6/UL (ref 4.5–5.9)
RBC # BLD AUTO: 2.68 X10*6/UL (ref 4.5–5.9)
SAO2 % BLDA: 100 % (ref 94–100)
SAO2 % BLDA: 98 % (ref 94–100)
SAO2 % BLDA: 99 % (ref 94–100)
SAO2 % BLDA: 99 % (ref 94–100)
SAO2 % BLDV: 74 % (ref 45–75)
SODIUM BLDA-SCNC: 135 MMOL/L (ref 136–145)
SODIUM BLDA-SCNC: 136 MMOL/L (ref 136–145)
SODIUM BLDA-SCNC: 139 MMOL/L (ref 136–145)
SODIUM BLDA-SCNC: 140 MMOL/L (ref 136–145)
SODIUM BLDA-SCNC: 140 MMOL/L (ref 136–145)
SODIUM BLDA-SCNC: 141 MMOL/L (ref 136–145)
SODIUM BLDV-SCNC: 139 MMOL/L (ref 136–145)
SODIUM SERPL-SCNC: 136 MMOL/L (ref 136–145)
SODIUM SERPL-SCNC: 142 MMOL/L (ref 136–145)
SPECIMEN DRAWN FROM PATIENT: ABNORMAL
STAPHYLOCOCCUS SPEC CULT: NORMAL
TIDAL VOLUME: 550 ML
UNIT ABO: NORMAL
UNIT NUMBER: NORMAL
UNIT RH: NORMAL
UNIT VOLUME: 223
UNIT VOLUME: 280
UNIT VOLUME: 284
UNIT VOLUME: 350
VENTILATOR MODE: ABNORMAL
VENTILATOR MODE: ABNORMAL
VENTILATOR RATE: 16 BPM
WBC # BLD AUTO: 10 X10*3/UL (ref 4.4–11.3)
WBC # BLD AUTO: 7.8 X10*3/UL (ref 4.4–11.3)
WBC # BLD AUTO: 8.2 X10*3/UL (ref 4.4–11.3)
XM INTEP: NORMAL

## 2024-06-29 PROCEDURE — 84132 ASSAY OF SERUM POTASSIUM: CPT | Performed by: STUDENT IN AN ORGANIZED HEALTH CARE EDUCATION/TRAINING PROGRAM

## 2024-06-29 PROCEDURE — 83735 ASSAY OF MAGNESIUM: CPT | Performed by: STUDENT IN AN ORGANIZED HEALTH CARE EDUCATION/TRAINING PROGRAM

## 2024-06-29 PROCEDURE — P9016 RBC LEUKOCYTES REDUCED: HCPCS

## 2024-06-29 PROCEDURE — 2500000001 HC RX 250 WO HCPCS SELF ADMINISTERED DRUGS (ALT 637 FOR MEDICARE OP)

## 2024-06-29 PROCEDURE — 71045 X-RAY EXAM CHEST 1 VIEW: CPT

## 2024-06-29 PROCEDURE — 82330 ASSAY OF CALCIUM: CPT | Performed by: STUDENT IN AN ORGANIZED HEALTH CARE EDUCATION/TRAINING PROGRAM

## 2024-06-29 PROCEDURE — P9045 ALBUMIN (HUMAN), 5%, 250 ML: HCPCS | Mod: JZ

## 2024-06-29 PROCEDURE — 2500000001 HC RX 250 WO HCPCS SELF ADMINISTERED DRUGS (ALT 637 FOR MEDICARE OP): Performed by: STUDENT IN AN ORGANIZED HEALTH CARE EDUCATION/TRAINING PROGRAM

## 2024-06-29 PROCEDURE — 2500000004 HC RX 250 GENERAL PHARMACY W/ HCPCS (ALT 636 FOR OP/ED): Mod: JZ

## 2024-06-29 PROCEDURE — 2500000004 HC RX 250 GENERAL PHARMACY W/ HCPCS (ALT 636 FOR OP/ED)

## 2024-06-29 PROCEDURE — 2500000005 HC RX 250 GENERAL PHARMACY W/O HCPCS: Performed by: STUDENT IN AN ORGANIZED HEALTH CARE EDUCATION/TRAINING PROGRAM

## 2024-06-29 PROCEDURE — 37799 UNLISTED PX VASCULAR SURGERY: CPT | Performed by: STUDENT IN AN ORGANIZED HEALTH CARE EDUCATION/TRAINING PROGRAM

## 2024-06-29 PROCEDURE — 2500000004 HC RX 250 GENERAL PHARMACY W/ HCPCS (ALT 636 FOR OP/ED): Performed by: STUDENT IN AN ORGANIZED HEALTH CARE EDUCATION/TRAINING PROGRAM

## 2024-06-29 PROCEDURE — 74018 RADEX ABDOMEN 1 VIEW: CPT | Performed by: RADIOLOGY

## 2024-06-29 PROCEDURE — 2500000002 HC RX 250 W HCPCS SELF ADMINISTERED DRUGS (ALT 637 FOR MEDICARE OP, ALT 636 FOR OP/ED)

## 2024-06-29 PROCEDURE — 71045 X-RAY EXAM CHEST 1 VIEW: CPT | Performed by: RADIOLOGY

## 2024-06-29 PROCEDURE — 36430 TRANSFUSION BLD/BLD COMPNT: CPT

## 2024-06-29 PROCEDURE — 36415 COLL VENOUS BLD VENIPUNCTURE: CPT | Performed by: STUDENT IN AN ORGANIZED HEALTH CARE EDUCATION/TRAINING PROGRAM

## 2024-06-29 PROCEDURE — 2020000001 HC ICU ROOM DAILY

## 2024-06-29 PROCEDURE — 84132 ASSAY OF SERUM POTASSIUM: CPT | Performed by: THORACIC SURGERY (CARDIOTHORACIC VASCULAR SURGERY)

## 2024-06-29 PROCEDURE — 2500000002 HC RX 250 W HCPCS SELF ADMINISTERED DRUGS (ALT 637 FOR MEDICARE OP, ALT 636 FOR OP/ED): Performed by: STUDENT IN AN ORGANIZED HEALTH CARE EDUCATION/TRAINING PROGRAM

## 2024-06-29 PROCEDURE — 85027 COMPLETE CBC AUTOMATED: CPT | Performed by: STUDENT IN AN ORGANIZED HEALTH CARE EDUCATION/TRAINING PROGRAM

## 2024-06-29 PROCEDURE — 82947 ASSAY GLUCOSE BLOOD QUANT: CPT

## 2024-06-29 PROCEDURE — 74018 RADEX ABDOMEN 1 VIEW: CPT

## 2024-06-29 PROCEDURE — 99291 CRITICAL CARE FIRST HOUR: CPT

## 2024-06-29 PROCEDURE — 83605 ASSAY OF LACTIC ACID: CPT | Performed by: STUDENT IN AN ORGANIZED HEALTH CARE EDUCATION/TRAINING PROGRAM

## 2024-06-29 PROCEDURE — 94002 VENT MGMT INPAT INIT DAY: CPT

## 2024-06-29 PROCEDURE — 82040 ASSAY OF SERUM ALBUMIN: CPT | Performed by: STUDENT IN AN ORGANIZED HEALTH CARE EDUCATION/TRAINING PROGRAM

## 2024-06-29 RX ORDER — ONDANSETRON 4 MG/1
4 TABLET, ORALLY DISINTEGRATING ORAL EVERY 8 HOURS PRN
Status: DISCONTINUED | OUTPATIENT
Start: 2024-06-29 | End: 2024-06-30

## 2024-06-29 RX ORDER — ONDANSETRON HYDROCHLORIDE 2 MG/ML
4 INJECTION, SOLUTION INTRAVENOUS EVERY 8 HOURS PRN
Status: DISPENSED | OUTPATIENT
Start: 2024-06-29

## 2024-06-29 RX ORDER — HYDRALAZINE HYDROCHLORIDE 20 MG/ML
5 INJECTION INTRAMUSCULAR; INTRAVENOUS EVERY 4 HOURS PRN
Status: DISCONTINUED | OUTPATIENT
Start: 2024-06-29 | End: 2024-06-30

## 2024-06-29 RX ORDER — INSULIN LISPRO 100 [IU]/ML
0-15 INJECTION, SOLUTION INTRAVENOUS; SUBCUTANEOUS
Status: DISCONTINUED | OUTPATIENT
Start: 2024-06-29 | End: 2024-06-30

## 2024-06-29 RX ORDER — METOPROLOL TARTRATE 25 MG/1
25 TABLET, FILM COATED ORAL 2 TIMES DAILY
Status: DISCONTINUED | OUTPATIENT
Start: 2024-06-29 | End: 2024-06-30

## 2024-06-29 RX ORDER — HYDROMORPHONE HYDROCHLORIDE 0.2 MG/ML
0.2 INJECTION INTRAMUSCULAR; INTRAVENOUS; SUBCUTANEOUS EVERY 2 HOUR PRN
Status: ACTIVE | OUTPATIENT
Start: 2024-06-29

## 2024-06-29 RX ORDER — DEXTROSE 50 % IN WATER (D50W) INTRAVENOUS SYRINGE
12.5
Status: ACTIVE | OUTPATIENT
Start: 2024-06-29

## 2024-06-29 RX ORDER — ALBUMIN HUMAN 50 G/1000ML
SOLUTION INTRAVENOUS
Status: COMPLETED
Start: 2024-06-29 | End: 2024-06-29

## 2024-06-29 RX ORDER — BISACODYL 10 MG/1
10 SUPPOSITORY RECTAL ONCE
Status: DISCONTINUED | OUTPATIENT
Start: 2024-06-29 | End: 2024-06-30

## 2024-06-29 RX ORDER — ONDANSETRON HYDROCHLORIDE 2 MG/ML
4 INJECTION, SOLUTION INTRAVENOUS ONCE
Status: COMPLETED | OUTPATIENT
Start: 2024-06-29 | End: 2024-06-29

## 2024-06-29 RX ORDER — ALBUMIN HUMAN 50 G/1000ML
12.5 SOLUTION INTRAVENOUS ONCE
Status: COMPLETED | OUTPATIENT
Start: 2024-06-29 | End: 2024-06-29

## 2024-06-29 RX ORDER — NITROGLYCERIN 20 MG/100ML
0-200 INJECTION INTRAVENOUS CONTINUOUS
Status: DISCONTINUED | OUTPATIENT
Start: 2024-06-29 | End: 2024-06-29

## 2024-06-29 RX ORDER — DEXTROSE 50 % IN WATER (D50W) INTRAVENOUS SYRINGE
25
Status: ACTIVE | OUTPATIENT
Start: 2024-06-29

## 2024-06-29 RX ORDER — INSULIN LISPRO 100 [IU]/ML
0-10 INJECTION, SOLUTION INTRAVENOUS; SUBCUTANEOUS
Status: DISCONTINUED | OUTPATIENT
Start: 2024-06-29 | End: 2024-06-29

## 2024-06-29 RX ORDER — METOPROLOL TARTRATE 25 MG/1
12.5 TABLET, FILM COATED ORAL ONCE
Status: COMPLETED | OUTPATIENT
Start: 2024-06-29 | End: 2024-06-29

## 2024-06-29 RX ORDER — POLYETHYLENE GLYCOL 3350 17 G/17G
17 POWDER, FOR SOLUTION ORAL 2 TIMES DAILY
Status: DISCONTINUED | OUTPATIENT
Start: 2024-06-29 | End: 2024-06-30

## 2024-06-29 RX ORDER — ASPIRIN 300 MG/1
81 SUPPOSITORY RECTAL ONCE
Status: DISCONTINUED | OUTPATIENT
Start: 2024-06-29 | End: 2024-06-29

## 2024-06-29 RX ORDER — ASPIRIN 300 MG/1
150 SUPPOSITORY RECTAL ONCE
Status: COMPLETED | OUTPATIENT
Start: 2024-06-29 | End: 2024-06-29

## 2024-06-29 RX ORDER — ISOSORBIDE DINITRATE 10 MG/1
10 TABLET ORAL
Status: DISPENSED | OUTPATIENT
Start: 2024-06-29

## 2024-06-29 RX ORDER — METOPROLOL TARTRATE 25 MG/1
12.5 TABLET, FILM COATED ORAL 2 TIMES DAILY
Status: DISCONTINUED | OUTPATIENT
Start: 2024-06-29 | End: 2024-06-29

## 2024-06-29 RX ORDER — HEPARIN SODIUM 5000 [USP'U]/ML
5000 INJECTION, SOLUTION INTRAVENOUS; SUBCUTANEOUS EVERY 8 HOURS
Status: DISPENSED | OUTPATIENT
Start: 2024-06-29

## 2024-06-29 RX ORDER — NAPROXEN SODIUM 220 MG/1
81 TABLET, FILM COATED ORAL ONCE
Status: DISCONTINUED | OUTPATIENT
Start: 2024-06-29 | End: 2024-06-29

## 2024-06-29 RX ORDER — ONDANSETRON HYDROCHLORIDE 2 MG/ML
INJECTION, SOLUTION INTRAVENOUS
Status: COMPLETED
Start: 2024-06-29 | End: 2024-06-29

## 2024-06-29 ASSESSMENT — COGNITIVE AND FUNCTIONAL STATUS - GENERAL
MOBILITY SCORE: 12
TURNING FROM BACK TO SIDE WHILE IN FLAT BAD: A LOT
MOVING FROM LYING ON BACK TO SITTING ON SIDE OF FLAT BED WITH BEDRAILS: A LITTLE
STANDING UP FROM CHAIR USING ARMS: A LOT
MOVING TO AND FROM BED TO CHAIR: A LOT
WALKING IN HOSPITAL ROOM: A LOT
CLIMB 3 TO 5 STEPS WITH RAILING: TOTAL

## 2024-06-29 ASSESSMENT — PAIN - FUNCTIONAL ASSESSMENT
PAIN_FUNCTIONAL_ASSESSMENT: 0-10

## 2024-06-29 ASSESSMENT — PAIN SCALES - WONG BAKER
WONGBAKER_NUMERICALRESPONSE: HURTS WHOLE LOT
WONGBAKER_NUMERICALRESPONSE: HURTS EVEN MORE
WONGBAKER_NUMERICALRESPONSE: HURTS LITTLE BIT

## 2024-06-29 ASSESSMENT — PAIN SCALES - GENERAL
PAINLEVEL_OUTOF10: 3
PAINLEVEL_OUTOF10: 5 - MODERATE PAIN
PAINLEVEL_OUTOF10: 7
PAINLEVEL_OUTOF10: 3
PAINLEVEL_OUTOF10: 3
PAINLEVEL_OUTOF10: 7
PAINLEVEL_OUTOF10: 5 - MODERATE PAIN

## 2024-06-29 ASSESSMENT — PAIN DESCRIPTION - DESCRIPTORS
DESCRIPTORS: ACHING;SORE

## 2024-06-29 ASSESSMENT — PAIN DESCRIPTION - LOCATION: LOCATION: CHEST

## 2024-06-29 ASSESSMENT — PAIN DESCRIPTION - ORIENTATION: ORIENTATION: MID

## 2024-06-29 NOTE — OP NOTE
pump assist MIDCAB x 2 LIMA-LAD and radial-OM  left endovascular radial harvest, left femoral cutdown and cannulation Operative Note     Date: 2024  OR Location: [facility] OR    Name: Felice Almonte, : 1955, Age: 69 y.o., MRN: [id number], Sex: male    Diagnosis  Pre-op Diagnosis     * Coronary artery disease (CAD) excluded [Z03.89] Post-op Diagnosis     * Coronary artery disease (CAD) excluded [Z03.89]     Procedures  pump assist MIDCAB x 2 LIMA-LAD and radial-OM  left endovascular radial harvest, left femoral cutdown and cannulation  38694 - OR CABG W/ARTERIAL GRAFT TWO ARTERIAL GRAFTS    1.  Multivessel MIDCAB.  On pump beating heart min invasive left thoracotomy CABG x 2 with LIMA to LAD and radial to OM.    2 endoscopic radial harvest  Surgeons      * Jose De Jesus Shoemaker - Primary    Resident/Fellow/Other Assistant:  Surgeons and Role:     * Frankie Barr MD - Assisting    Procedure Summary  Anesthesia: General  ASA: IV  Anesthesia Staff: Anesthesiologist: Sergei Alexis MD; Darrell Bella MD  C-AA: LATHA Balbuena  Anesthesia Resident: Lama Irina MD; Chayo Puckett MD  Perfusionist: Keerthi Gaines; Alvino Díaz  Estimated Blood Loss: 200 mL  Intra-op Medications:   Administrations occurring from  to 193 on 24:   Medication Name Total Dose   heparin 1,000 unit/mL injection 10,000 Units   papaverine injection 180 mg   sodium chloride 0.9 % irrigation solution 1,000 mL   aspirin EC tablet 81 mg Cannot be calculated   dextrose 50 % injection 12.5 g Cannot be calculated   dextrose 50 % injection 25 g Cannot be calculated   glucagon (Glucagen) injection 1 mg Cannot be calculated   glucagon (Glucagen) injection 1 mg Cannot be calculated   insulin lispro (HumaLOG) injection 0-5 Units Cannot be calculated   metoprolol tartrate (Lopressor) tablet 25 mg Cannot be calculated   mupirocin (Bactroban) 2 % ointment Cannot be calculated   rosuvastatin (Crestor) tablet 40 mg Cannot be  calculated         Intraprocedure I/O Totals       None           Specimen: No specimens collected     Staff:   Circulator: Donna  Circulator: Angeles  Circulator: Chante Stinsonub Person: Josue Arnold Person: Ernestine Haider Circulator: Pepper Haider Scrub: Pepper  Scrub Person: Tavares         Drains and/or Catheters:   Chest Tube 1 Left (Active)   Function -20 cm H2O 06/29/24 1200   Chest Tube Air Leak No 06/29/24 1200   Patency Intervention Tip/tilt 06/29/24 0800   Drainage Description Sanguineous 06/29/24 1200   Dressing Status Clean;Dry;Occlusive 06/29/24 1200   Site Assessment Clean;Dry;Intact 06/29/24 1200   Surrounding Skin Dry;Intact 06/29/24 1200   Output (mL) 140 mL 06/29/24 1300       Urethral Catheter Double-lumen;Non-latex;Temperature probe 14 Fr. (Active)   Site Assessment Clean;Skin intact 06/29/24 1200   Collection Container Standard drainage bag 06/29/24 1200   Securement Method Securing device (Describe) 06/29/24 1200   Reason for Continuing Urinary Catheterization accurate hourly measurement of urine volume in a critically ill patient that cannot be assessed by other volumes and urine collection strategies 06/29/24 1200   Output (mL) 30 mL 06/29/24 1300       Tourniquet Times:     Total Tourniquet Time Documented:  Arm - Upper (Left) - 15 minutes  Total: Arm - Upper (Left) - 15 minutes      Implants:     Findings: 1.There were no pericardial adhesions or effusions.   2. The ascending aorta was soft without evidence of atherosclerosis.   3. The heart was normal sinus rhythm and demonstrated 35% left ventricular function.    4.The patient had severe diffuse coronary artery disease, however we were able to find locations on the LAD and OM that was suitable for grafting.  5.  The conduits were suitable for grafting.  6.  The flow of the graft after the protamine were acceptable.     Indications: Felice Almonte is an 69 y.o. male who is having surgery for Coronary artery disease (CAD) excluded  [Z03.89]. Felice Almonte is a 69 year old male with PMH of NIDDM, HTN presented as transfer from [facility] 6/26 for critical LM disease. He presented for outpatient [facility] with the complaint of chest pain, on and off, for about 10 months which has worsened.  We extensive discussed with the patient different options including full median sternotomy and a mini invasive approach.  He really want to have a mini invasive surgery because he like the idea of a faster recovery because he have a brother who is dying from advanced cancer at home and he will would like to be with him.  We discussed the risk and benefit with him and the family and he wished to proceed.  The patient was seen in the preoperative area. The risks, benefits, complications, treatment options, non-operative alternatives, expected recovery and outcomes were discussed with the patient. The possibilities of reaction to medication, pulmonary aspiration, injury to surrounding structures, bleeding, recurrent infection, the need for additional procedures, failure to diagnose a condition, and creating a complication requiring transfusion or operation were discussed with the patient. The patient concurred with the proposed plan, giving informed consent.  The site of surgery was properly noted/marked if necessary per policy. The patient has been actively warmed in preoperative area. Preoperative antibiotics have been ordered and given within 1 hours of incision. Venous thrombosis prophylaxis have been ordered including chemical prophylaxis    Procedure Details: After informed consent, and positive identification, the patient was brought to the operative theatre. They were placed on the operating room table in the supine position and an arterial monitoring line was placed. They subsequently  underwent induction of anesthesia, and karla-tracheal intubation.  A JACK probe was placed in the esophagus, and central intravenous access obtained. They were then prepped and draped  in a sterile surgical fashion.      A surgical time-out was performed with the correct patient, correct procedure, laterality, timing and dosage of antibiotics, availability of blood, administration of beta blocker, fire risk, and sterility of  instruments were all verified, before beginning the case.    The patient was taken to the operating room by anesthesia, placed supine on the operating table, intubated  with a double lumen tube and placed under general anesthesia.  A central line and matty were placed. JACK was performed which confirmed the preoperative findings of reduced EF without significant valvular disease.  The patient was positioned with a bump under the left side and was prepped and draped in the usual sterile fashion.  A time out was performed. The patient was placed on single right lung ventilation. A 5cm anterior thoracotomy was made in the 4th intercostal space.  The thoratrack retractor was placed and the mammary was harvested as a small pedicle.  In the meantime the radial was harvested endoscopically.  Heparin was administered and the mammary was divided distally.  The long thoratrack blade was exchanged for a standard blade, a wound protector was placed and the retractor placed back in the 4th intercostal space.  The pericardium was opened and the LAD and OM where identified.    When we start to mobilize the heart to expose the OM because of his critical left main disease the patient became bradycardic and the heart distended.  Because of that we decided to perform as a beating heart assisted CABG.  In order to do I would proceed to perform a femoral cannulation using a 17 arterial cannula in the femoral artery and a 23 long femoral vein cannula in the femoral vein.  We dissected the femoral vein and artery and put  the patient on bypass uneventfully.  With the patient on bypass we exposed the obtuse marginal and perform the first distal using the radial graft this was done using the heart  positioner to expose and the heart stabilizer to stabilize the desired part of the OM.  Then the anastomosis was performed using several Prolene in a running fashion.  Immediately after that we performed the Y anastomosis between the LIMA and the radial.  Then we moved to the LAD.  The stabilizer device was inserted from a sub-xyphoid position and placed around the LAD. The mammary was prepared.  The LAD was opened and a 2mm mm shunt was placed.  The LIMA was anastomosed using a running 7-0 prolene suture. We used micro Kor nott to tied down the 7-0 Prolene. The bulldog was removed from the mammary and flows were assess with the flow probe.  There was good flow with a low PI.  The patient was easily weaned from cardiopulmonary bypass and the cannula were removed.  Both the femoral artery and the femoral vein were closed using 5-0 Prolene.  The stabilizer was removed and the flows were confirmed and noted to still be appropriate.  Protamine was administered.  After the protamine when we checked the flow of the graft we realized that we have a worsening on the LIMA to LAD flow.  Although the patient have no ST changes nor left wall abnormalities we decided to redo the distal graft.  In order to that we use a small piece of radial that we anastomose to the LAD.  With a new configuration the patient have a Y graft with the LIMA extending as a Y graft with a radial to the LAD and the OM.  The flow in this case after the protamine was outstanding with about 100 mL with very low PI's.  Hemostasis was meticulously secured. The protamine was given an chest tube was placed through the track where the stabilizer device had been inserted.  The ribs were reapproximated with #5 vicryl sutures.  The fascia was closed with running 0-vicryl suture.  The subcutaneous tissue was closed in layers with the skin closed with a running 4-0 vicryl suture.  A dry sterile dressing was applied.  All instrument, needle and sponge counts were  correct at the end of the case.  The patient was extubated and transferred to the ICU in stable condition.    Complications:  None; patient tolerated the procedure well.    Disposition: ICU - intubated and hemodynamically stable.  Condition: stable         Additional Details:     Attending Attestation: I was present and scrubbed for the entire procedure.    Jose De Jesus Shoemaker  Phone Number: [phone]

## 2024-06-29 NOTE — CARE PLAN
"The patient's goals for the shift include \"I want water\"    The clinical goals for the shift include to be hemodynamically stable and be free from falls/injuries throughout shift    Over the shift, the patient did not make progress toward the following goals. Barriers to progression include . Recommendations to address these barriers include .    "

## 2024-06-29 NOTE — BRIEF OP NOTE
Date: 2024 - 2024  OR Location: [facility] OR    Name: Felice Almonte, : 1955, Age: 69 y.o., MRN: [id number], Sex: male    Diagnosis  Pre-op Diagnosis     * Coronary artery disease (CAD) excluded [Z03.89] Post-op Diagnosis     * Coronary artery disease (CAD) excluded [Z03.89]     Procedures  pump assist MIDCAB x 2 LIMA-LAD and radial- left endovascular radial harvest  31686 - NE CABG W/ARTERIAL GRAFT TWO ARTERIAL GRAFTS   1. Left randy-lateral mini incision  2. Left endoscopic radial artery harvest  3. Left femoral artery and vein cutdown for on-pump support  4. Midcab x 2: LIMA to LAD, LIMA to RA, and RA to OM  5. Closure of Midcab incision    Chest Tubes/Drains: 1 left pleural chest tube       Temporary Pacing Wires: No    -Settings:   -Underlying Rhythm:SR    Permanent pacer/ICD: No   -Preoperative settings:    -Intra-op/ Postoperative settings:    Sternotomy performed by: JAIME    Conduit Harvested by: Left radial: Romulo Talha    Sternal Wires placed by: JAIME    Arm/Leg/Groin Closure/Cutdown performed by: Gal Alexander    Cardio Pulmonary Bypass Time: 87 minutes  Cross-clamp Time: NA  Circulatory Arrest: No Time:     Is patient candidate for Emergency Re-sternotomy? No   -If yes, POD #10 is -      Surgeons      * Jose De Jesus Shoemaker - Primary    Resident/Fellow/Other Assistant:  Surgeons and Role:     * Frankie Barr MD - Teodora Jaramillo SA-LISA Palencia RNFA   Procedure Summary  Anesthesia: General  ASA: IV  Anesthesia Staff: Anesthesiologist: Sergei Alexis MD; Darrell Bella MD  C-AA: LATHA Balbuena  Anesthesia Resident: Lama Irina MD; Chayo Puckett MD  Perfusionist: Keerthi Gaines; Alvino Díaz  Estimated Blood Loss: 250mL  Intra-op Medications:   Administrations occurring from 1 to 193 on 24:   Medication Name Total Dose   heparin 1,000 unit/mL injection 10,000 Units   papaverine injection 180 mg   sodium chloride 0.9 % irrigation solution 1,000 mL   aspirin EC  tablet 81 mg Cannot be calculated   dextrose 50 % injection 12.5 g Cannot be calculated   dextrose 50 % injection 25 g Cannot be calculated   glucagon (Glucagen) injection 1 mg Cannot be calculated   glucagon (Glucagen) injection 1 mg Cannot be calculated   insulin lispro (HumaLOG) injection 0-5 Units Cannot be calculated   metoprolol tartrate (Lopressor) tablet 25 mg Cannot be calculated   mupirocin (Bactroban) 2 % ointment Cannot be calculated   rosuvastatin (Crestor) tablet 40 mg Cannot be calculated              Anesthesia Record               Intraprocedure I/O Totals          Intake    Magnesium Sulfate 0.00 mL    The total shown is the total volume documented since Anesthesia Start was filed.    Norepinephrine Drip 0.00 mL    The total shown is the total volume documented since Anesthesia Start was filed.    Nitroglycerin Drip 0.00 mL    The total shown is the total volume documented since Anesthesia Start was filed.    Epinephrine Drip 0.00 mL    The total shown is the total volume documented since Anesthesia Start was filed.    Phenylephrine Drip 0.00 mL    The total shown is the total volume documented since Anesthesia Start was filed.    Vasopressin Drip 0.00 mL    The total shown is the total volume documented since Anesthesia Start was filed.    Cell Saver 455 mL    Total Intake 455 mL       Output    Urine 365 mL    Total Output 365 mL       Net    Net Volume 90 mL          Specimen: No specimens collected     Staff:   Circulator: Donna  Circulator: Angeles  Circulator: Chante Arnold Person: Josue Stinsonub Person: Ernestine Haider Circulator: Pepper Haider Scrub: Pepper          Findings: Severe CAD    Complications:  None; patient tolerated the procedure well.     Disposition: ICU - intubated and hemodynamically stable.  Condition: stable  Specimens Collected: No specimens collected  Attending Attestation: I was present for the entire procedure.    Jose De Jesus Shoemaker  Phone Number: [phone]

## 2024-06-29 NOTE — PROGRESS NOTES
"CTICU Progress Note  Felice Almotne/[id number]    Admit Date: 6/26/2024  Hospital Length of Stay: 3   ICU Length of Stay: 9h   CT SURGEON: Dr. Paul Shoemaker    SUBJECTIVE:   Extubated POD0. Left pleural chest tube with large amounts of sanguinous drainage.     On Epi 0.02 and nitroglycerin infusion for hypertension.       MEDICATIONS  Infusions:  EPINEPHrine, Last Rate: 0.02 mcg/kg/min (06/29/24 0600)  lactated Ringer's, Last Rate: 30 mL/hr (06/29/24 0600)  lactated Ringer's, Last Rate: 5 mL/hr (06/29/24 0600)  nitroglycerin, Last Rate: 40 mcg/min (06/29/24 0600)  propofol, Last Rate: Stopped (06/29/24 0100)      Scheduled:  acetaminophen, 650 mg, q6h  aspirin, 81 mg, Daily  aspirin, 81 mg, Once  atorvastatin, 80 mg, Nightly  ceFAZolin, 2 g, q8h  insulin lispro, 0-15 Units, q4h  lidocaine, 1 patch, q24h  pantoprazole, 40 mg, Daily before breakfast   Or  pantoprazole, 40 mg, Daily before breakfast  perflutren protein A microsphere, 0.5 mL, Once in imaging  polyethylene glycol, 17 g, Daily  sennosides-docusate sodium, 2 tablet, BID  sulfur hexafluoride microsphr, 2 mL, Once in imaging  vancomycin, 1,500 mg, q24h      PRN:  alteplase, 2 mg, PRN  calcium gluconate, 1 g, q6h PRN  calcium gluconate, 2 g, q6h PRN  dextrose, 25 g, q15 min PRN   Or  glucagon, 1 mg, q15 min PRN  HYDROmorphone, 0.2 mg, q15 min PRN  magnesium sulfate, 2 g, q6h PRN  magnesium sulfate, 4 g, q6h PRN  naloxone, 0.2 mg, q5 min PRN  oxyCODONE, 10 mg, q4h PRN  oxyCODONE, 5 mg, q4h PRN  oxygen, , Continuous PRN - O2/gases  vancomycin, , Daily PRN        PHYSICAL EXAM:   Visit Vitals  BP 97/55   Pulse (!) 117   Temp 36.4 °C (97.5 °F) (Temporal)   Resp 23   Ht 1.727 m (5' 8\")   Wt 67.8 kg (149 lb 7.6 oz)   SpO2 98%   BMI 22.73 kg/m²   Smoking Status Former   BSA 1.8 m²     Wt Readings from Last 5 Encounters:   06/28/24 67.8 kg (149 lb 7.6 oz)   06/26/24 66.2 kg (146 lb)     INTAKE/OUTPUT:  I/O last 3 completed shifts:  In: 2630 (38.8 mL/kg) [I.V.:675.5 (10 " mL/kg); Blood:455; IV Piggyback:1499.5]  Out: 2351 (34.7 mL/kg) [Urine:1826 (0.7 mL/kg/hr); Chest Tube:525]  Weight: 67.8 kg        LDA:  CVC 06/28/24 Double lumen Right Internal jugular (Active)   Placement Date/Time: 06/28/24 (c) 1633   Hand Hygiene Performed Prior to CVC Insertion: Yes  Site Prep: Chlorhexidine   Site Prep Agent has Completely Dried Before Insertion: Yes  All 5 Sterile Barriers Used (Gloves, Gown, Cap, Mask, Large Sterile Nathalia...   Number of days: 0       Arterial Line 06/28/24 Right Brachial (Active)   Placement Date/Time: 06/28/24 (c) 1601   Size: 20 G  Orientation: Right  Location: Brachial  Securement Method: Transparent dressing  Patient Tolerance: Tolerated well   Number of days: 0       Urethral Catheter Double-lumen;Non-latex;Temperature probe 14 Fr. (Active)   Placement Date/Time: 06/28/24 1615   Placed by: CHYNA Melvin SA  Hand Hygiene Completed: Yes  Catheter Type: Double-lumen;Non-latex;Temperature probe  Tube Size (Fr.): 14 Fr.  Catheter Balloon Size: 5 mL  Urine Returned: Yes   Number of days: 0       Chest Tube 1 Left (Active)   Placement Date/Time: 06/28/24 2231   Tube Number: 1  Chest Tube Orientation: Left  Chest Tube Drainage System: Suction   Number of days: 0         Vent settings:  Vent Mode: Volume control/assist control  S RR:  [16] 16  S VT:  [550 mL] 550 mL  PEEP/CPAP (cm H2O):  [8 cm H20] 8 cm H20  MAP (cm H2O):  [11] 11    Physical Exam:   PHYSICAL EXAM:  - GENERAL: No acute distress. Well-nourished.  - NEUROLOGIC: No focal neurological deficits. Alert and oriented.  [place]  - LUNGS: Diminished bases. Left pleural chest tube with large amounts of sanguinous drainage, drainage saturating dressing. 3L NC.   - CARDIOVASCULAR: Regular rate and rhythm.  - ABDOMEN: Soft, non-tender and non-distended.   - : Clear, yellow urine in velázquez.   - EXTREMITIES: No edema. Non-tender.?  - SKIN: No rashes or lesions. Warm.  - PSYCHIATRIC: Calm and cooperative.       Invasive Hemodynamics:     Most Recent Range Past 24hrs   BP (Art) 108/69 Arterial Line BP 1  Min: 93/58  Max: 165/93   MAP(Art) 84 mmHg Arterial Line MAP 1 (mmHg)   Min: 70 mmHg  Max: 114 mmHg       Daily Risk Screen:  Hemodynamic monitoring  critically ill patient who need accurate urinary output measurements    Assessment/Plan   Felice Almonte is a 69 year old male with PMH of NIDDM, HTN presented as transfer from [facility] 6/26 for critical LM disease. He presented for outpatient [facility] with the complaint of chest pain, on and off, for about 10 months which has worsened. He presents to CTICU s/p CABGx2 (LIMA-LAD, LIMA-RA, RA-OM) with Dr. Paul Shoemaker on 6/28.     Plan:  NEURO:  History of [place].  Acute post operative pain.  Cam negative.  -->  - Serial neuro and pain assessments   - Scheduled Tylenol   - PRN oxycodone  - PRN dilaudid for pain   - PT Consult, OOB to chair as tolerated, chair position if not tolerated   - CAM ICU score qshift  - Sleep/wake cycle hygiene     CV:  Patient has a history of HTN, newly diagnosed CAD and HFrEF. Now s/p MIDCAB CABGx2 (LIMA-LAD, LIMA-RA, RA-OM) with Dr. Paul Shoemaker on 6/28. Pre/Post EF: 30-35%/ post unknown.  Arrived to CTICU on Epi 0.02, Levo 0.03, weaned off and required nitroglycerin overnight for hypertension and radial graft. No epicardial wires. ST 110s.  Lactate peak at 6.9 overnight, downtrending.  -->  - Maintain goal MAP 65-85  - Check VBG, ScVo2 74 -> D/C Epi  - Wean nitro for MAP < 90, PRN IV hydralazine 5 mg q4h  - Start metoprolol 25 mg bid  - Start isosorbide 10 mg TID  - Volume resuscitate as clinically indicated  - Start aspirin 81 mg daily  - If CABG is 2/2 STEMI/unstable angina, will receive Plavix POD2  - Start statin  - Hold home metoprolol     PULM:  No history of pulmonary disease.  Extubated POD 0.  Chest tubes L pleural with large sanguinous drainage this morning (4 hours/600 ml). CXR with post operative atelectasis and low lung volumes. 3L NC. -->  - Repeat CXR  - Wean FiO2  maintaining SpO2 >92%.   - IS q1h and OOB to chair  - Chest tubes to wall suction     GI:  No hx of GERD. KUB with large gastric bubble and large stool burden. Passed swallow evaluation.  No post op BM.  -->  - Regular Diet  - Colace/senna BID and miralax BID  - Suppository      : No history of renal disease, baseline creatinine 0.87. Creatinine stable post-op. Velázquez in place and making adequate UOP. Last 24 hours net. -->  - Continue velázquez catheter for strict I/Os.  - Volume resuscitate as needed  - RFP as clinically indicated  - Replete electrolytes per CTICU protocol     ENDO:  PMH of prediabetes. A1c: 5.7. -->  - Maintain BG <180  - SSI#3 AC     HEME:  Acute blood loss anemia and thrombocytopenia.-->    - Monitor drain output volume and characteristics  - CBC daily, Repeat after RBC  - Continue aspirin daily  - If CABG is 2/2 STEMI/unstable angina, will receive Plavix POD2  - Holding SQH with large chest tube output  - SCDs for DVT prophylaxis.  - Last type and screen: 6/27  - 1u RBC for Hgb 7.7 after chest tube output     ID:  Afebrile, no current indications of infection. MRSA: Negative. -->  - Trend temp q4h  - Periop cefazolin x 48hrs  - Send MRSA     Skin:  No active skin issues.  - preventative Mepilex dressings in place on sacrum and heels  - change preventative Mepilex weekly or more frequently as indicated (when moist/soiled)   - every shift skin assessment per nursing and weekly ICU skin rounds  - moisture barrier to be applied with dorota care  - active skin problems addressed with nursing on daily rounds     Proph:  SCDs  Holding SUBQ     G:  Line  Right IJ MAC w [place] placed 6/28  Left brachial a-line placed 6/28  Velázquez 6/28       Code status: Full Code      I personally spent 45 minutes of critical care time directly and personally managing the patient exclusive of separately billable procedures.     A,B,C,D,E,F,G: reviewed     Dispo: CTICU care for now.    CTICU TEAM PHONE [phone]

## 2024-06-29 NOTE — CONSULTS
Vancomycin Dosing by Pharmacy- INITIAL    Felice Almonte is a 69 y.o. year old male who Pharmacy has been consulted for vancomycin dosing for surgical ppx.    Renal function is currently stable.    Visit Vitals  BP 97/55   Pulse 92   Temp 36.3 °C (97.3 °F) (Temporal)   Resp 16        Lab Results   Component Value Date    CREATININE 1.06 2024    CREATININE 0.99 2024    CREATININE 0.98 2024    CREATININE 1.02 2024        Patient weight is as follows:   Vitals:    24 0400   Weight: 67.8 kg (149 lb 7.6 oz)       Cultures:  No results found for the encounter in last 14 days.        I/O last 3 completed shifts:  In: 340 (5 mL/kg) [I.V.:340 (5 mL/kg)]  Out: 1441 (21.3 mL/kg) [Urine:1441 (0.6 mL/kg/hr)]  Weight: 67.8 kg   I/O during current shift:  I/O this shift:  In: 1345.7 [I.V.:124.8; Blood:455; IV Piggyback:765.9]  Out: 730 [Urine:545; Chest Tube:185]    Temp (24hrs), Av °C (96.8 °F), Min:35.6 °C (96.1 °F), Max:36.3 °C (97.3 °F)         Assessment/Plan     Patient only to get 48 hours of vanco dosing. 1500 mg Q24 for two doses. No vanco draw at this time. Order set to discontinue tomorrow night.      Lalo Holt, PharmD

## 2024-06-29 NOTE — ANESTHESIA POSTPROCEDURE EVALUATION
Patient: Felice Almonte    Procedure Summary       Date: 06/28/24 Room / Location: [facility] OR 18 / [facility] OR    Anesthesia Start: 1547 Anesthesia Stop: 2249    Procedure: pump assist MIDCAB x 2 LIMA-LAD and radial-OM  left endovascular radial harvest, left femoral cutdown and cannulation (Chest) Diagnosis:       Coronary artery disease (CAD) excluded      (Coronary artery disease (CAD) excluded [Z03.89])    Surgeons: Jose De Jesus Shoemaker MD Responsible Provider: Sergei Alexis MD    Anesthesia Type: general ASA Status: 4            Anesthesia Type: general    Vitals Value Taken Time   /79 06/28/24 2249   Temp 36 06/28/24 2249   Pulse 93 06/28/24 2231   Resp 16 06/28/24 2249   SpO2 100 06/28/24 2249       Anesthesia Post Evaluation    Patient location during evaluation: ICU  Patient participation: complete - patient cannot participate  Level of consciousness: sedated  Pain management: adequate  Airway patency: patent  Cardiovascular status: acceptable  Respiratory status: acceptable and ETT  Hydration status: acceptable  Postoperative Nausea and Vomiting: none        No notable events documented.

## 2024-06-29 NOTE — PROGRESS NOTES
"Felice Almonte is a 69 y.o. male on day 3 of admission presenting with Coronary artery disease involving native coronary artery of native heart with unstable angina pectoris (Multi).    Surgeon: Paul Shoemaker  DOS: June 28, 2024  Procedure: On pump CABGx2 (LIMA-LAD, LIMA-RA, RA-OM)     EF: 40%   Airway: Grade IIa - partial view of glottis     Full Code    Subjective   Patient discussed in morning handover, patient examined and chart reviewed. Meds, labs and radiology reviewed during rapid and full interdisciplinary rounds this morning.    HPI: Patient underwent an outpatient LHC for intermittent chest pain for the previous 10 months. Subsequently identified to have critical LM disease; transferred from [facility] on 6/26 for surgical revascularization.    PMH:  CAD  NIDDM  HTN    Overnight: Oozy on arrival to the ICU; CT of 300 when mobilization this morning; hemodynamically stable and output seems to lessened there afterwards       Objective     Physical Exam  Eyes:      Pupils: Pupils are equal, round, and reactive to light.   Cardiovascular:      Rate and Rhythm: Normal rate and regular rhythm.   Pulmonary:      Effort: Pulmonary effort is normal.      Breath sounds: Decreased breath sounds (to bases bilaterally) present.   Abdominal:      General: Abdomen is protuberant.      Palpations: Abdomen is soft.      Tenderness: There is no abdominal tenderness. There is no guarding or rebound.   Genitourinary:     Comments: Rhoades in situ: clear urine  Neurological:      Mental Status: He is alert.      Comments: CAM-ICU negative  RASS 0    Psychiatric:         Behavior: Behavior is cooperative.         Last Recorded Vitals  Blood pressure 97/55, pulse (!) 117, temperature 36.4 °C (97.5 °F), temperature source Temporal, resp. rate 23, height 1.727 m (5' 8\"), weight 67.8 kg (149 lb 7.6 oz), SpO2 98%.  Intake/Output last 3 Shifts:  I/O last 3 completed shifts:  In: 2630 (38.8 mL/kg) [I.V.:675.5 (10 mL/kg); Blood:455; IV " Piggyback:1499.5]  Out: 2351 (34.7 mL/kg) [Urine:1826 (0.7 mL/kg/hr); Chest Tube:525]  Weight: 67.8 kg      Assessment/Plan   Principal Problem:    Coronary artery disease involving native coronary artery of native heart with unstable angina pectoris (Multi)  Active Problems:    Prediabetes    Ischemic cardiomyopathy    Angina pectoris (CMS-HCC)    ICU Liberation Bundle:  A-Analgesia: Tylenol and opioids at lowest effective dose  B-SBT: passed and extubated  C-RASS Target: 0  D-CAM: negative  E-Mobilization Plan: OOBTC and ambulate today  F - Family Last Update: present during rounds this morning; all question appeared to be answered adequately    Daily Checklist:  HOB > 30 degrees: extubated  Feeding: p.o. intake  Thromboprophylaxis: Heparin and SCDs  Ulcer Prophylaxis:p.o. intake  Glucose Control (Target ): < 180 achieved; no hypoglycemia  Line to removed: Will re-assess for line removal later today  Bowel Care: Bowel regime ordered  De-escalation of Antibiotics: Day 1 of 2 of routine antibiotic prophylaxis    Plan:  ASA, statin and beta-blocker to start today  Nitrates for radial spasm prophylaxis  Will mobilize today and re-assess if appropriate for the reyes    I spent 42 minutes in the professional and overall care of this patient.    DISPO: CTICU    This critically ill patient continues to be at-risk for clinically significant deterioration / failure due to the above mentioned dysfunctional, unstable organ systems.  I have personally identified and managed all complex critical care issues to prevent aforementioned clinical deterioration.  Critical care time is spent at bedside and/or the immediate area and has included, but is not limited to, the review of diagnostic tests, labs, radiographs, serial assessments of hemodynamics, respiratory status, ventilatory management, and family updates.  Time spent in procedures and teaching are reported separately.    Ander Arango MD      The patient is a 25y Female complaining of abdominal pain.

## 2024-06-29 NOTE — H&P
CTICU History & Physical    Subjective   HPI:  69 year old male with PMH of NIDDM, HTN presented as transfer from [facility] 6/26 for critical LM disease. He presented for outpatient [facility] with the complaint of chest pain, on and off, for about 10 months which has worsened. He presents to CTICU s/p    Cardiac Testing:     TTE:  6/27/24  PHYSICIAN INTERPRETATION:  Left Ventricle: Left ventricular ejection fraction is moderately decreased, by visual estimate at 40%. There is global hypokinesis of the left ventricle with minor regional variations. The left ventricular cavity size is normal. Spectral Doppler shows an impaired relaxation pattern of left ventricular diastolic filling.  LV Wall Scoring:  The basal and mid anterior wall and basal and mid anterior septum are  hypokinetic.     Left Atrium: The left atrium is normal in size.  Right Ventricle: The right ventricle is normal in size. There is normal right ventricular global systolic function.  Right Atrium: The right atrium is normal in size.  Aortic Valve: The aortic valve is probably trileaflet. There is no evidence of aortic valve regurgitation. The peak instantaneous gradient of the aortic valve is 3.9 mmHg.  Mitral Valve: The mitral valve is normal in structure. There is trace mitral valve regurgitation.  Tricuspid Valve: The tricuspid valve is structurally normal. There is trace tricuspid regurgitation.  Pulmonic Valve: The pulmonic valve is not well visualized. There is physiologic pulmonic valve regurgitation.  Pericardium: There is a trivial pericardial effusion.  Aorta: The aortic root is normal.  Systemic Veins: The inferior vena cava appears to be of normal size. There is IVC inspiratory collapse greater than 50%.  In comparison to the previous echocardiogram(s): There are no prior studies on this patient for comparison purposes.        CONCLUSIONS:   1. Basal and mid anterior wall and basal and mid anterior septum are abnormal.   2. Left ventricular  ejection fraction is moderately decreased, by visual estimate at 40%.   3. There is global hypokinesis of the left ventricle with minor regional variations.   4. Spectral Doppler shows an impaired relaxation pattern of left ventricular diastolic filling.   5. There is normal right ventricular global systolic function.       [facility]:  6/26/24  Coronary Angiography:  The coronary circulation is right dominant.     Left Main Coronary Artery:  The left main coronary artery is a medium-sized caliber vessel. The left main arises normally from the left coronary sinus of Valsalva. The left main coronary artery showed severe ostial to proximal atherosclerotic disease. The ostial and proximal left main coronary artery showed 95% stenosis.  This lesion was focal and moderately calcified.     Left Anterior Descending Coronary Artery Distribution:  The left anterior descending coronary artery is a large caliber vessel. The LAD arises normally from the left main coronary artery. The LAD demonstrated diffuse moderate atherosclerotic disease.     Circumflex Coronary Artery Distribution:  The circumflex coronary artery is a large caliber vessel. The circumflex arises normally from the left main coronary artery. The circumflex revealed no significant disease or stenosis greater than 30%.     Right Coronary Artery Distribution:     The right coronary artery is a large caliber vessel. The RCA arises normally from the right sinus of Valsalva. The RCA showed no significant disease or stenosis greater than 30% and mild proximal atherosclerotic disease.     Coronary Lesion Summary:  Vessel      Stenosis     Vessel Segment  Left Main 95% stenosis ostial and proximal        Subclavian Artery Findings:     Left Subclavian Artery:  The left subclavian artery is a large caliber vessel. The left subclavian artery revealed no evidence of significant disease. Large left subclavian artery gives origin to large LIMA, a suitable graft conduit.      Procedure/Surgeon: MIDCABx2 LIMA-LAD and radial to OM5, Dr. Paul Shoemaker       OR Course/Issues: Patient did not tolerate robotic portion of case, transition to CPB     CPB time: 87minutes    Cross clamp time: N/A  Circ arrest time: N/A   Echo Pre/Post: EF 30-35%   Chest Tubes/Drains: L pleural drain  Temporary wires location/setting: N/A       Fluids  Crystalloid: 2.5 L  Colloid: 500mL   Cellsaver: 500 mL   Products: 10pk platelets   EBL: 250mL   UOP: 365mL      Anesthesia  Intubation: Easy, NIA grade IIa  Intravenous Access: 16g RH, RIJ MAC and mini mac  Regional anesthesia: N/A  Benzodiazepine dose/last administration: 2mg midazolam total  Opioid dose/last administration: 1200mcg fentanyl total  TOF/ reversal given: No  Antibiotic time: Ancef 1630, 2030, Vancomycin   Temperature on admission to ICU: 36    Past Medical History:   Diagnosis Date    Hyperlipidemia      Past Surgical History:   Procedure Laterality Date    CARDIAC CATHETERIZATION N/A 6/26/2024    Procedure: Left Heart Cath with Coronary Angiography and LV;  Surgeon: Dagoberto Washburn MD;  Location: [facility] Cardiac Cath Lab;  Service: Cardiovascular;  Laterality: N/A;  6/26/24--12:30 pm for [facility] [id number]--angina I20.9 and CMO I42.9  [facility]  (self [facility]); no PA required     Medications Prior to Admission   Medication Sig Dispense Refill Last Dose    aspirin 81 mg EC tablet Take 1 tablet (81 mg) by mouth once daily.   6/26/2024    metoprolol succinate XL (Toprol-XL) 25 mg 24 hr tablet Take 1 tablet (25 mg) by mouth once daily. Do not crush or chew.   6/26/2024    nitroglycerin (Nitrostat) 0.4 mg SL tablet Place 1 tablet (0.4 mg) under the tongue every 5 minutes if needed for chest pain.   Unknown    rosuvastatin (Crestor) 10 mg tablet Take 1 tablet (10 mg) by mouth once daily.   Unknown     Patient has no known allergies.  Social History     Tobacco Use    Smoking status: Former     Types: Cigarettes   Substance Use Topics    Alcohol use: Never      No family history on file.    Review of Systems:  BRANDI, intubated and sedated    Objective   Vitals:  Most Recent:  Vitals:    06/28/24 1500   BP: 97/55   Pulse: 68   Resp: 25   Temp:    SpO2: 93%       24hr Min/Max:  Temp  Min: 35.6 °C (96.1 °F)  Max: 36.1 °C (97 °F)  Pulse  Min: 58  Max: 75  BP  Min: 96/62  Max: 134/53  Resp  Min: 15  Max: 25  SpO2  Min: 93 %  Max: 98 %    I/O:  I/O last 2 completed shifts:  In: 245 (3.6 mL/kg) [I.V.:245 (3.6 mL/kg)]  Out: 541 (8 mL/kg) [Urine:541 (0.3 mL/kg/hr)]  Weight: 67.8 kg     LDA:       Physical Exam:   - CONSTITUTION: Elderly male, intubated and sedated.  - NEUROLOGIC: Sedated, unable to assess  - CARDIOVASCULAR: NSR  - RESPIRATORY: Mechanical breath sounds   - GI: Abdomen soft, OGT in place  - : Rhoades with clear urine   - EXTREMITIES: No peripheral edema  - SKIN: Dry, warm  - PSYCHIATRIC: unable to assess     Lab Review:  Results from last 7 days   Lab Units 06/28/24  0355   WBC AUTO x10*3/uL 7.4   HEMOGLOBIN g/dL 13.3*   HEMATOCRIT % 38.6*   PLATELETS AUTO x10*3/uL 143*     Results from last 7 days   Lab Units 06/28/24  0355   SODIUM mmol/L 138   POTASSIUM mmol/L 4.6   CHLORIDE mmol/L 103   CO2 mmol/L 26   BUN mg/dL 22   CREATININE mg/dL 0.99   CALCIUM mg/dL 9.0   PROTEIN TOTAL g/dL 6.9   BILIRUBIN TOTAL mg/dL 0.4   ALK PHOS U/L 68   ALT U/L 12   AST U/L 14   GLUCOSE mg/dL 111*     Results from last 7 days   Lab Units 06/28/24  0355   MAGNESIUM mg/dL 2.09     Results from last 7 days   Lab Units 06/28/24  2124   POCT PH, ARTERIAL pH 7.32*   POCT PCO2, ARTERIAL mm Hg 40   POCT PO2, ARTERIAL mm Hg 389*   POCT HCO3 CALCULATED, ARTERIAL mmol/L 20.6*   POCT BASE EXCESS, ARTERIAL mmol/L -5.1*       Most recent labs and imaging reviewed.    Daily Risk Screen  Intubated: Wean to extubate  Central line: Indicated for hemodynamic monitoring  Rhoades: Indicated for accurate intake and output    Assessment/Plan     Assessment:     Plan:  NEURO:  No PMH.  Patient is intubated and  sedated on propofol infusion. Acute post operative pain.   -->  - Serial neuro and pain assessments   - Continue propofol until NMB reversal, then daily sedation vacation at minimum   - NMB reversal when normothermic and hemodynamically stable.    - Scheduled Tylenol   - PRN oxycodone  - PRN dilaudid for pain   - PT Consult, OOB to chair as tolerated, chair position if not tolerated   - CAM ICU score qshift  - Sleep/wake cycle hygiene     CV:  Patient has a history of HTN, and new severe LM disease. Is now status post MIDCAB Pre/Post EF: 30-35% Arrived to CTICU on Epi 0.02, Levo 0.03.Patient does not have epicardial wires.-->  - Maintain goal MAP 65-85  - Mixed venous and CI Q4H  - Volume resuscitate as clinically indicated  - If CABG is 2/2 STEMI/unstable angina, will receive Plavix POD2  - Start statin  - Hold home metoprolol     PULM:  No history of pulmonary disease.  Currently intubated on ventilator. Chest tubes L pleural to suction.-->  - F/u post op CXR  - Once reversed, wean ventilator settings towards CPAP & extubation   - Wean FiO2 maintaining SpO2 >92%.   - IS q1h and OOB to chair when extubated  - Chest tubes to wall suction.    GI:  No hx of GERD. OG in place.-->  - Continue PPI until extubated  - NPO, will perform bedside swallow eval post extubation   - Colace/senna BID and miralax BID     : No history of renal disease, baseline creatinine 0.87. Creatinine stable post-op. Velázquez in place and making adequate UOP. -->  - Continue velázquez catheter for strict I/Os.  - Goal UOP 0.5ml/kg/hr  - RFP as clinically indicated  - Replete electrolytes per CTICU protocol     ENDO:  PMH of prediabetes. A1c: 5.7. -->  - Maintain BG <180, insulin per CTICU protocol     HEME:  Acute blood loss anemia and thrombocytopenia.-->    - Monitor drain output volume and characteristics  - CBC, coags, and fibrinogen post op and as clinically indicated  - Start ASA 6hrs post-op for CABG  - If CABG is 2/2 STEMI/unstable angina,  will receive Plavix POD2  - SQH tomorrow   - SCDs for DVT prophylaxis.  - Last type and screen: 6/27     ID:  Afebrile, no current indications of infection. MRSA pending.-->  - Trend temp q4h  - Periop cefazolin and vancomycin x 48hrs     Skin:  No active skin issues.  - preventative Mepilex dressings in place on sacrum and heels  - change preventative Mepilex weekly or more frequently as indicated (when moist/soiled)   - every shift skin assessment per nursing and weekly ICU skin rounds  - moisture barrier to be applied with dorota care  - active skin problems addressed with nursing on daily rounds     Proph:  SCDs  PPI     G:  Line  Right IJ MAC w Minimac placed 6/28  Left brachial a-line placed 6/28    F: Family: will update at bedside postoperatively.     A,B,C,D,E,F,G: reviewed    Dispo: CTICU care for now.    CTICU TEAM PHONE [id number]

## 2024-06-29 NOTE — PROGRESS NOTES
Vancomycin Dosing by Pharmacy- Cessation of Therapy    Consult to pharmacy for vancomycin dosing has been discontinued by the prescriber, pharmacy will sign off at this time.    Please call pharmacy if there are further questions or re-enter a consult if vancomycin is resumed.     Vancomycin set to finish tomorrow after 48 hours has been completed as per order.     Darline Arechiga, PharmD

## 2024-06-30 ENCOUNTER — APPOINTMENT (OUTPATIENT)
Dept: RADIOLOGY | Facility: HOSPITAL | Age: 69
DRG: 235 | End: 2024-06-30
Payer: COMMERCIAL

## 2024-06-30 VITALS
TEMPERATURE: 97.5 F | HEART RATE: 107 BPM | RESPIRATION RATE: 18 BRPM | BODY MASS INDEX: 22.69 KG/M2 | OXYGEN SATURATION: 93 % | HEIGHT: 68 IN | DIASTOLIC BLOOD PRESSURE: 67 MMHG | SYSTOLIC BLOOD PRESSURE: 103 MMHG | WEIGHT: 149.69 LBS

## 2024-06-30 PROBLEM — G89.18 POSTOPERATIVE PAIN: Status: ACTIVE | Noted: 2024-06-30

## 2024-06-30 PROBLEM — Z98.890 PONV (POSTOPERATIVE NAUSEA AND VOMITING): Status: ACTIVE | Noted: 2024-06-30

## 2024-06-30 PROBLEM — Z95.1 S/P CABG X 2: Status: ACTIVE | Noted: 2024-06-30

## 2024-06-30 PROBLEM — R11.2 PONV (POSTOPERATIVE NAUSEA AND VOMITING): Status: ACTIVE | Noted: 2024-06-30

## 2024-06-30 LAB
ALBUMIN SERPL BCP-MCNC: 4.1 G/DL (ref 3.4–5)
ANION GAP BLDA CALCULATED.4IONS-SCNC: 8 MMO/L (ref 10–25)
ANION GAP SERPL CALC-SCNC: 10 MMOL/L (ref 10–20)
BASE EXCESS BLDA CALC-SCNC: 3.4 MMOL/L (ref -2–3)
BODY TEMPERATURE: 37 DEGREES CELSIUS
BUN SERPL-MCNC: 15 MG/DL (ref 6–23)
CA-I BLD-SCNC: 1.16 MMOL/L (ref 1.1–1.33)
CA-I BLDA-SCNC: 1.2 MMOL/L (ref 1.1–1.33)
CALCIUM SERPL-MCNC: 8.6 MG/DL (ref 8.6–10.6)
CHLORIDE BLDA-SCNC: 101 MMOL/L (ref 98–107)
CHLORIDE SERPL-SCNC: 100 MMOL/L (ref 98–107)
CO2 SERPL-SCNC: 28 MMOL/L (ref 21–32)
CREAT SERPL-MCNC: 0.86 MG/DL (ref 0.5–1.3)
EGFRCR SERPLBLD CKD-EPI 2021: >90 ML/MIN/1.73M*2
ERYTHROCYTE [DISTWIDTH] IN BLOOD BY AUTOMATED COUNT: 14.6 % (ref 11.5–14.5)
GLUCOSE BLDA-MCNC: 128 MG/DL (ref 74–99)
GLUCOSE SERPL-MCNC: 127 MG/DL (ref 74–99)
HCO3 BLDA-SCNC: 28.5 MMOL/L (ref 22–26)
HCT VFR BLD AUTO: 24.9 % (ref 41–52)
HCT VFR BLD EST: 28 % (ref 41–52)
HGB BLD-MCNC: 8.8 G/DL (ref 13.5–17.5)
HGB BLDA-MCNC: 9.4 G/DL (ref 13.5–17.5)
INHALED O2 CONCENTRATION: 40 %
LACTATE BLDA-SCNC: 1.3 MMOL/L (ref 0.4–2)
MAGNESIUM SERPL-MCNC: 1.95 MG/DL (ref 1.6–2.4)
MCH RBC QN AUTO: 31 PG (ref 26–34)
MCHC RBC AUTO-ENTMCNC: 35.3 G/DL (ref 32–36)
MCV RBC AUTO: 88 FL (ref 80–100)
NRBC BLD-RTO: 0 /100 WBCS (ref 0–0)
OXYHGB MFR BLDA: 96.8 % (ref 94–98)
PCO2 BLDA: 45 MM HG (ref 38–42)
PH BLDA: 7.41 PH (ref 7.38–7.42)
PHOSPHATE SERPL-MCNC: 2.5 MG/DL (ref 2.5–4.9)
PLATELET # BLD AUTO: 117 X10*3/UL (ref 150–450)
PO2 BLDA: 83 MM HG (ref 85–95)
POTASSIUM BLDA-SCNC: 4.1 MMOL/L (ref 3.5–5.3)
POTASSIUM SERPL-SCNC: 3.9 MMOL/L (ref 3.5–5.3)
RBC # BLD AUTO: 2.84 X10*6/UL (ref 4.5–5.9)
SAO2 % BLDA: 99 % (ref 94–100)
SODIUM BLDA-SCNC: 133 MMOL/L (ref 136–145)
SODIUM SERPL-SCNC: 134 MMOL/L (ref 136–145)
WBC # BLD AUTO: 9.3 X10*3/UL (ref 4.4–11.3)

## 2024-06-30 PROCEDURE — 2500000004 HC RX 250 GENERAL PHARMACY W/ HCPCS (ALT 636 FOR OP/ED): Mod: JZ | Performed by: STUDENT IN AN ORGANIZED HEALTH CARE EDUCATION/TRAINING PROGRAM

## 2024-06-30 PROCEDURE — 82330 ASSAY OF CALCIUM: CPT | Performed by: STUDENT IN AN ORGANIZED HEALTH CARE EDUCATION/TRAINING PROGRAM

## 2024-06-30 PROCEDURE — 2500000001 HC RX 250 WO HCPCS SELF ADMINISTERED DRUGS (ALT 637 FOR MEDICARE OP): Performed by: NURSE PRACTITIONER

## 2024-06-30 PROCEDURE — 37799 UNLISTED PX VASCULAR SURGERY: CPT | Performed by: STUDENT IN AN ORGANIZED HEALTH CARE EDUCATION/TRAINING PROGRAM

## 2024-06-30 PROCEDURE — 84132 ASSAY OF SERUM POTASSIUM: CPT | Performed by: STUDENT IN AN ORGANIZED HEALTH CARE EDUCATION/TRAINING PROGRAM

## 2024-06-30 PROCEDURE — 85027 COMPLETE CBC AUTOMATED: CPT | Performed by: STUDENT IN AN ORGANIZED HEALTH CARE EDUCATION/TRAINING PROGRAM

## 2024-06-30 PROCEDURE — 94667 MNPJ CHEST WALL 1ST: CPT

## 2024-06-30 PROCEDURE — 2500000004 HC RX 250 GENERAL PHARMACY W/ HCPCS (ALT 636 FOR OP/ED): Mod: JZ | Performed by: NURSE PRACTITIONER

## 2024-06-30 PROCEDURE — 71045 X-RAY EXAM CHEST 1 VIEW: CPT

## 2024-06-30 PROCEDURE — 99232 SBSQ HOSP IP/OBS MODERATE 35: CPT

## 2024-06-30 PROCEDURE — 1200000002 HC GENERAL ROOM WITH TELEMETRY DAILY

## 2024-06-30 PROCEDURE — 71045 X-RAY EXAM CHEST 1 VIEW: CPT | Performed by: RADIOLOGY

## 2024-06-30 PROCEDURE — 83735 ASSAY OF MAGNESIUM: CPT | Performed by: STUDENT IN AN ORGANIZED HEALTH CARE EDUCATION/TRAINING PROGRAM

## 2024-06-30 PROCEDURE — 2500000001 HC RX 250 WO HCPCS SELF ADMINISTERED DRUGS (ALT 637 FOR MEDICARE OP): Performed by: STUDENT IN AN ORGANIZED HEALTH CARE EDUCATION/TRAINING PROGRAM

## 2024-06-30 PROCEDURE — 2500000001 HC RX 250 WO HCPCS SELF ADMINISTERED DRUGS (ALT 637 FOR MEDICARE OP)

## 2024-06-30 PROCEDURE — 2500000004 HC RX 250 GENERAL PHARMACY W/ HCPCS (ALT 636 FOR OP/ED)

## 2024-06-30 RX ORDER — MULTIVIT-MIN/IRON FUM/FOLIC AC 7.5 MG-4
1 TABLET ORAL DAILY
Status: DISPENSED | OUTPATIENT
Start: 2024-06-30

## 2024-06-30 RX ORDER — OXYCODONE HYDROCHLORIDE 5 MG/1
5 TABLET ORAL EVERY 4 HOURS PRN
Status: ACTIVE | OUTPATIENT
Start: 2024-06-30

## 2024-06-30 RX ORDER — POTASSIUM CHLORIDE 1.5 G/1.58G
40 POWDER, FOR SOLUTION ORAL EVERY 6 HOURS PRN
Status: DISCONTINUED | OUTPATIENT
Start: 2024-06-30 | End: 2024-06-30

## 2024-06-30 RX ORDER — POTASSIUM CHLORIDE 29.8 MG/ML
40 INJECTION INTRAVENOUS EVERY 6 HOURS PRN
Status: DISCONTINUED | OUTPATIENT
Start: 2024-06-30 | End: 2024-06-30

## 2024-06-30 RX ORDER — IRON POLYSACCHARIDE COMPLEX 150 MG
150 CAPSULE ORAL DAILY
Status: DISPENSED | OUTPATIENT
Start: 2024-06-30

## 2024-06-30 RX ORDER — POTASSIUM CHLORIDE 20 MEQ/1
20 TABLET, EXTENDED RELEASE ORAL EVERY 6 HOURS PRN
Status: DISCONTINUED | OUTPATIENT
Start: 2024-06-30 | End: 2024-06-30

## 2024-06-30 RX ORDER — POTASSIUM CHLORIDE 20 MEQ/1
40 TABLET, EXTENDED RELEASE ORAL EVERY 6 HOURS PRN
Status: DISCONTINUED | OUTPATIENT
Start: 2024-06-30 | End: 2024-06-30

## 2024-06-30 RX ORDER — POLYETHYLENE GLYCOL 3350 17 G/17G
17 POWDER, FOR SOLUTION ORAL 3 TIMES DAILY
Status: DISCONTINUED | OUTPATIENT
Start: 2024-06-30 | End: 2024-06-30

## 2024-06-30 RX ORDER — POLYETHYLENE GLYCOL 3350 17 G/17G
17 POWDER, FOR SOLUTION ORAL 2 TIMES DAILY
Status: ACTIVE | OUTPATIENT
Start: 2024-07-01

## 2024-06-30 RX ORDER — POTASSIUM CHLORIDE 1.5 G/1.58G
20 POWDER, FOR SOLUTION ORAL EVERY 6 HOURS PRN
Status: DISCONTINUED | OUTPATIENT
Start: 2024-06-30 | End: 2024-06-30

## 2024-06-30 RX ORDER — INSULIN LISPRO 100 [IU]/ML
0-5 INJECTION, SOLUTION INTRAVENOUS; SUBCUTANEOUS
Status: DISPENSED | OUTPATIENT
Start: 2024-06-30

## 2024-06-30 RX ORDER — LACTULOSE 10 G/15ML
20 SOLUTION ORAL ONCE
Status: COMPLETED | OUTPATIENT
Start: 2024-06-30 | End: 2024-06-30

## 2024-06-30 RX ORDER — METOPROLOL TARTRATE 25 MG/1
37.5 TABLET, FILM COATED ORAL 2 TIMES DAILY
Status: DISPENSED | OUTPATIENT
Start: 2024-06-30

## 2024-06-30 RX ORDER — BISACODYL 10 MG/1
10 SUPPOSITORY RECTAL ONCE
Status: DISPENSED | OUTPATIENT
Start: 2024-06-30

## 2024-06-30 RX ORDER — POTASSIUM CHLORIDE 14.9 MG/ML
20 INJECTION INTRAVENOUS EVERY 6 HOURS PRN
Status: DISCONTINUED | OUTPATIENT
Start: 2024-06-30 | End: 2024-06-30

## 2024-06-30 RX ORDER — METOPROLOL TARTRATE 25 MG/1
25 TABLET, FILM COATED ORAL 2 TIMES DAILY
Status: DISCONTINUED | OUTPATIENT
Start: 2024-06-30 | End: 2024-06-30

## 2024-06-30 RX ORDER — FUROSEMIDE 10 MG/ML
20 INJECTION INTRAMUSCULAR; INTRAVENOUS ONCE
Status: COMPLETED | OUTPATIENT
Start: 2024-06-30 | End: 2024-06-30

## 2024-06-30 RX ORDER — TALC
6 POWDER (GRAM) TOPICAL NIGHTLY
Status: DISPENSED | OUTPATIENT
Start: 2024-06-30

## 2024-06-30 ASSESSMENT — PAIN SCALES - GENERAL
PAINLEVEL_OUTOF10: 0 - NO PAIN
PAINLEVEL_OUTOF10: 0 - NO PAIN
PAINLEVEL_OUTOF10: 2
PAINLEVEL_OUTOF10: 0 - NO PAIN
PAINLEVEL_OUTOF10: 3
PAINLEVEL_OUTOF10: 0 - NO PAIN

## 2024-06-30 ASSESSMENT — PAIN DESCRIPTION - LOCATION: LOCATION: CHEST

## 2024-06-30 ASSESSMENT — PAIN - FUNCTIONAL ASSESSMENT
PAIN_FUNCTIONAL_ASSESSMENT: 0-10

## 2024-06-30 ASSESSMENT — PAIN DESCRIPTION - ORIENTATION: ORIENTATION: LEFT

## 2024-06-30 NOTE — PROGRESS NOTES
"Felice Almonte is a 69 y.o. male on day 4 of admission presenting with Coronary artery disease involving native coronary artery of native heart with unstable angina pectoris (Multi).    Surgeon: Paul Shoemaker  DOS: June 28, 2024  Procedure: On pump CABGx2 (LIMA-LAD, LIMA-RA, RA-OM)     EF: 30-35%   Airway: Grade IIa - partial view of glottis     Full Code    Subjective   Patient discussed in morning handover, patient examined and chart reviewed. Meds, labs and radiology reviewed during rapid and full interdisciplinary rounds this morning.    HPI: Patient underwent an outpatient [facility] for intermittent chest pain for the previous 10 months. Subsequently identified to have critical LM disease; transferred from [facility] on 6/26 for surgical revascularization.    PMH:  CAD  NIDDM  HTN    Overnight: No new issues overnight; pain still required optimization       Objective     Physical Exam  Eyes:      Pupils: Pupils are equal, round, and reactive to light.   Cardiovascular:      Rate and Rhythm: Normal rate and regular rhythm.      Comments: No pacing wires.   Pulmonary:      Effort: Pulmonary effort is normal.      Breath sounds: Decreased breath sounds (to bases bilaterally) present.   Abdominal:      General: Abdomen is protuberant.      Palpations: Abdomen is soft.      Tenderness: There is no abdominal tenderness. There is no guarding or rebound.      Comments: Large gastric bubble on CXR this morning   Genitourinary:     Comments: Rhoades in situ: clear urine  Neurological:      Mental Status: He is alert.      Comments: CAM-ICU negative  RASS 0    Psychiatric:         Behavior: Behavior is cooperative.       Last Recorded Vitals  Blood pressure 116/71, pulse 95, temperature 36.4 °C (97.5 °F), temperature source Temporal, resp. rate 23, height 1.727 m (5' 8\"), weight 67.8 kg (149 lb 7.6 oz), SpO2 96%.  Intake/Output last 3 Shifts:  I/O last 3 completed shifts:  In: 3290.3 (48.5 mL/kg) [P.O.:120; I.V.:891.6 (13.2 mL/kg); " Blood:779.2; IV Piggyback:1499.5]  Out: 4355 (64.2 mL/kg) [Urine:2760 (1.1 mL/kg/hr); Chest Tube:1595]  Weight: 67.8 kg      Assessment/Plan     ICU Liberation Bundle:  A-Analgesia: Tylenol and opioids at lowest effective dose  B-SBT: extubated  C-RASS Target: 0  D-CAM: negative  E-Mobilization Plan: OOBTC and ambulate today  F - Family Last Update: present again during rounds this morning; all questions appeared to be answered adequately    Daily Checklist:  HOB > 30 degrees: extubated  Feeding: p.o. intake  Thromboprophylaxis: Heparin and SCDs  Ulcer Prophylaxis:p.o. intake  Glucose Control (Target ): < 180 achieved; no hypoglycemia  Line to removed: discontinue central and arterial lines; removal Rhoades; leave CT in situ due to drainage  Bowel Care: Bowel regime ordered  De-escalation of Antibiotics: Day 2 of 2 of routine antibiotic prophylaxis    Plan:  ASA, statin and beta-blocker  Nitrates for radial spasm prophylaxis  Start diuresis today; target - 1L  Will continue with mobilization today    I spent 42 minutes in the professional and overall care of this patient.    DISPO: transfer to the reyes    Andre Arango MD

## 2024-06-30 NOTE — SIGNIFICANT EVENT
Rapid Response RN Note    Rapid response RN at bedside for RADAR score 7 due to the following VS: T 36.5 °Celsius; ; RR 19; BP 95/61; SPO2 94%.     Reviewed above VS with bedside RN.  Pt was recently transferred to T3 from CTICU. Bedside RN to monitor pt and reach out to rapid if indicated. No interventions by rapid response team indicated at this time.      Staff to page rapid response for any concerns or acute change in condition/VS. Romulo Cannon RN.

## 2024-06-30 NOTE — PROGRESS NOTES
"CTICU Progress Note  Felice Almonte/[id number]    Admit Date: 6/26/2024  Hospital Length of Stay: 4   ICU Length of Stay: 1d 8h   CT SURGEON: Dr. Paul Shoemaker    SUBJECTIVE:   No acute events overnight.       MEDICATIONS  Infusions:  lactated Ringer's, Last Rate: Stopped (06/29/24 1300)  lactated Ringer's, Last Rate: 5 mL/hr (06/30/24 0600)      Scheduled:  acetaminophen, 650 mg, q6h  aspirin, 81 mg, Daily  atorvastatin, 80 mg, Nightly  bisacodyl, 10 mg, Once  ceFAZolin, 2 g, q8h  [Held by provider] heparin (porcine), 5,000 Units, q8h  insulin lispro, 0-15 Units, TID AC  isosorbide dinitrate, 10 mg, TID  lidocaine, 1 patch, q24h  metoprolol tartrate, 25 mg, BID  perflutren protein A microsphere, 0.5 mL, Once in imaging  polyethylene glycol, 17 g, BID  sennosides-docusate sodium, 2 tablet, BID  sulfur hexafluoride microsphr, 2 mL, Once in imaging      PRN:  alteplase, 2 mg, PRN  calcium gluconate, 1 g, q6h PRN  calcium gluconate, 2 g, q6h PRN  dextrose, 12.5 g, q15 min PRN  dextrose, 25 g, q15 min PRN  glucagon, 1 mg, q15 min PRN  glucagon, 1 mg, q15 min PRN  hydrALAZINE, 5 mg, q4h PRN  HYDROmorphone, 0.2 mg, q2h PRN  magnesium sulfate, 2 g, q6h PRN  magnesium sulfate, 4 g, q6h PRN  naloxone, 0.2 mg, q5 min PRN  ondansetron ODT, 4 mg, q8h PRN   Or  ondansetron, 4 mg, q8h PRN  oxyCODONE, 10 mg, q4h PRN  oxyCODONE, 5 mg, q4h PRN  oxygen, , Continuous PRN - O2/gases  potassium chloride CR, 20 mEq, q6h PRN   Or  potassium chloride, 20 mEq, q6h PRN  potassium chloride CR, 40 mEq, q6h PRN   Or  potassium chloride, 40 mEq, q6h PRN  potassium chloride, 20 mEq, q6h PRN  potassium chloride, 40 mEq, q6h PRN        PHYSICAL EXAM:   Visit Vitals  /71   Pulse 93   Temp 36.4 °C (97.5 °F) (Temporal)   Resp 20   Ht 1.727 m (5' 8\")   Wt 67.8 kg (149 lb 7.6 oz)   SpO2 96%   BMI 22.73 kg/m²   Smoking Status Former   BSA 1.8 m²     Wt Readings from Last 5 Encounters:   06/28/24 67.8 kg (149 lb 7.6 oz)   06/26/24 66.2 kg (146 lb) "     INTAKE/OUTPUT:  I/O last 3 completed shifts:  In: 3290.3 (48.5 mL/kg) [P.O.:120; I.V.:891.6 (13.2 mL/kg); Blood:779.2; IV Piggyback:1499.5]  Out: 4355 (64.2 mL/kg) [Urine:2760 (1.1 mL/kg/hr); Chest Tube:1595]  Weight: 67.8 kg        LDA:  CVC 06/28/24 Double lumen Right Internal jugular (Active)   Placement Date/Time: 06/28/24 (c) 1633   Hand Hygiene Performed Prior to CVC Insertion: Yes  Site Prep: Chlorhexidine   Site Prep Agent has Completely Dried Before Insertion: Yes  All 5 Sterile Barriers Used (Gloves, Gown, Cap, Mask, Large Sterile Nathalia...   Number of days: 0       Arterial Line 06/28/24 Right Brachial (Active)   Placement Date/Time: 06/28/24 (c) 1601   Size: 20 G  Orientation: Right  Location: Brachial  Securement Method: Transparent dressing  Patient Tolerance: Tolerated well   Number of days: 0       Urethral Catheter Double-lumen;Non-latex;Temperature probe 14 Fr. (Active)   Placement Date/Time: 06/28/24 1615   Placed by: CHYNA Melvin SA  Hand Hygiene Completed: Yes  Catheter Type: Double-lumen;Non-latex;Temperature probe  Tube Size (Fr.): 14 Fr.  Catheter Balloon Size: 5 mL  Urine Returned: Yes   Number of days: 0       Chest Tube 1 Left (Active)   Placement Date/Time: 06/28/24 2231   Tube Number: 1  Chest Tube Orientation: Left  Chest Tube Drainage System: Suction   Number of days: 0         Vent settings:       Physical Exam:   PHYSICAL EXAM:  - GENERAL: No acute distress. Well-nourished.  - NEUROLOGIC: No focal neurological deficits. Alert and oriented.  [facility]  - LUNGS: Diminished bases, poor inspiratory volume. Left pleural chest tube with serosanguinous drainage. 2L NC.   - CARDIOVASCULAR: Regular rate and rhythm.  - ABDOMEN: Soft, non-tender and slightly distended.   - : Clear, yellow urine in velázquez.   - EXTREMITIES: No edema. Non-tender.?  - SKIN: No rashes or lesions. Warm. Dressing dry and intact on left thoracotomy incision.   - PSYCHIATRIC: Calm and cooperative.       Invasive  Hemodynamics:    Most Recent Range Past 24hrs   BP (Art) 110/79 Arterial Line BP 1  Min: 85/61  Max: 127/72   MAP(Art) 93 mmHg Arterial Line MAP 1 (mmHg)   Min: 68 mmHg  Max: 118 mmHg       Daily Risk Screen:  Hemodynamic monitoring  critically ill patient who need accurate urinary output measurements    Assessment/Plan   Felice Almonte is a 69 year old male with PMH of NIDDM, HTN presented as transfer from [facility] 6/26 for critical LM disease. He presented for outpatient [facility] with the complaint of chest pain, on and off, for about 10 months which has worsened. He presents to CTICU s/p CABGx2 (LIMA-LAD, LIMA-RA, RA-OM) with Dr. Paul Shoemaker on 6/28.     Plan:  NEURO:  History of [place].  Acute post operative pain, endorsing some left chest incision pain.  Cam negative. OOB to chair.   -->  - Serial neuro and pain assessments   - Scheduled Tylenol   - PRN oxycodone  - PRN dilaudid for pain   - PT Consult, OOB to chair  - CAM ICU score qshift  - Sleep/wake cycle hygiene  - Ambulation today     CV:  Patient has a history of HTN, newly diagnosed CAD and HFrEF. Now s/p MIDCAB CABGx2 (LIMA-LAD, LIMA-RA, RA-OM) with Dr. Paul Shoemaker on 6/28. Pre/Post EF: 30-35%/ post unknown. No epicardial wires. SR/ST.    -->  - Maintain goal MAP 65-85  - PRN IV hydralazine 5 mg q4h  - Increase metoprolol 37.5 mg bid  - Continue isosorbide 10 mg TID  - Continue aspirin 81 mg daily  - Continue atorvastatin 80 mg   - If CABG is 2/2 STEMI/unstable angina, will receive Plavix POD2  - See  for diuresis      PULM:  No history of pulmonary disease.  Extubated POD 0.  Chest tubes L pleural with decreasing serosanguinous drainage. CXR with post operative atelectasis, low lung volumes, and bilateral pleural effusions. 2L NC, IS < 250 ml. . -->  - Daily CXR  - Wean FiO2 maintaining SpO2 >92%.   - IS q1h and OOB to chair  - Chest tubes to wall suction  - Start bronchial hygiene TID      GI:  No hx of GERD. KUB with large gastric bubble and large stool burden.  Passed swallow evaluation.  No post op BM.  Episodes of emesis yesterday. -->  - Regular Diet  - Colace/senna BID and miralax BID  - Suppository, Enema, Lactulose 20 mg     : No history of renal disease, baseline creatinine 0.87. Creatinine stable post-op. Velázquez in place and making adequate UOP. Last 24 hours -1.4L  -->  - Remove velázquez catheter  - RFP daily  - Replete electrolytes per CTICU protocol  - Goal - 1L -> Lasix 20 mg      ENDO:  PMH of prediabetes. A1c: 5.7. -->  - Maintain BG <180  - SSI#1 AC     HEME:  Acute blood loss anemia and thrombocytopenia, stable.  Received 1u RBC yesterday for sanguinous chest tube drainage. -->    - Monitor drain output volume and characteristics  - CBC daily  - Continue aspirin daily  - If CABG is 2/2 STEMI/unstable angina, will receive Plavix POD2  - resume SQH   - SCDs for DVT prophylaxis.  - Last type and screen: 6/27 -> Send     ID:  Afebrile, no current indications of infection. MRSA: Negative. -->  - Trend temp q4h  - Periop cefazolin x 48hrs     Skin:  No active skin issues.  - preventative Mepilex dressings in place on sacrum and heels  - change preventative Mepilex weekly or more frequently as indicated (when moist/soiled)   - every shift skin assessment per nursing and weekly ICU skin rounds  - moisture barrier to be applied with dorota care  - active skin problems addressed with nursing on daily rounds     Proph:  SCDs  SUBQ     G:  Line  Right IJ MAC w Minimac placed 6/28 -> Remove  Left brachial a-line placed 6/28 -> Remove  Velázquez 6/28 -> Remove       Code status: Full Code      I personally spent 35 minutes of critical care time directly and personally managing the patient exclusive of separately billable procedures.     A,B,C,D,E,F,G: reviewed     Dispo: CTICU care for now.    CTICU TEAM PHONE [id number]

## 2024-06-30 NOTE — HOSPITAL COURSE
69 year old male with PMH of NIDDM, HTN presented as transfer from [facility] 6/26 for critical LM disease. He presented for outpatient [facility] with the complaint of chest pain, on and off, for about 10 months which has worsened. He presents to CTICU s/p    Operative Procedures Dr Paul Shoemaker on 6/28/24  pump assist MIDCAB x 2 LIMA-LAD and radial-OM  left endovascular radial harvest, left femoral cutdown and cannulation  36733 - RI CABG W/ARTERIAL GRAFT TWO ARTERIAL GRAFTS     1.  Multivessel MIDCAB.  On pump beating heart min invasive left thoracotomy CABG x 2 with LIMA to LAD and radial to OM.    2 endoscopic radial harvest    CTICU course     Transferred to [facility] 6/30  ===============    Floor Course:  - Patient was diuresed for fluid volume overload post cardiac surgery; Preop weight: Weight: 64.7 kg (142 lb 9.6 oz)kg,  Vitals:    06/30/24 0800   Weight: 67.9 kg (149 lb 11.1 oz)   kg  - On ASA, statin, BB, colchine, imdur by discharge  - telemetry at discharge NSR 80's  - 2v CXR done 7/2  - Cardiac rehab referral was placed  - PT recs home  - Anticipate discharge to home with homecare    Discharged on 7/2/2024    On day of discharge, vital signs were stable and no acute distress was noted. All questions were answered. After VS and labs were reviewed it was determined the patient was stable for discharge.   Hospital day of discharge management- spent >30 minutes coordinating the discharge and counseling/educating patient and family regarding discharge instructions.  ===================    Past Medical History:  Diagnosis Date  · Hyperlipidemia         Surgical History  Past Surgical History:  Procedure Laterality Date  · CARDIAC CATHETERIZATION N/A 6/26/2024    Procedure: Left Heart Cath with Coronary Angiography and LV;  Surgeon: Dagoberto Washburn MD;  Location: [facility] Cardiac Cath Lab;  Service: Cardiovascular;  Laterality: N/A;  6/26/24--12:30 pm for [facility] [address]--angina I20.9 and CMO I42.9  [employer];  (self  pay); no PA required       Prescriptions Prior to Admission  Medications Prior to Admission  Medication Sig Dispense Refill Last Dose  · aspirin 81 mg EC tablet Take 1 tablet (81 mg) by mouth once daily.     6/26/2024  · metoprolol succinate XL (Toprol-XL) 25 mg 24 hr tablet Take 1 tablet (25 mg) by mouth once daily. Do not crush or chew.     6/26/2024  · nitroglycerin (Nitrostat) 0.4 mg SL tablet Place 1 tablet (0.4 mg) under the tongue every 5 minutes if needed for chest pain.     Unknown  · rosuvastatin (Crestor) 10 mg tablet Take 1 tablet (10 mg) by mouth once daily.     Unknown

## 2024-07-01 ENCOUNTER — APPOINTMENT (OUTPATIENT)
Dept: RADIOLOGY | Facility: HOSPITAL | Age: 69
DRG: 235 | End: 2024-07-01
Payer: COMMERCIAL

## 2024-07-01 LAB
ALBUMIN SERPL BCP-MCNC: 3.7 G/DL (ref 3.4–5)
ANION GAP SERPL CALC-SCNC: 12 MMOL/L (ref 10–20)
BUN SERPL-MCNC: 24 MG/DL (ref 6–23)
CALCIUM SERPL-MCNC: 9 MG/DL (ref 8.6–10.6)
CHLORIDE SERPL-SCNC: 101 MMOL/L (ref 98–107)
CO2 SERPL-SCNC: 29 MMOL/L (ref 21–32)
CREAT SERPL-MCNC: 0.83 MG/DL (ref 0.5–1.3)
EGFRCR SERPLBLD CKD-EPI 2021: >90 ML/MIN/1.73M*2
ERYTHROCYTE [DISTWIDTH] IN BLOOD BY AUTOMATED COUNT: 14.2 % (ref 11.5–14.5)
GLUCOSE BLD MANUAL STRIP-MCNC: 106 MG/DL (ref 74–99)
GLUCOSE BLD MANUAL STRIP-MCNC: 109 MG/DL (ref 74–99)
GLUCOSE BLD MANUAL STRIP-MCNC: 110 MG/DL (ref 74–99)
GLUCOSE BLD MANUAL STRIP-MCNC: 120 MG/DL (ref 74–99)
GLUCOSE SERPL-MCNC: 114 MG/DL (ref 74–99)
HCT VFR BLD AUTO: 24.4 % (ref 41–52)
HGB BLD-MCNC: 8.2 G/DL (ref 13.5–17.5)
MAGNESIUM SERPL-MCNC: 2.17 MG/DL (ref 1.6–2.4)
MCH RBC QN AUTO: 31.1 PG (ref 26–34)
MCHC RBC AUTO-ENTMCNC: 33.6 G/DL (ref 32–36)
MCV RBC AUTO: 92 FL (ref 80–100)
MGC ASCENT PFT - FEV1 - PRE: 2.51
MGC ASCENT PFT - FEV1 - PREDICTED: 2.85
MGC ASCENT PFT - FVC - PRE: 3.51
MGC ASCENT PFT - FVC - PREDICTED: 3.74
NRBC BLD-RTO: 0 /100 WBCS (ref 0–0)
PHOSPHATE SERPL-MCNC: 2.6 MG/DL (ref 2.5–4.9)
PLATELET # BLD AUTO: 120 X10*3/UL (ref 150–450)
POTASSIUM SERPL-SCNC: 3.7 MMOL/L (ref 3.5–5.3)
RBC # BLD AUTO: 2.64 X10*6/UL (ref 4.5–5.9)
SODIUM SERPL-SCNC: 138 MMOL/L (ref 136–145)
WBC # BLD AUTO: 8.5 X10*3/UL (ref 4.4–11.3)

## 2024-07-01 PROCEDURE — 97530 THERAPEUTIC ACTIVITIES: CPT | Mod: GP

## 2024-07-01 PROCEDURE — 80069 RENAL FUNCTION PANEL: CPT | Performed by: NURSE PRACTITIONER

## 2024-07-01 PROCEDURE — 71045 X-RAY EXAM CHEST 1 VIEW: CPT | Performed by: RADIOLOGY

## 2024-07-01 PROCEDURE — 97161 PT EVAL LOW COMPLEX 20 MIN: CPT | Mod: GP

## 2024-07-01 PROCEDURE — 71045 X-RAY EXAM CHEST 1 VIEW: CPT

## 2024-07-01 PROCEDURE — 2500000005 HC RX 250 GENERAL PHARMACY W/O HCPCS: Performed by: NURSE PRACTITIONER

## 2024-07-01 PROCEDURE — 2500000004 HC RX 250 GENERAL PHARMACY W/ HCPCS (ALT 636 FOR OP/ED): Performed by: NURSE PRACTITIONER

## 2024-07-01 PROCEDURE — 2500000001 HC RX 250 WO HCPCS SELF ADMINISTERED DRUGS (ALT 637 FOR MEDICARE OP): Performed by: NURSE PRACTITIONER

## 2024-07-01 PROCEDURE — 85027 COMPLETE CBC AUTOMATED: CPT | Performed by: NURSE PRACTITIONER

## 2024-07-01 PROCEDURE — 83735 ASSAY OF MAGNESIUM: CPT | Performed by: NURSE PRACTITIONER

## 2024-07-01 PROCEDURE — 36415 COLL VENOUS BLD VENIPUNCTURE: CPT | Performed by: NURSE PRACTITIONER

## 2024-07-01 PROCEDURE — 82947 ASSAY GLUCOSE BLOOD QUANT: CPT

## 2024-07-01 PROCEDURE — 1200000002 HC GENERAL ROOM WITH TELEMETRY DAILY

## 2024-07-01 PROCEDURE — 74018 RADEX ABDOMEN 1 VIEW: CPT | Performed by: RADIOLOGY

## 2024-07-01 PROCEDURE — 74018 RADEX ABDOMEN 1 VIEW: CPT

## 2024-07-01 RX ORDER — FUROSEMIDE 10 MG/ML
20 INJECTION INTRAMUSCULAR; INTRAVENOUS ONCE
Status: COMPLETED | OUTPATIENT
Start: 2024-07-01 | End: 2024-07-01

## 2024-07-01 RX ORDER — COLCHICINE 0.6 MG/1
0.6 TABLET ORAL DAILY
Status: DISCONTINUED | OUTPATIENT
Start: 2024-07-02 | End: 2024-07-02 | Stop reason: HOSPADM

## 2024-07-01 ASSESSMENT — PAIN SCALES - GENERAL
PAINLEVEL_OUTOF10: 0 - NO PAIN
PAINLEVEL_OUTOF10: 2
PAINLEVEL_OUTOF10: 4
PAINLEVEL_OUTOF10: 4

## 2024-07-01 ASSESSMENT — PAIN - FUNCTIONAL ASSESSMENT: PAIN_FUNCTIONAL_ASSESSMENT: 0-10

## 2024-07-01 ASSESSMENT — PAIN SCALES - WONG BAKER
WONGBAKER_NUMERICALRESPONSE: HURTS LITTLE BIT
WONGBAKER_NUMERICALRESPONSE: HURTS LITTLE BIT

## 2024-07-01 ASSESSMENT — COGNITIVE AND FUNCTIONAL STATUS - GENERAL
WALKING IN HOSPITAL ROOM: A LITTLE
MOBILITY SCORE: 17
CLIMB 3 TO 5 STEPS WITH RAILING: A LOT
TURNING FROM BACK TO SIDE WHILE IN FLAT BAD: A LITTLE
MOVING TO AND FROM BED TO CHAIR: A LITTLE
MOVING FROM LYING ON BACK TO SITTING ON SIDE OF FLAT BED WITH BEDRAILS: A LITTLE
STANDING UP FROM CHAIR USING ARMS: A LITTLE

## 2024-07-01 ASSESSMENT — ACTIVITIES OF DAILY LIVING (ADL): ADL_ASSISTANCE: INDEPENDENT

## 2024-07-01 NOTE — CARE PLAN
The patient's goals for the shift include ambulate    The clinical goals for the shift include chest pain free

## 2024-07-01 NOTE — PROGRESS NOTES
"CARDIAC SURGERY DAILY PROGRESS NOTE    Felice Almonte is a 69 y.o. male, with a PMH of  NIDDM, HTN presented as transfer from [facility] 6/26 for critical LM disease. He presented for outpatient [facility] with the complaint of chest pain, on and off, for about 10 months which has worsened. He underwent  CABGx2 (LIMA-LAD, LIMA-RA, RA-OM) with Dr. Paul Shoemaker on 6/28.     OPERATION/PROCEDURE:  Dr Paul Shoemaker on 6/28/24  - Multivessel MIDCAB.  On pump beating heart min invasive left thoracotomy   -CABG x 2 with LIMA to LAD and radial to OM.    -endoscopic radial harvest    CTICU course     Transferred to LT3 6/30  ===============    Interval History:   No acute events    SUBJECTIVE:  On 2L NC over night    Objective   /62 (BP Location: Right arm, Patient Position: Sitting)   Pulse 102   Temp 36.2 °C (97.2 °F) (Temporal)   Resp 18   Ht 1.727 m (5' 8\")   Wt 66.2 kg (145 lb 15.1 oz)   SpO2 93%   BMI 22.19 kg/m²   0-10 (Numeric) Pain Score: 4  Landon-Baker FACES Pain Rating: Hurts little bit   3 Day Weight Change: 0.033 kg (1.2 oz) per day    Intake and Output    Intake/Output Summary (Last 24 hours) at 7/1/2024 1657  Last data filed at 7/1/2024 1317  Gross per 24 hour   Intake --   Output 1120 ml   Net -1120 ml       Physical Exam  Physical Exam  Vitals and nursing note reviewed.   Constitutional:       Appearance: Normal appearance.      Comments: [place]  Family at bedside   HENT:      Mouth/Throat:      Mouth: Mucous membranes are moist.   Eyes:      Conjunctiva/sclera: Conjunctivae normal.   Cardiovascular:      Rate and Rhythm: Normal rate and regular rhythm.      Pulses: Normal pulses.      Heart sounds: Normal heart sounds. No murmur heard.     Comments: HR 110s  Pulmonary:      Effort: Pulmonary effort is normal. No respiratory distress.      Comments: On2L NC  Left pleural CTx1  Abdominal:      General: Bowel sounds are normal. There is no distension.      Palpations: Abdomen is soft.      Tenderness: There is no " abdominal tenderness.   Genitourinary:     Comments: voiding  Musculoskeletal:         General: Normal range of motion.      Cervical back: Neck supple.   Skin:     General: Skin is warm and dry.      Capillary Refill: Capillary refill takes less than 2 seconds.      Comments: Left mini thoracotomy incision C/D/I, well approximated, no s/s infection    Neurological:      General: No focal deficit present.      Mental Status: He is alert and oriented to person, place, and time.   Psychiatric:         Mood and Affect: Mood normal.         Behavior: Behavior normal.     Medications  Scheduled medications  acetaminophen, 650 mg, oral, q6h  aspirin, 81 mg, oral, Daily  atorvastatin, 80 mg, oral, Nightly  bisacodyl, 10 mg, rectal, Once  heparin (porcine), 5,000 Units, subcutaneous, q8h  insulin lispro, 0-5 Units, subcutaneous, TID  iron polysaccharides, 150 mg, oral, Daily  isosorbide dinitrate, 10 mg, oral, TID  lidocaine, 1 patch, transdermal, q24h  melatonin, 6 mg, oral, Nightly  metoprolol tartrate, 37.5 mg, oral, BID  multivitamin with minerals, 1 tablet, oral, Daily  polyethylene glycol, 17 g, oral, BID  sennosides-docusate sodium, 2 tablet, oral, BID    Continuous medications   PRN medications  PRN medications: dextrose, dextrose, glucagon, HYDROmorphone, naloxone, [DISCONTINUED] ondansetron ODT **OR** ondansetron, oxyCODONE, oxygen    Labs  Results for orders placed or performed during the hospital encounter of 06/26/24 (from the past 24 hour(s))   Magnesium   Result Value Ref Range    Magnesium 2.17 1.60 - 2.40 mg/dL   CBC   Result Value Ref Range    WBC 8.5 4.4 - 11.3 x10*3/uL    nRBC 0.0 0.0 - 0.0 /100 WBCs    RBC 2.64 (L) 4.50 - 5.90 x10*6/uL    Hemoglobin 8.2 (L) 13.5 - 17.5 g/dL    Hematocrit 24.4 (L) 41.0 - 52.0 %    MCV 92 80 - 100 fL    MCH 31.1 26.0 - 34.0 pg    MCHC 33.6 32.0 - 36.0 g/dL    RDW 14.2 11.5 - 14.5 %    Platelets 120 (L) 150 - 450 x10*3/uL   Renal Function Panel   Result Value Ref Range     Glucose 114 (H) 74 - 99 mg/dL    Sodium 138 136 - 145 mmol/L    Potassium 3.7 3.5 - 5.3 mmol/L    Chloride 101 98 - 107 mmol/L    Bicarbonate 29 21 - 32 mmol/L    Anion Gap 12 10 - 20 mmol/L    Urea Nitrogen 24 (H) 6 - 23 mg/dL    Creatinine 0.83 0.50 - 1.30 mg/dL    eGFR >90 >60 mL/min/1.73m*2    Calcium 9.0 8.6 - 10.6 mg/dL    Phosphorus 2.6 2.5 - 4.9 mg/dL    Albumin 3.7 3.4 - 5.0 g/dL   POCT GLUCOSE   Result Value Ref Range    POCT Glucose 109 (H) 74 - 99 mg/dL   POCT GLUCOSE   Result Value Ref Range    POCT Glucose 106 (H) 74 - 99 mg/dL     XR chest 1 view 7/1/2024  1.  Slight improvement in right basilar aeration with continued effusions. Small left apical pneumothorax status post chest tube removal.       Transthoracic Echo (TTE) Complete 6/27/2024  1. Basal and mid anterior wall and basal and mid anterior septum are abnormal.    2. Left ventricular ejection fraction is moderately decreased, by visual estimate at 40%.    3. There is global hypokinesis of the left ventricle with minor regional variations.    4. Spectral Doppler shows an impaired relaxation pattern of left ventricular diastolic filling.    5. There is normal right ventricular global systolic function.     IMPRESSION & PLAN:  POD # 3 s/p  MIDCAB CABGx2 (LIMA-LAD, LIMA-RA, RA-OM)  - Increase activity/ ambulation; PT/OT  - Encourage IS, C/DB; respiratory therapy; wean O2 as alicia   - Cardiac rehab referral   - Continue cardiac meds: ASA, BB, statin   - continue colchicine x1 month for robotic MidCAB procedure (0.6mg daily for < 70kg, 0.6mg BID for > 70kg; if concurrently receiving amiodarone reduce dose by 50%)   - Pain and anticonstipation meds  -  1 left pleural in place to -20cm suction; - removed 7/1, follow up CXR with small apical pneumo, follow up  - 2v CXR ordered for 7/2  - Remove epicardial wires prior to discharge   - Tele until discharge  - Optimize nutrition and electrolytes    Rhythm  - Tele: 100s  - Continue BB Metoprolol 37.5 mg  BID  - Adjust medications as tolerated    Acute Blood Loss Anemia   Recent Labs     24  0624 24  0004 24  1239 24  0847 24  0241 24  2256 24  0355   HGB 8.2* 8.8* 8.4* 7.7* 8.5* 9.8* 13.3*   HCT 24.4* 24.9* 24.1* 22.1* 23.8* 28.0* 38.6*   - MV, PO Iron x1mo  - Daily labs, transfuse as indicated    Thrombocytopenia  Recent Labs     24  0624 24  0004 24  1239 24  0847 24  0241 24  2256 24  0355   * 117* 127* 120* 131* 154 143*   - Etiology likely postop/CPB related  - Continue to trend with daily CBCs    Volume/Electrolyte Status: Preop wt Weight: 64.7 kg (142 lb 9.6 oz)   Vitals:    24 0600   Weight: 66.2 kg (145 lb 15.1 oz)     - Weight: 66.2 kg  - Adjust diuresis as needed for postop cardiac surgery hypervolemia  -20 mg IV Lasix   - Replete electrolytes for hypokalemia/hypomagnesemia/hypophosphatemia as needed   - Daily weights and strict I&Os  - Daily RFP while admitted    Hypertension:   Systolic (24hrs), Av , Min:95 , Max:119    - continue home Metoprolol, dose adjusted to 37.5 mg BID  - additional antihypertensives as needed     DM: Prediabetes hx   Lab Results   Component Value Date    HGBA1C 5.7 (H) 2024     Results from last 7 days   Lab Units 24  1718 24  1247 24  0930 24  1820 24  0315 24  0056 24  1253   POCT GLUCOSE mg/dL 120* 106* 109* 120* 160* 133* 102*   -accuchecks premeal and at bedtime  -diabetic diet  -lispro premeal corrective scale     VTE Prophylaxis: SCDs/TEDs, ambulation, SQ heparin  Code Status: Full Code    Dispo  - PT/OT recs low intensity   - Would benefit from homecare for cardiac surgery carepath and RN visits  - Anticipate discharge 2-3 days, pending fluid status, rhythm control, respiratory stability  - Will continue to assess discharge needs      MARZENA Moise-CNP  Cardiac Surgery ANTOINETTE  [facility]  Team Phone  [phone]    7/1/2024  4:57 PM

## 2024-07-01 NOTE — PROGRESS NOTES
Physical Therapy    Physical Therapy Evaluation & Treatment    Patient Name: Felice Almonte  MRN: [id number]  Today's Date: 7/1/2024   Time Calculation  Start Time: 1016  Stop Time: 1052  Time Calculation (min): 36 min    Assessment/Plan   PT Assessment  PT Assessment Results: Decreased strength, Decreased endurance, Impaired balance, Decreased mobility, Pain  Rehab Prognosis: Good  Barriers to Discharge: none  Evaluation/Treatment Tolerance: Patient tolerated treatment well  Medical Staff Made Aware: Yes  Strengths: Ability to acquire knowledge, Access to adaptive/assistive products, Capable of completing ADLs semi/independent, Housing layout, Premorbid level of function, Support of extended family/friends  End of Session Communication: Bedside nurse    Assessment Comment: Pt. is a 69 yom that presents with impairments including increased L rib (surgical) pain, decreased functional strength, decreased activity tolerance/endurance, mildly impaired balance, and increased difficulty with functional mobility compared to baseline level of function. Pt. will benefit from skilled PT intervention while inpatient to address the above deficits and maximize return to PLOF. Anticipate pt will be appropriate for LOW intensity PT with family assist upon discharge.     End of Session Patient Position: Up in chair, Alarm off, not on at start of session (Call light in reach, RN notifed, family present)     IP OR SWING BED PT PLAN  Inpatient or Swing Bed: Inpatient  PT Plan  Treatment/Interventions: Bed mobility, Transfer training, Gait training, Stair training, Balance training, Strengthening, Endurance training, Range of motion, Therapeutic exercise, Therapeutic activity, Home exercise program  PT Plan: Ongoing PT  PT Frequency: 3 times per week  PT Discharge Recommendations: Low intensity level of continued care  Equipment Recommended upon Discharge: Wheeled walker  PT Recommended Transfer Status: Assistive device, Contact guard  PT -  OK to Discharge: Yes (eval complete, refer to dispo)      Subjective     General Visit Information:  General  Reason for Referral: s/p pump assist MIDCABx2 on 6/28  Referred By: Dr. Paul Shoemaker  Past Medical History Relevant to Rehab: NIDDM, HTN presented as transfer from [facility] 6/26 for critical LM disease.  Family/Caregiver Present: Yes  Caregiver Feedback: Brothers present, supportive  Prior to Session Communication: Bedside nurse  Patient Position Received: Bed, 3 rail up, Alarm off, not on at start of session  Preferred Learning Style: auditory, verbal  General Comment: Pt. received supine in bed, agreeable to participate in session. (2L O2 NC, chest tube (on portable suction for ambulation), telemetry)    Home Living:  Home Living  Type of Home: House (2 family home)  Lives With: Alone  Home Adaptive Equipment: Walker rolling or standard  Home Layout: One level  Home Access: Stairs to enter with rails  Entrance Stairs-Number of Steps: 2-4  Bathroom Shower/Tub: Tub/shower unit  Bathroom Equipment: None  Home Living Comments: Pt.'s niece and nephew live in second house and able to assist as needed.    Prior Level of Function:  Prior Function Per Pt/Caregiver Report  Level of [place]: Independent with ADLs and functional transfers, Independent with homemaking with ambulation  ADL Assistance: Independent  Homemaking Assistance: Independent  Ambulatory Assistance: Independent (community ambulator, no AD, no falls)  Vocational: Full time employment (Works in [employer])  Leisure: Going to his cabin    Precautions:  Precautions  Hearing/Visual Limitations: [place], +hearing aid  Medical Precautions: Cardiac precautions, Fall precautions, Oxygen therapy device and L/min, Chest tube  Precautions Comment: MAP 65-85    Vital Signs:  Vital Signs  Heart Rate: 84  Heart Rate Source: Monitor  Patient Position: Lying    Objective   Pain:  Pain Assessment  Pain Assessment: 0-10  0-10 (Numeric) Pain Score: 2  Pain Type: Surgical  pain  Pain Location: Rib cage  Pain Orientation: Left    Cognition:  Cognition  Overall Cognitive Status: Within Functional Limits  Orientation Level: Oriented X4    General Assessments:  Activity Tolerance  Endurance: Tolerates 10 - 20 min exercise with multiple rests  Early Mobility/Exercise Safety Screen: Proceed with mobilization - No exclusion criteria met    Sensation  Light Touch: No apparent deficits    Postural Control  Postural Control: Within Functional Limits    Static Sitting Balance  Static Sitting-Comment/Number of Minutes: SBA  Dynamic Sitting Balance  Dynamic Sitting-Comments: CGA    Static Standing Balance  Static Standing-Comment/Number of Minutes: CGA  Dynamic Standing Balance  Dynamic Standing-Comments: CGA    Functional Assessments:  Bed Mobility  Bed Mobility: Yes  Bed Mobility 1  Bed Mobility 1: Supine to sitting  Level of Assistance 1: Contact guard  Bed Mobility Comments 1: HOB elevated slightly, cues for technique    Transfers  Transfer: Yes  Transfer 1  Transfer From 1: Sit to, Stand to  Transfer to 1: Stand, Sit  Technique 1: Sit to stand, Stand to sit  Transfer Device 1: Walker  Transfer Level of Assistance 1: Contact guard, Minimal verbal cues  Trials/Comments 1: Cues for proper UE placement and technique    Ambulation/Gait Training  Ambulation/Gait Training Performed: Yes  Ambulation/Gait Training 1  Surface 1: Level tile  Device 1: Rolling walker  Assistance 1: Contact guard, Minimal verbal cues  Quality of Gait 1: Decreased step length (decreased leo)  Comments/Distance (ft) 1: 200 ft, cues for AD management and sequencing. Cues for safety as pt. impulsive at times.    Stairs  Stairs: No    Extremity/Trunk Assessments:  RLE   RLE : Within Functional Limits  LLE   LLE : Within Functional Limits    Treatments:  Therapeutic Activity  Therapeutic Activity Performed: Yes  Therapeutic Activity 1: Pt. tolerated sitting EOB ~10 min prior to progressing with OOB mobility to assess  upright tolerance. Pt. completed dynamic seated activities (doff/don socks) with CGAx1, no acute LOB.  Therapeutic Activity 2: Increased time spent educating pt. on safety technique, activity pacing, and importance of upright posture.    Ambulation/Gait Training  Ambulation/Gait Training Performed: Yes  Ambulation/Gait Training 1  Surface 1: Level tile  Device 1: Rolling walker  Assistance 1: Contact guard, Minimal verbal cues  Quality of Gait 1: Decreased step length (decreased leo)  Comments/Distance (ft) 1: 200 ft, cues for AD management and sequencing. Cues for safety as pt. impulsive at times.    Outcome Measures:  [facility] Basic Mobility  Turning from your back to your side while in a flat bed without using bedrails: A little  Moving from lying on your back to sitting on the side of a flat bed without using bedrails: A little  Moving to and from bed to chair (including a wheelchair): A little  Standing up from a chair using your arms (e.g. wheelchair or bedside chair): A little  To walk in hospital room: A little  Climbing 3-5 steps with railing: A lot  Basic Mobility - Total Score: 17    Encounter Problems       Encounter Problems (Active)       Balance       Patient to demonstrate Zaheer static and dynamic standing balance without LOB with change of directions, no hesitancy, appropriate SAUMYA and sequencing to complete functional task.        Start:  07/01/24    Expected End:  07/15/24            Pt will score 24/28 on Tinetti to indicate low risk for falls       Start:  07/01/24    Expected End:  07/15/24               Mobility       STG - Patient will ambulate >/= 300 ft Zaheer with LRAD       Start:  07/01/24    Expected End:  07/15/24            Patient to ascend/descend >/= 4 stairs with HR and CGAx1 to demo ability to safely traverse SWEETIE       Start:  07/01/24    Expected End:  07/15/24            Patient will tolerate >/=30 minutes continuous activity with stable vital signs, RPE </=13/20, RPD </=3/10         Start:  07/01/24    Expected End:  07/15/24               PT Transfers       STG - Patient will perform bed mobility IND with HOB flat       Start:  07/01/24    Expected End:  07/15/24            STG - Patient will transfer sit to and from stand Zaheer with LRAD       Start:  07/01/24    Expected End:  07/15/24                   Education Documentation  Body Mechanics, taught by Ania Nguyen, PT at 7/1/2024 11:39 AM.  Learner: Family, Patient  Readiness: Acceptance  Method: Explanation, Demonstration  Response: Verbalizes Understanding    Home Exercise Program, taught by Ania Nguyen PT at 7/1/2024 11:39 AM.  Learner: Family, Patient  Readiness: Acceptance  Method: Explanation, Demonstration  Response: Verbalizes Understanding    Mobility Training, taught by Ania Nguyen, PT at 7/1/2024 11:39 AM.  Learner: Family, Patient  Readiness: Acceptance  Method: Explanation, Demonstration  Response: Verbalizes Understanding    Education Comments  No comments found.      07/01/24 at 11:41 AM - Ania Nguyen PT

## 2024-07-01 NOTE — PROGRESS NOTES
Met with patient and introduced myself as Care Coordinator and member of the discharge planning team.  Pt is s/p Midcab. He plans to return home with assistance from family as needed. PT and OT recommendations are pending. Will follow up with patient regarding home care needs. Pt was given a heart pillow. Care coordinator will continue to follow for home going needs. Gretel Bhagat RN

## 2024-07-01 NOTE — SIGNIFICANT EVENT
1 left pleural chest tube removed without difficulty. Patient tolerated well.    Stat 1V CXR ordered.    Jenna Warren, APRN-CNP  Cardiac Surgery ANTOINETTE  [facility]  Team Pager [phone]

## 2024-07-02 ENCOUNTER — APPOINTMENT (OUTPATIENT)
Dept: RADIOLOGY | Facility: HOSPITAL | Age: 69
DRG: 235 | End: 2024-07-02
Payer: COMMERCIAL

## 2024-07-02 VITALS
HEIGHT: 68 IN | WEIGHT: 145 LBS | BODY MASS INDEX: 21.98 KG/M2 | DIASTOLIC BLOOD PRESSURE: 64 MMHG | OXYGEN SATURATION: 96 % | RESPIRATION RATE: 17 BRPM | SYSTOLIC BLOOD PRESSURE: 105 MMHG | TEMPERATURE: 97.7 F | HEART RATE: 84 BPM

## 2024-07-02 LAB
ALBUMIN SERPL BCP-MCNC: 3.4 G/DL (ref 3.4–5)
ANION GAP SERPL CALC-SCNC: 12 MMOL/L (ref 10–20)
BUN SERPL-MCNC: 26 MG/DL (ref 6–23)
CALCIUM SERPL-MCNC: 8.6 MG/DL (ref 8.6–10.6)
CHLORIDE SERPL-SCNC: 101 MMOL/L (ref 98–107)
CO2 SERPL-SCNC: 31 MMOL/L (ref 21–32)
CREAT SERPL-MCNC: 0.77 MG/DL (ref 0.5–1.3)
EGFRCR SERPLBLD CKD-EPI 2021: >90 ML/MIN/1.73M*2
ERYTHROCYTE [DISTWIDTH] IN BLOOD BY AUTOMATED COUNT: 14 % (ref 11.5–14.5)
GLUCOSE BLD MANUAL STRIP-MCNC: 102 MG/DL (ref 74–99)
GLUCOSE BLD MANUAL STRIP-MCNC: 126 MG/DL (ref 74–99)
GLUCOSE SERPL-MCNC: 92 MG/DL (ref 74–99)
HCT VFR BLD AUTO: 24.2 % (ref 41–52)
HGB BLD-MCNC: 8.6 G/DL (ref 13.5–17.5)
MAGNESIUM SERPL-MCNC: 2 MG/DL (ref 1.6–2.4)
MCH RBC QN AUTO: 31.2 PG (ref 26–34)
MCHC RBC AUTO-ENTMCNC: 35.5 G/DL (ref 32–36)
MCV RBC AUTO: 88 FL (ref 80–100)
NRBC BLD-RTO: 0 /100 WBCS (ref 0–0)
PHOSPHATE SERPL-MCNC: 2.8 MG/DL (ref 2.5–4.9)
PLATELET # BLD AUTO: 139 X10*3/UL (ref 150–450)
POTASSIUM SERPL-SCNC: 3.5 MMOL/L (ref 3.5–5.3)
RBC # BLD AUTO: 2.76 X10*6/UL (ref 4.5–5.9)
SODIUM SERPL-SCNC: 140 MMOL/L (ref 136–145)
WBC # BLD AUTO: 6.9 X10*3/UL (ref 4.4–11.3)

## 2024-07-02 PROCEDURE — 99231 SBSQ HOSP IP/OBS SF/LOW 25: CPT

## 2024-07-02 PROCEDURE — 80069 RENAL FUNCTION PANEL: CPT | Performed by: NURSE PRACTITIONER

## 2024-07-02 PROCEDURE — 97535 SELF CARE MNGMENT TRAINING: CPT | Mod: GO

## 2024-07-02 PROCEDURE — 2500000001 HC RX 250 WO HCPCS SELF ADMINISTERED DRUGS (ALT 637 FOR MEDICARE OP): Performed by: NURSE PRACTITIONER

## 2024-07-02 PROCEDURE — 2500000004 HC RX 250 GENERAL PHARMACY W/ HCPCS (ALT 636 FOR OP/ED): Performed by: NURSE PRACTITIONER

## 2024-07-02 PROCEDURE — 82947 ASSAY GLUCOSE BLOOD QUANT: CPT

## 2024-07-02 PROCEDURE — 97165 OT EVAL LOW COMPLEX 30 MIN: CPT | Mod: GO

## 2024-07-02 PROCEDURE — 83735 ASSAY OF MAGNESIUM: CPT | Performed by: NURSE PRACTITIONER

## 2024-07-02 PROCEDURE — 71046 X-RAY EXAM CHEST 2 VIEWS: CPT | Performed by: RADIOLOGY

## 2024-07-02 PROCEDURE — 85027 COMPLETE CBC AUTOMATED: CPT | Performed by: NURSE PRACTITIONER

## 2024-07-02 PROCEDURE — 36415 COLL VENOUS BLD VENIPUNCTURE: CPT | Performed by: NURSE PRACTITIONER

## 2024-07-02 PROCEDURE — 71046 X-RAY EXAM CHEST 2 VIEWS: CPT

## 2024-07-02 PROCEDURE — 2500000002 HC RX 250 W HCPCS SELF ADMINISTERED DRUGS (ALT 637 FOR MEDICARE OP, ALT 636 FOR OP/ED): Performed by: NURSE PRACTITIONER

## 2024-07-02 RX ORDER — POLYETHYLENE GLYCOL 3350 17 G/17G
17 POWDER, FOR SOLUTION ORAL 2 TIMES DAILY PRN
COMMUNITY
Start: 2024-07-02

## 2024-07-02 RX ORDER — MULTIVIT-MIN/IRON FUM/FOLIC AC 7.5 MG-4
1 TABLET ORAL DAILY
COMMUNITY
Start: 2024-07-03

## 2024-07-02 RX ORDER — ACETAMINOPHEN 325 MG/1
650 TABLET ORAL EVERY 6 HOURS PRN
COMMUNITY
Start: 2024-07-02 | End: 2024-08-01

## 2024-07-02 RX ORDER — ISOSORBIDE MONONITRATE 30 MG/1
30 TABLET, EXTENDED RELEASE ORAL DAILY
Status: DISCONTINUED | OUTPATIENT
Start: 2024-07-02 | End: 2024-07-02 | Stop reason: HOSPADM

## 2024-07-02 RX ORDER — METOPROLOL TARTRATE 37.5 MG/1
37.5 TABLET, FILM COATED ORAL 2 TIMES DAILY
Qty: 60 TABLET | Refills: 0 | Status: SHIPPED | OUTPATIENT
Start: 2024-07-02 | End: 2024-08-01

## 2024-07-02 RX ORDER — COLCHICINE 0.6 MG/1
0.6 TABLET ORAL DAILY
Qty: 30 TABLET | Refills: 0 | Status: SHIPPED | OUTPATIENT
Start: 2024-07-03 | End: 2024-08-02

## 2024-07-02 RX ORDER — POTASSIUM CHLORIDE 20 MEQ/1
40 TABLET, EXTENDED RELEASE ORAL ONCE
Status: COMPLETED | OUTPATIENT
Start: 2024-07-02 | End: 2024-07-02

## 2024-07-02 RX ORDER — IRON POLYSACCHARIDE COMPLEX 150 MG
150 CAPSULE ORAL DAILY
Qty: 30 CAPSULE | Refills: 0 | Status: SHIPPED | OUTPATIENT
Start: 2024-07-03 | End: 2024-08-02

## 2024-07-02 RX ORDER — FUROSEMIDE 10 MG/ML
20 INJECTION INTRAMUSCULAR; INTRAVENOUS ONCE
Status: COMPLETED | OUTPATIENT
Start: 2024-07-02 | End: 2024-07-02

## 2024-07-02 RX ORDER — ISOSORBIDE MONONITRATE 30 MG/1
30 TABLET, EXTENDED RELEASE ORAL DAILY
Qty: 30 TABLET | Refills: 0 | Status: SHIPPED | OUTPATIENT
Start: 2024-07-03 | End: 2024-08-02

## 2024-07-02 RX ORDER — ATORVASTATIN CALCIUM 80 MG/1
80 TABLET, FILM COATED ORAL NIGHTLY
Qty: 30 TABLET | Refills: 0 | Status: SHIPPED | OUTPATIENT
Start: 2024-07-02 | End: 2024-08-01

## 2024-07-02 ASSESSMENT — COGNITIVE AND FUNCTIONAL STATUS - GENERAL
TURNING FROM BACK TO SIDE WHILE IN FLAT BAD: A LITTLE
DRESSING REGULAR UPPER BODY CLOTHING: A LITTLE
STANDING UP FROM CHAIR USING ARMS: A LITTLE
DRESSING REGULAR LOWER BODY CLOTHING: A LITTLE
MOVING FROM LYING ON BACK TO SITTING ON SIDE OF FLAT BED WITH BEDRAILS: A LITTLE
DAILY ACTIVITIY SCORE: 20
CLIMB 3 TO 5 STEPS WITH RAILING: A LITTLE
TOILETING: A LITTLE
HELP NEEDED FOR BATHING: A LITTLE
MOVING TO AND FROM BED TO CHAIR: A LITTLE
TOILETING: A LITTLE
DAILY ACTIVITIY SCORE: 23
WALKING IN HOSPITAL ROOM: A LITTLE
MOBILITY SCORE: 18

## 2024-07-02 ASSESSMENT — ACTIVITIES OF DAILY LIVING (ADL)
HOME_MANAGEMENT_TIME_ENTRY: 9
BATHING_ASSISTANCE: STAND BY
ADL_ASSISTANCE: INDEPENDENT

## 2024-07-02 ASSESSMENT — PAIN SCALES - GENERAL
PAINLEVEL_OUTOF10: 4
PAINLEVEL_OUTOF10: 4
PAINLEVEL_OUTOF10: 0 - NO PAIN
PAINLEVEL_OUTOF10: 0 - NO PAIN
PAINLEVEL_OUTOF10: 4
PAINLEVEL_OUTOF10: 0 - NO PAIN
PAINLEVEL_OUTOF10: 0 - NO PAIN

## 2024-07-02 ASSESSMENT — PAIN - FUNCTIONAL ASSESSMENT: PAIN_FUNCTIONAL_ASSESSMENT: 0-10

## 2024-07-02 ASSESSMENT — PAIN SCALES - WONG BAKER
WONGBAKER_NUMERICALRESPONSE: HURTS LITTLE BIT

## 2024-07-02 NOTE — DISCHARGE SUMMARY
Discharge Diagnosis  Coronary artery disease involving native coronary artery of native heart with unstable angina pectoris (Multi)    Issues Requiring Follow-Up  DUMONT    Test Results Pending At Discharge  Pending Labs       Order Current Status    Type and Screen Collected (06/30/24 1700)            Hospital Course  69 year old male with PMH of NIDDM, HTN presented as transfer from [facility] 6/26 for critical LM disease. He presented for outpatient [facility] with the complaint of chest pain, on and off, for about 10 months which has worsened. He presents to CTICU s/p    Operative Procedures Dr Paul Shoemaker on 6/28/24  pump assist MIDCAB x 2 LIMA-LAD and radial-OM  left endovascular radial harvest, left femoral cutdown and cannulation  [id number] - MT CABG W/ARTERIAL GRAFT TWO ARTERIAL GRAFTS     1.  Multivessel MIDCAB.  On pump beating heart min invasive left thoracotomy CABG x 2 with LIMA to LAD and radial to OM.    2 endoscopic radial harvest    CTICU course     Transferred to [facility] 6/30  ===============    Floor Course:  - Patient was diuresed for fluid volume overload post cardiac surgery; Preop weight: Weight: 64.7 kg (142 lb 9.6 oz)kg,  Vitals:    06/30/24 0800   Weight: 67.9 kg (149 lb 11.1 oz)   kg  - On ASA, statin, BB, colchine, imdur by discharge  - telemetry at discharge NSR 80's  - 2v CXR done 7/2  - Cardiac rehab referral was placed  - PT recs home  - Anticipate discharge to home with homecare    Discharged on 7/2/2024    On day of discharge, vital signs were stable and no acute distress was noted. All questions were answered. After VS and labs were reviewed it was determined the patient was stable for discharge.   Hospital day of discharge management- spent >30 minutes coordinating the discharge and counseling/educating patient and family regarding discharge instructions.  ===================    Past Medical History:  Diagnosis Date  · Hyperlipidemia         Surgical History  Past Surgical  History:  Procedure Laterality Date  · CARDIAC CATHETERIZATION N/A 6/26/2024    Procedure: Left Heart Cath with Coronary Angiography and LV;  Surgeon: Dagoberto Washburn MD;  Location: [facility];  Service: Cardiovascular;  Laterality: N/A;  6/26/24--12:30 pm for [facility] [id number]--angina I20.9 and CMO I42.9  [employer];  (self pay); no PA required       Prescriptions Prior to Admission  Medications Prior to Admission  Medication Sig Dispense Refill Last Dose  · aspirin 81 mg EC tablet Take 1 tablet (81 mg) by mouth once daily.     6/26/2024  · metoprolol succinate XL (Toprol-XL) 25 mg 24 hr tablet Take 1 tablet (25 mg) by mouth once daily. Do not crush or chew.     6/26/2024  · nitroglycerin (Nitrostat) 0.4 mg SL tablet Place 1 tablet (0.4 mg) under the tongue every 5 minutes if needed for chest pain.     Unknown  · rosuvastatin (Crestor) 10 mg tablet Take 1 tablet (10 mg) by mouth once daily.     Unknown         Pertinent Physical Exam At Time of Discharge  Physical Exam please see daily progress note for physical exam     Home Medications     Medication List      START taking these medications     acetaminophen 325 mg tablet; Commonly known as: Tylenol; Take 2 tablets   (650 mg) by mouth every 6 hours if needed for mild pain (1 - 3).   atorvastatin 80 mg tablet; Commonly known as: Lipitor; Take 1 tablet (80   mg) by mouth once daily at bedtime.   colchicine 0.6 mg tablet; Take 1 tablet (0.6 mg) by mouth once daily.;   Start taking on: July 3, 2024   iron polysaccharides 150 mg iron capsule; Commonly known as:   Nu-Iron,Niferex; Take 1 capsule (150 mg) by mouth once daily.; Start   taking on: July 3, 2024   isosorbide mononitrate ER 30 mg 24 hr tablet; Commonly known as: Imdur;   Take 1 tablet (30 mg) by mouth once daily. Do not crush or chew.; Start   taking on: July 3, 2024   metoprolol tartrate 37.5 mg tablet; Commonly known as: Lopressor; Take 1   tablet (37.5 mg) by mouth 2 times a day.    multivitamin with minerals tablet; Take 1 tablet by mouth once daily.;   Start taking on: July 3, 2024   polyethylene glycol 17 gram packet; Commonly known as: Glycolax,   Miralax; Take 17 g by mouth 2 times a day as needed (constipation).     CONTINUE taking these medications     aspirin 81 mg EC tablet   metoprolol succinate XL 25 mg 24 hr tablet; Commonly known as: Toprol-XL     STOP taking these medications     nitroglycerin 0.4 mg SL tablet; Commonly known as: Nitrostat   rosuvastatin 10 mg tablet; Commonly known as: Crestor       Outpatient Follow-Up  Future Appointments   Date Time Provider Department Center   7/25/2024 10:20 AM CARDSURG [facility] [id number] NURSE [id number] [facility]   8/5/2024  2:15 PM Jose De Jesus Shoemaker MD [facility] MARZENA Cardenas-CNP

## 2024-07-02 NOTE — PROGRESS NOTES
"CARDIAC SURGERY DAILY PROGRESS NOTE    Felice Almonte is a 69 y.o. male, with a PMH of  NIDDM, HTN presented as transfer from [facility] 6/26 for critical LM disease. He presented for outpatient [facility] with the complaint of chest pain, on and off, for about 10 months which has worsened. He underwent  CABGx2 (LIMA-LAD, LIMA-RA, RA-OM) with Dr. Paul Shoemaker on 6/28.     OPERATION/PROCEDURE:  Dr Paul Shoemaker on 6/28/24  - Multivessel MIDCAB.  On pump beating heart min invasive left thoracotomy   -CABG x 2 with LIMA to LAD and radial to OM.    -endoscopic radial harvest    CTICU course     Transferred to 3 6/30  ===============    Interval History:   No acute events    SUBJECTIVE:  On 2L NC over night    Objective   /77 (BP Location: Left arm, Patient Position: Lying)   Pulse 88   Temp 36.4 °C (97.5 °F) (Temporal)   Resp 16   Ht 1.727 m (5' 8\")   Wt 65.8 kg (145 lb)   SpO2 95%   BMI 22.05 kg/m²   0-10 (Numeric) Pain Score: 0 - No pain  Landon-Baker FACES Pain Rating: Hurts little bit   3 Day Weight Change: Unable to Calculate    Intake and Output    Intake/Output Summary (Last 24 hours) at 7/2/2024 1003  Last data filed at 7/1/2024 1730  Gross per 24 hour   Intake --   Output 40 ml   Net -40 ml       Physical Exam  Physical Exam  Vitals and nursing note reviewed.   Constitutional:       Appearance: Normal appearance.      Comments: [place]  Family at bedside   HENT:      Mouth/Throat:      Mouth: Mucous membranes are moist.   Eyes:      Conjunctiva/sclera: Conjunctivae normal.   Cardiovascular:      Rate and Rhythm: Normal rate and regular rhythm.      Pulses: Normal pulses.      Heart sounds: Normal heart sounds. No murmur heard.     Comments: HR 110s  Pulmonary:      Effort: Pulmonary effort is normal. No respiratory distress.      Breath sounds: Normal breath sounds.      Comments: On room air    Abdominal:      General: Bowel sounds are normal. There is no distension.      Palpations: Abdomen is soft.      Tenderness: " There is no abdominal tenderness.   Genitourinary:     Comments: voiding  Musculoskeletal:         General: Normal range of motion.      Cervical back: Neck supple.   Skin:     General: Skin is warm and dry.      Capillary Refill: Capillary refill takes less than 2 seconds.      Comments: Left mini thoracotomy incision C/D/I, well approximated, no s/s infection    Neurological:      General: No focal deficit present.      Mental Status: He is alert and oriented to person, place, and time.   Psychiatric:         Mood and Affect: Mood normal.         Behavior: Behavior normal.       Medications  Scheduled medications  acetaminophen, 650 mg, oral, q6h  aspirin, 81 mg, oral, Daily  atorvastatin, 80 mg, oral, Nightly  bisacodyl, 10 mg, rectal, Once  colchicine, 0.6 mg, oral, Daily  heparin (porcine), 5,000 Units, subcutaneous, q8h  insulin lispro, 0-5 Units, subcutaneous, TID  iron polysaccharides, 150 mg, oral, Daily  isosorbide dinitrate, 10 mg, oral, TID  melatonin, 6 mg, oral, Nightly  metoprolol tartrate, 37.5 mg, oral, BID  multivitamin with minerals, 1 tablet, oral, Daily  polyethylene glycol, 17 g, oral, BID  sennosides-docusate sodium, 2 tablet, oral, BID    Continuous medications   PRN medications  PRN medications: dextrose, dextrose, glucagon, HYDROmorphone, naloxone, [DISCONTINUED] ondansetron ODT **OR** ondansetron, oxyCODONE, oxygen    Labs  Results for orders placed or performed during the hospital encounter of 06/26/24 (from the past 24 hour(s))   POCT GLUCOSE   Result Value Ref Range    POCT Glucose 106 (H) 74 - 99 mg/dL   POCT GLUCOSE   Result Value Ref Range    POCT Glucose 120 (H) 74 - 99 mg/dL   POCT GLUCOSE   Result Value Ref Range    POCT Glucose 110 (H) 74 - 99 mg/dL   Magnesium   Result Value Ref Range    Magnesium 2.00 1.60 - 2.40 mg/dL   CBC   Result Value Ref Range    WBC 6.9 4.4 - 11.3 x10*3/uL    nRBC 0.0 0.0 - 0.0 /100 WBCs    RBC 2.76 (L) 4.50 - 5.90 x10*6/uL    Hemoglobin 8.6 (L) 13.5 -  17.5 g/dL    Hematocrit 24.2 (L) 41.0 - 52.0 %    MCV 88 80 - 100 fL    MCH 31.2 26.0 - 34.0 pg    MCHC 35.5 32.0 - 36.0 g/dL    RDW 14.0 11.5 - 14.5 %    Platelets 139 (L) 150 - 450 x10*3/uL   Renal Function Panel   Result Value Ref Range    Glucose 92 74 - 99 mg/dL    Sodium 140 136 - 145 mmol/L    Potassium 3.5 3.5 - 5.3 mmol/L    Chloride 101 98 - 107 mmol/L    Bicarbonate 31 21 - 32 mmol/L    Anion Gap 12 10 - 20 mmol/L    Urea Nitrogen 26 (H) 6 - 23 mg/dL    Creatinine 0.77 0.50 - 1.30 mg/dL    eGFR >90 >60 mL/min/1.73m*2    Calcium 8.6 8.6 - 10.6 mg/dL    Phosphorus 2.8 2.5 - 4.9 mg/dL    Albumin 3.4 3.4 - 5.0 g/dL   POCT GLUCOSE   Result Value Ref Range    POCT Glucose 102 (H) 74 - 99 mg/dL     XR chest 1 view 7/1/2024  1.  Slight improvement in right basilar aeration with continued effusions. Small left apical pneumothorax status post chest tube removal.       Transthoracic Echo (TTE) Complete 6/27/2024  1. Basal and mid anterior wall and basal and mid anterior septum are abnormal.    2. Left ventricular ejection fraction is moderately decreased, by visual estimate at 40%.    3. There is global hypokinesis of the left ventricle with minor regional variations.    4. Spectral Doppler shows an impaired relaxation pattern of left ventricular diastolic filling.    5. There is normal right ventricular global systolic function.     IMPRESSION & PLAN:  POD # 4 s/p  MIDCAB CABGx2 (LIMA-LAD, LIMA-RA, RA-OM)  - Increase activity/ ambulation; PT/OT - ambulating in hallway   - Encourage IS, C/DB; respiratory therapy; on room air   - Cardiac rehab referral   - Continue cardiac meds: ASA, BB, statin   - continue colchicine x1 month for robotic MidCAB procedure (0.6mg daily for < 70kg, 0.6mg BID for > 70kg; if concurrently receiving amiodarone reduce dose by 50%)   - Pain and anticonstipation meds  - Left pleural chest tube removed 7/1, follow up CXR with tiny apical pneumo  - 2v CXR today 7/2  - Remove epicardial wires  prior to discharge   - Tele until discharge  - Optimize nutrition and electrolytes    Rhythm  - Tele: 80's  - Continue BB Metoprolol 37.5 mg BID convert to Toprol upon discharge  - Adjust medications as tolerated    Acute Blood Loss Anemia   Recent Labs     24  0641 24  0624 24  0004 24  1239 24  0847 24  0241 24  2256   HGB 8.6* 8.2* 8.8* 8.4* 7.7* 8.5* 9.8*   HCT 24.2* 24.4* 24.9* 24.1* 22.1* 23.8* 28.0*   - MV, PO Iron x1mo  - Daily labs, transfuse as indicated    Thrombocytopenia  Recent Labs     24  0641 24  0624 24  0004 24  1239 24  0847 24  0241 24  2256   * 120* 117* 127* 120* 131* 154   - Etiology likely postop/CPB related  - Continue to trend with daily CBCs    Volume/Electrolyte Status: Preop wt Weight: 64.7 kg (142 lb 9.6 oz)   Vitals:    24 0445   Weight: 65.8 kg (145 lb)     - Weight: 65.8 kg  - Adjust diuresis as needed for postop cardiac surgery hypervolemia  -20 mg IV Lasix 7/2  - 40meq Potassium today   - Replete electrolytes for hypokalemia/hypomagnesemia/hypophosphatemia as needed   - Daily weights and strict I&Os  - Daily RFP while admitted    Hypertension:   Systolic (24hrs), Av , Min:100 , Max:136    - continue home Metoprolol, dose adjusted to 37.5 mg BID  - additional antihypertensives as needed     DM: Prediabetes hx   Lab Results   Component Value Date    HGBA1C 5.7 (H) 2024     Results from last 7 days   Lab Units 24  0743 24  2040 24  1718 24  1247 24  0930 24  1820 24  0315   POCT GLUCOSE mg/dL 102* 110* 120* 106* 109* 120* 160*   -accuchecks premeal and at bedtime  -diabetic diet  -lispro premeal corrective scale     VTE Prophylaxis: SCDs/TEDs, ambulation, SQ heparin  Code Status: Full Code    Dispo  - PT/OT recs low intensity   - Would benefit from homecare for cardiac surgery carepath and RN visits  - Anticipate discharge this  eveneing  - Will continue to assess discharge needs      MARZENA Yi-CNP  Cardiac Surgery ANTOINETTE  [facility]  Team Phone [phone]    7/2/2024  10:03 AM

## 2024-07-02 NOTE — NURSING NOTE
Pt awoke c/o  being nauseated and vss. Given iv zofran 4mg . Up to bathroom had bm and voided. Returned to bed and sleep with nausea relieved.

## 2024-07-02 NOTE — PROGRESS NOTES
Occupational Therapy    Evaluation/Treatment    Patient Name: Felice Almonte  MRN: [id number]  : 1955  Today's Date: 24  Time Calculation  Start Time: 1158  Stop Time: 1222  Time Calculation (min): 24 min     OT Student under direct supervision of OTR/L    Assessment:  OT Assessment: decreased functional mobility endurance and ADL tolerance  Prognosis: Good  Barriers to Discharge: None  Evaluation/Treatment Tolerance: Patient tolerated treatment well  Medical Staff Made Aware: Yes  End of Session Communication: Bedside nurse  End of Session Patient Position: Up in chair, Alarm off, not on at start of session  OT Assessment Results: Decreased endurance, Decreased functional mobility, Decreased ADL status  Prognosis: Good  Barriers to Discharge: None  Evaluation/Treatment Tolerance: Patient tolerated treatment well  Medical Staff Made Aware: Yes  Strengths: Housing layout, Support of Caregivers  Plan:  Treatment Interventions: ADL retraining, Functional transfer training, Patient/family training, Equipment evaluation/education, Neuromuscular reeducation, Endurance training  OT Frequency: One time follow up visit  OT Discharge Recommendations: No OT needed after discharge  OT Recommended Transfer Status: Stand by assist  OT - OK to Discharge: Yes  Treatment Interventions: ADL retraining, Functional transfer training, Patient/family training, Equipment evaluation/education, Neuromuscular reeducation, Endurance training    Subjective   General:   OT Received On: 24  General  Reason for Referral: s/p pump assist MIDCABx2 on   Past Medical History Relevant to Rehab: NIDDM, HTN presented as transfer from [facility]  for critical LM disease.  Family/Caregiver Present: Yes  Caregiver Feedback: 2 sisters present  Prior to Session Communication: Bedside nurse  Patient Position Received: Bed, 3 rail up, Alarm off, not on at start of session  General Comment: patient agreeable to OT evaluation, upon arrival  patient attached to tele  Precautions:  Hearing/Visual Limitations: [place], hearing aids  Medical Precautions: Cardiac precautions, Fall precautions  Vital Signs:     Pain:  Pain Assessment  Pain Assessment: 0-10  0-10 (Numeric) Pain Score: 0 - No pain    Objective   Cognition:  Overall Cognitive Status: Within Functional Limits  Arousal/Alertness: Appropriate responses to stimuli  Orientation Level: Oriented X4  Following Commands: Follows one step commands with repetition        Schreiber Agitation Sedation Scale  Schreiber Agitation Sedation Scale (RASS): Alert and calm  Home Living:  Type of Home: House (2 family home)  Lives With: Alone (Niece and nephew live next door)  Home Adaptive Equipment: Walker rolling or standard  Home Layout: One level  Home Access: Stairs to enter with rails  Entrance Stairs-Number of Steps: 2-4 SWEETIE  Bathroom Shower/Tub: Tub/shower unit  Bathroom Equipment:  (family states that shower chair and grab bars can be added)  Prior Function:  Level of [place]: Independent with ADLs and functional transfers, Independent with homemaking with ambulation  Receives Help From: Family  ADL Assistance: Independent  Homemaking Assistance: Independent  Ambulatory Assistance: Independent  Vocational: Full time employment  Leisure: Going to his cabin  Hand Dominance: Right  Prior Function Comments: -falls     ADL:  Eating Assistance: Independent  Eating Deficit: Setup  Grooming Assistance: Independent  Grooming Deficit:  (anticipated)  Bathing Assistance: Stand by  Bathing Deficit:  (anticipated)  UE Dressing Assistance: Minimal  UE Dressing Deficit: Thread RUE  LE Dressing Assistance: Minimal  LE Dressing Deficit: Don/doff R sock, Don/doff L sock  Toileting Assistance with Device: Stand by  Toileting Deficit: Supervison/safety  Activities of Daily Living: Grooming  Grooming Comments: TREATMENT:patient washed hands at sink with supervision and FWW for stability and to maintain safety    UE Dressing  UE  Dressing Comments: TREATMENT: patient [facility] gown with min A and min VCs in standing    Toileting  Toileting Comments: TREATMENT:patient completed toilet hygiene with supervision and utilizing a FWW for support  Activity Tolerance:  Early Mobility/Exercise Safety Screen: Proceed with mobilization - No exclusion criteria met     Bed Mobility/Transfers: Bed Mobility  Bed Mobility: Yes  Bed Mobility 1  Bed Mobility 1: Supine to sitting  Level of Assistance 1: Close supervision    Transfers  Transfer: Yes  Transfer 1  Transfer From 1: Sit to, Stand to  Transfer to 1: Stand, Sit  Technique 1: Stand to sit, Sit to stand  Transfer Device 1: Walker  Transfer Level of Assistance 1: Close supervision, Minimal verbal cues  Transfers 2  Transfer From 2: Stand to  Transfer to 2: Chair with arms  Technique 2: Stand pivot  Transfer Level of Assistance 2:  (CGA/SBA)    Toilet Transfers  Toilet Transfers Comments: TREATMENT: patient transfered with FWW to toilet with supervision to maintain safety    Functional Mobility:  Functional Mobility  Functional Mobility Performed: Yes  Functional Mobility 1  Surface 1: Level tile  Device 1: Rolling walker  Assistance 1: Close supervision  Comments 1: patient ambulated community distances with FWW and SBA to maintain safety, min VCs to keep walker close and maintain safe speed  Sitting Balance:  Static Sitting Balance  Static Sitting-Level of Assistance: Distant supervision  Dynamic Sitting Balance  Dynamic Sitting-Comments: distant supervision  Standing Balance:  Static Standing Balance  Static Standing-Level of Assistance: Close supervision  Dynamic Standing Balance  Dynamic Standing-Comments: SBA     Vision:Vision - Basic Assessment  Current Vision: Wears glasses only for reading  Sensation:  Sensation Comment: patient did not note any numbness or tingling  Strength:  Strength Comments: (B)  5/5, (B) elbows >=3+/5, (B) shoulders >=3+/5    Hand Function:  Hand  Function  Gross Grasp: Functional  Coordination: Functional  Extremities: RUE   RUE : Within Functional Limits and LUE   LUE: Within Functional Limits    Outcome Measures: [facility] Daily Activity  Putting on and taking off regular lower body clothing: A little  Bathing (including washing, rinsing, drying): A little  Putting on and taking off regular upper body clothing: A little  Toileting, which includes using toilet, bedpan or urinal: A little  Taking care of personal grooming such as brushing teeth: None  Eating Meals: None  Daily Activity - Total Score: 20    , E = Exercise and Early Mobility  Early Mobility/Exercise Safety Screen: Proceed with mobilization - No exclusion criteria met  ICU Mobility Scale: Walking with assistance of 1 person, and OT Adult Other Outcome Measures  4AT: negative    Education Documentation  Body Mechanics, taught by QUINCY Leyva at 7/2/2024 12:45 PM.  Learner: Family, Patient  Readiness: Acceptance  Method: Explanation  Response: Needs Reinforcement    Precautions, taught by QUINCY Leyva at 7/2/2024 12:45 PM.  Learner: Family, Patient  Readiness: Acceptance  Method: Explanation  Response: Needs Reinforcement    ADL Training, taught by QUINCY Leyva at 7/2/2024 12:45 PM.  Learner: Family, Patient  Readiness: Acceptance  Method: Explanation  Response: Needs Reinforcement    Education Comments  No comments found.      Goals:  Encounter Problems       Encounter Problems (Active)       ADLs       Patient with complete upper body dressing with independent level of assistance donning and doffing all UE clothes. (Progressing)       Start:  07/02/24    Expected End:  07/16/24            Patient with complete lower body dressing with independent level of assistance donning and doffing all LE clothes   (Progressing)       Start:  07/02/24    Expected End:  07/16/24               BALANCE       Pt will maintain dynamic standing balance during ADL task with  independent level of assistance in order to demonstrate decreased risk of falling and improved postural control. (Progressing)       Start:  07/02/24    Expected End:  07/16/24               EXERCISE/STRENGTHENING       Patient will tolerate >15 minutes of activity with <2 rest breaks utilizing EC/WS strategies as needed.  (Progressing)       Start:  07/02/24    Expected End:  07/16/24               MOBILITY       Patient will perform Functional mobility Household distances/Community Distances with independent level of assistance and least restrictive device in order to improve safety and functional mobility. (Progressing)       Start:  07/02/24    Expected End:  07/16/24               TRANSFERS       Patient will complete functional transfers with least restrictive device with independent level of assistance. (Progressing)       Start:  07/02/24    Expected End:  07/16/24               Nanette Ulrich S/OT

## 2024-07-02 NOTE — DISCHARGE INSTRUCTIONS
Don't forget to “Keep Your Move in the Tube!!”     Please refer to the “Move in the Tube” handout.  -- Load bearing activities can be completed if you are “staying in the tube.” If you are attempting load bearing activities, let pain be your guide with when trying an activity “out of the tube”. If an activity hurts or is uncomfortable go back to doing it while you stay “in the tube”. There is no time limit to “stay in the tube”.     -- Non-load bearing activities, which are your activities of daily living, can be completed with your arms “out of the tube” as long as you remain pain free. Some activities of daily living examples are dressing, personal care, showering, washing hair, and toilet hygiene.     Don't forget to KEEP YOUR MOVE IN THE TUBE and think of a CORONA Gómez dinosaur!                                       **IMPORTANT**  Prevention of Infective Endocarditis (Heart Infections) After Cardiac Surgery:    Infective endocarditis occurs when bacteria enters the bloodstream and then attaches to the heart. Patients with a new heart valve, repaired heart valve, or graft used to repair/replace their aorta are at higher risk for developing this because of the prosthetic (man-made) medical material in their heart. Endocarditis is rare but when you undergo certain medical or dental procedures, as listed below, it can give bacteria an opportunity to enter the blood and cause this type of infection. To prevent this, preventative antibiotics taken before these procedures are required.     Antibiotics are always required PRIOR to the following:  - Dental work with gingival, periapical, or other gingival perforation (such as tooth extractions, dental abscess drainage, and dental cleanings)  - Respiratory procedures with incisions or biopsies   - Cardiac or vascular procedures to place or replace prosthetic material (such as heart valves, stents, etc.)    Antibiotics are required in the following situations ONLY if an  infection is already present:  - Skin or soft tissue procedures with infected tissue  - Gastrointestinal procedures with GI infections  - Urinary or genital procedure with acute genitourinary infections    Antibiotic examples you should expect to be prescribed:  - Amoxicillin or Ampicillin are usually the  first choice  - Cephalexin, Clindamycin, or Vancomycin may be used if you are unable to tolerate/allergic to Amoxicillin or Ampicillin  - Amoxicillin or Ampicillin (if allergic, use Vancomycin) will cover for enterococcus if you have a known acute biliary tract infection   - Specific antibiotics for a known organism should be used when treating an active infection    Please notify your dentist/primary physician/cardiologist PRIOR to these types of procedures to obtain the proper antibiotics and prevent a serious infection in your heart. When in doubt, always ask!   Additionally, good oral hygiene (routine brushing, flossing, and dental care) and prompt treatment of other infections (such as UTIs and skin infections) are equally as important to prevent endocarditis!!          Additional Post-Operative Instructions:  - Remember to use your Incentive spirometer 10x/hr while awake. Remember to cough and deep breath.  - No NSAIDs (common over-the-counter NSAIDs are ibuprofen/Motrin/Advil, naproxen/Naprosyn/Aleve) for 3 months after cardiac surgery; if NSAIDs needed after 3 months, clear use with cardiologist before starting.       Your home medications may have changed after surgery. Carefully compare this list with your prescription bottles at home and set aside any medications you are told to not take so you do not confuse them. Do not dispose of any medications until your follow-up, since your doctors may restart some at your follow-up appointments. It is important to bring a complete, current list of your medications to any medical appointments or hospitalizations.    For any questions about your discharge, please  call the cardiac surgery office at [phone]

## 2024-07-02 NOTE — SIGNIFICANT EVENT
Felice Almonte is a 69 y.o. male on day 4 of admission presenting with Coronary artery disease involving native coronary artery of native heart with unstable angina pectoris (Multi).     Surgeon: Paul Shoemaker  DOS: June 28, 2024  Procedure: On pump CABGx2 (LIMA-LAD, LIMA-RA, RA-OM)      EF: 30-35%   Airway: Grade IIa - partial view of glottis      Full Code    HPI: Patient underwent an outpatient LHC for intermittent chest pain for the previous 10 months. Subsequently identified to have critical LM disease; transferred from [facility] on 6/26 for surgical revascularization.     PMH:  CAD  NIDDM  HTN    Examined patient, observed him ambulate on the reyes, reviewed bloodwork and radiology with ANTOINETTE this morning. Patient progressing well and interested be discharged.     Plan:  Plan to discharge today  Will plan homecare for cardiac surgery carepath and RN visits  Will plan to send with low-dose diuretic and plan for follow-up with his cardiologist and PCP in the community.    Clinical Care Time: 20 mins    Andre Arango MD

## 2024-07-03 ENCOUNTER — HOME HEALTH ADMISSION (OUTPATIENT)
Dept: HOME HEALTH SERVICES | Facility: HOME HEALTH | Age: 69
End: 2024-07-03
Payer: COMMERCIAL

## 2024-07-03 ENCOUNTER — DOCUMENTATION (OUTPATIENT)
Dept: HOME HEALTH SERVICES | Facility: HOME HEALTH | Age: 69
End: 2024-07-03
Payer: COMMERCIAL

## 2024-07-03 NOTE — HH CARE COORDINATION
Home Care received a Referral for Nursing and Physical Therapy. We have processed the referral for a Start of Care on 7/4.     If you have any questions or concerns, please feel free to contact us at [phone]. Follow the prompts, enter your five digit zip code, and you will be directed to your care team on EAST 2.

## 2024-07-06 ENCOUNTER — HOME CARE VISIT (OUTPATIENT)
Dept: HOME HEALTH SERVICES | Facility: HOME HEALTH | Age: 69
End: 2024-07-06
Payer: COMMERCIAL

## 2024-07-06 VITALS
HEART RATE: 80 BPM | OXYGEN SATURATION: 95 % | WEIGHT: 145 LBS | RESPIRATION RATE: 16 BRPM | DIASTOLIC BLOOD PRESSURE: 64 MMHG | BODY MASS INDEX: 21.98 KG/M2 | HEIGHT: 68 IN | SYSTOLIC BLOOD PRESSURE: 108 MMHG | TEMPERATURE: 98.9 F

## 2024-07-06 PROCEDURE — G0299 HHS/HOSPICE OF RN EA 15 MIN: HCPCS

## 2024-07-06 SDOH — ECONOMIC STABILITY: FOOD INSECURITY: MEALS PER DAY: 3

## 2024-07-06 ASSESSMENT — ENCOUNTER SYMPTOMS
FATIGUES EASILY: 1
APPETITE LEVEL: GOOD
PERSON REPORTING PAIN: PATIENT
CHANGE IN APPETITE: INCREASED
DEPRESSION: 0
OCCASIONAL FEELINGS OF UNSTEADINESS: 0
PAIN LOCATION: CHEST
FATIGUE: 1
LOWEST PAIN SEVERITY IN PAST 24 HOURS: 0/10
PAIN LOCATION - PAIN SEVERITY: 1/10
SUBJECTIVE PAIN PROGRESSION: RAPIDLY IMPROVING
LOSS OF SENSATION IN FEET: 0
SHORTNESS OF BREATH: 1
DYSPNEA ON EXERTION: 1
CONSTIPATION: 1
MUSCLE WEAKNESS: 1
PAIN LOCATION - RELIEVING FACTORS: TYLENOL
HIGHEST PAIN SEVERITY IN PAST 24 HOURS: 5/10
LAST BOWEL MOVEMENT: 67023
DYSPNEA ACTIVITY LEVEL: AT REST
PAIN: 1

## 2024-07-06 ASSESSMENT — PAIN SCALES - PAIN ASSESSMENT IN ADVANCED DEMENTIA (PAINAD)
CONSOLABILITY: 0
NEGVOCALIZATION: 0
NEGVOCALIZATION: 0 - NONE.
TOTALSCORE: 0
BODYLANGUAGE: 0 - RELAXED.
CONSOLABILITY: 0 - NO NEED TO CONSOLE.
FACIALEXPRESSION: 0 - SMILING OR INEXPRESSIVE.
BREATHING: 0
FACIALEXPRESSION: 0
BODYLANGUAGE: 0

## 2024-07-06 ASSESSMENT — ACTIVITIES OF DAILY LIVING (ADL)
DRESSING_UB_CURRENT_FUNCTION: STAND BY ASSIST
OASIS_M1830: 02
TOILETING: 1
HOUSEKEEPING ASSESSED: 1
PHYSICAL TRANSFERS ASSESSED: 1
GROOMING_CURRENT_FUNCTION: STAND BY ASSIST
PREPARING MEALS: DEPENDENT
USING THE TELPHONE: DEPENDENT
FEEDING ASSESSED: 1
TRANSPORTATION ASSESSED: 1
LAUNDRY: DEPENDENT
FEEDING: STAND BY ASSIST
ORAL_CARE_ASSESSED: 1
AMBULATION ASSISTANCE: STAND BY ASSIST
LAUNDRY ASSESSED: 1
LIGHT HOUSEKEEPING: DEPENDENT
BATHING ASSESSED: 1
SHOPPING ASSESSED: 1
TRANSPORTATION: DEPENDENT
ORAL_CARE_CURRENT_FUNCTION: NEEDS ASSISTANCE
ENTERING_EXITING_HOME: MODERATE ASSIST
TOILETING: STAND BY ASSIST
GROOMING ASSESSED: 1
AMBULATION ASSISTANCE: 1
TELEPHONE USE ASSESSED: 1
CURRENT_FUNCTION: STAND BY ASSIST
DRESSING_LB_CURRENT_FUNCTION: STAND BY ASSIST
BATHING_CURRENT_FUNCTION: STAND BY ASSIST
SHOPPING: DEPENDENT

## 2024-07-06 ASSESSMENT — LIFESTYLE VARIABLES: SMOKING_STATUS: 1

## 2024-07-06 NOTE — Clinical Note
Hello    This patient is to have his week after discharge lab work drawn the week of the 7th. (CBC w/diff, BMP, Magnesium.  He has a doctor appointment on Monday, he wants to ask the doctor to draw the lab work then.    He also has a foam strip bandage on the one incision on the left side.  The nurse said the incision had a small area not closed yet.  I did not remove it, they can do that at the cardiology visit     He is very [place] and you have to look at him when talking.

## 2024-07-06 NOTE — HOME HEALTH
This patient lives in a home that is attached to his nephews home.  This patients door is the first one on this complex.  The door enters a mud room and then the patient living quarters is up the steps.

## 2024-07-06 NOTE — Clinical Note
----- Message -----  From: Desiree Castro RN  Sent: 7/6/2024   8:04 PM EDT  To: FLORENTINO Gilmore    This patient is to have his week after discharge lab work drawn the week of the 7th. (CBC w/diff, BMP, Magnesium.  He has a doctor appointment on Monday, he wants to ask the doctor to draw the lab work then.    He also has a foam strip bandage on the one incision on the left side.  The nurse said the incision had a small area not closed yet.  I did not remove it, they can do that at the cardiology visit     He is very [place] and you have to look at him when talking.

## 2024-07-08 ENCOUNTER — HOME CARE VISIT (OUTPATIENT)
Dept: HOME HEALTH SERVICES | Facility: HOME HEALTH | Age: 69
End: 2024-07-08
Payer: COMMERCIAL

## 2024-07-08 VITALS
DIASTOLIC BLOOD PRESSURE: 78 MMHG | OXYGEN SATURATION: 98 % | RESPIRATION RATE: 18 BRPM | SYSTOLIC BLOOD PRESSURE: 140 MMHG | HEART RATE: 88 BPM | TEMPERATURE: 97.4 F

## 2024-07-08 PROCEDURE — G0151 HHCP-SERV OF PT,EA 15 MIN: HCPCS

## 2024-07-08 SDOH — HEALTH STABILITY: PHYSICAL HEALTH: PHYSICAL EXERCISE: 10

## 2024-07-08 SDOH — HEALTH STABILITY: PHYSICAL HEALTH: EXERCISE ACTIVITIES SETS: 1

## 2024-07-08 SDOH — HEALTH STABILITY: PHYSICAL HEALTH: PHYSICAL EXERCISE: SITTING

## 2024-07-08 SDOH — HEALTH STABILITY: PHYSICAL HEALTH: EXERCISE ACTIVITY: HIP FLEX.

## 2024-07-08 SDOH — HEALTH STABILITY: PHYSICAL HEALTH

## 2024-07-08 SDOH — HEALTH STABILITY: PHYSICAL HEALTH: EXERCISE ACTIVITY: KNEE EXT.

## 2024-07-08 SDOH — HEALTH STABILITY: PHYSICAL HEALTH: EXERCISE ACTIVITY: ANKLE PUMPS

## 2024-07-08 SDOH — HEALTH STABILITY: PHYSICAL HEALTH: EXERCISE COMMENTS: PT. REQUIRED INSTR. FOR PROPER PACE AND FULL AVAILABLE ROM FOR EACH EXERCISE.

## 2024-07-08 ASSESSMENT — ACTIVITIES OF DAILY LIVING (ADL)
AMBULATION ASSISTANCE: ONE PERSON
CURRENT_FUNCTION: ONE PERSON
PHYSICAL TRANSFERS ASSESSED: 1
AMBULATION_DISTANCE/DURATION_TOLERATED: 80 FEET X 1
AMBULATION ASSISTANCE: STAND BY ASSIST
CURRENT_FUNCTION: STAND BY ASSIST
AMBULATION ASSISTANCE ON FLAT SURFACES: 1
PHYSICAL_TRANSFERS_DEVICES: STRAIGHT CANE
AMBULATION ASSISTANCE: 1

## 2024-07-08 ASSESSMENT — ENCOUNTER SYMPTOMS
MUSCLE WEAKNESS: 1
SUBJECTIVE PAIN PROGRESSION: GRADUALLY IMPROVING
PERSON REPORTING PAIN: PATIENT
PAIN: 1
HIGHEST PAIN SEVERITY IN PAST 24 HOURS: 6/10
PAIN SEVERITY GOAL: 3/10
OCCASIONAL FEELINGS OF UNSTEADINESS: 0
LOWEST PAIN SEVERITY IN PAST 24 HOURS: 2/10
PAIN LOCATION: CHEST

## 2024-07-09 LAB
ATRIAL RATE: 68 BPM
P AXIS: 9 DEGREES
P OFFSET: 173 MS
P ONSET: 115 MS
PR INTERVAL: 214 MS
Q ONSET: 222 MS
QRS COUNT: 11 BEATS
QRS DURATION: 88 MS
QT INTERVAL: 430 MS
QTC CALCULATION(BAZETT): 457 MS
QTC FREDERICIA: 448 MS
R AXIS: 22 DEGREES
T AXIS: 151 DEGREES
T OFFSET: 437 MS
VENTRICULAR RATE: 68 BPM

## 2024-07-10 ENCOUNTER — HOME CARE VISIT (OUTPATIENT)
Dept: HOME HEALTH SERVICES | Facility: HOME HEALTH | Age: 69
End: 2024-07-10
Payer: COMMERCIAL

## 2024-07-10 VITALS
RESPIRATION RATE: 18 BRPM | OXYGEN SATURATION: 97 % | DIASTOLIC BLOOD PRESSURE: 68 MMHG | HEART RATE: 76 BPM | TEMPERATURE: 98.2 F | SYSTOLIC BLOOD PRESSURE: 112 MMHG

## 2024-07-10 PROCEDURE — G0300 HHS/HOSPICE OF LPN EA 15 MIN: HCPCS

## 2024-07-10 ASSESSMENT — ENCOUNTER SYMPTOMS
APPETITE LEVEL: GOOD
MUSCLE WEAKNESS: 1
DENIES PAIN: 1
LAST BOWEL MOVEMENT: 67031
CHANGE IN APPETITE: UNCHANGED

## 2024-07-10 NOTE — ADDENDUM NOTE
Addendum  created 07/10/24 1213 by Keerthi Gaines    Attestation recorded in Intraprocedure (Perfusion), Intraprocedure Attestations filed (Perfusion)

## 2024-07-11 ENCOUNTER — HOME CARE VISIT (OUTPATIENT)
Dept: HOME HEALTH SERVICES | Facility: HOME HEALTH | Age: 69
End: 2024-07-11
Payer: COMMERCIAL

## 2024-07-11 LAB — EJECTION FRACTION: 35 %

## 2024-07-16 ENCOUNTER — HOME CARE VISIT (OUTPATIENT)
Dept: HOME HEALTH SERVICES | Facility: HOME HEALTH | Age: 69
End: 2024-07-16
Payer: COMMERCIAL

## 2024-07-16 VITALS
HEART RATE: 88 BPM | OXYGEN SATURATION: 96 % | DIASTOLIC BLOOD PRESSURE: 86 MMHG | TEMPERATURE: 98.7 F | SYSTOLIC BLOOD PRESSURE: 108 MMHG | RESPIRATION RATE: 18 BRPM

## 2024-07-16 PROCEDURE — G0157 HHC PT ASSISTANT EA 15: HCPCS

## 2024-07-16 ASSESSMENT — ENCOUNTER SYMPTOMS
SUBJECTIVE PAIN PROGRESSION: GRADUALLY IMPROVING
PAIN LOCATION - PAIN FREQUENCY: INTERMITTENT
PERSON REPORTING PAIN: PATIENT
HIGHEST PAIN SEVERITY IN PAST 24 HOURS: 5/10
PAIN LOCATION - PAIN SEVERITY: 2/10
PAIN SEVERITY GOAL: 0/10
PAIN: 1
LOWEST PAIN SEVERITY IN PAST 24 HOURS: 0/10
PAIN LOCATION - PAIN QUALITY: SORE/ACHE
PAIN LOCATION: CHEST
PAIN LOCATION - EXACERBATING FACTORS: ACTIVITY

## 2024-07-17 ENCOUNTER — HOME CARE VISIT (OUTPATIENT)
Dept: HOME HEALTH SERVICES | Facility: HOME HEALTH | Age: 69
End: 2024-07-17
Payer: COMMERCIAL

## 2024-07-17 VITALS
SYSTOLIC BLOOD PRESSURE: 110 MMHG | HEART RATE: 76 BPM | OXYGEN SATURATION: 96 % | RESPIRATION RATE: 20 BRPM | WEIGHT: 137 LBS | DIASTOLIC BLOOD PRESSURE: 60 MMHG | TEMPERATURE: 97.8 F | BODY MASS INDEX: 20.83 KG/M2

## 2024-07-17 PROCEDURE — G0299 HHS/HOSPICE OF RN EA 15 MIN: HCPCS

## 2024-07-17 ASSESSMENT — ENCOUNTER SYMPTOMS
SHORTNESS OF BREATH: 1
DYSPNEA ACTIVITY LEVEL: AFTER AMBULATING LESS THAN 10 FT
PERSON REPORTING PAIN: PATIENT
APPETITE LEVEL: GOOD
DENIES PAIN: 1
CHANGE IN APPETITE: UNCHANGED

## 2024-07-18 ENCOUNTER — HOME CARE VISIT (OUTPATIENT)
Dept: HOME HEALTH SERVICES | Facility: HOME HEALTH | Age: 69
End: 2024-07-18
Payer: COMMERCIAL

## 2024-07-22 ENCOUNTER — HOME CARE VISIT (OUTPATIENT)
Dept: HOME HEALTH SERVICES | Facility: HOME HEALTH | Age: 69
End: 2024-07-22
Payer: COMMERCIAL

## 2024-07-25 ENCOUNTER — TELEMEDICINE CLINICAL SUPPORT (OUTPATIENT)
Dept: CARDIAC SURGERY | Facility: HOSPITAL | Age: 69
End: 2024-07-25
Payer: COMMERCIAL

## 2024-07-25 ENCOUNTER — HOME CARE VISIT (OUTPATIENT)
Dept: HOME HEALTH SERVICES | Facility: HOME HEALTH | Age: 69
End: 2024-07-25
Payer: COMMERCIAL

## 2024-07-25 VITALS
OXYGEN SATURATION: 95 % | RESPIRATION RATE: 18 BRPM | SYSTOLIC BLOOD PRESSURE: 142 MMHG | TEMPERATURE: 98.2 F | HEART RATE: 88 BPM | DIASTOLIC BLOOD PRESSURE: 80 MMHG

## 2024-07-25 PROCEDURE — G0151 HHCP-SERV OF PT,EA 15 MIN: HCPCS

## 2024-07-25 ASSESSMENT — ENCOUNTER SYMPTOMS
OCCASIONAL FEELINGS OF UNSTEADINESS: 0
PERSON REPORTING PAIN: PATIENT
HIGHEST PAIN SEVERITY IN PAST 24 HOURS: 4/10
LOWEST PAIN SEVERITY IN PAST 24 HOURS: 1/10
PAIN SEVERITY GOAL: 3/10
PAIN LOCATION: CHEST
PAIN: 1
SUBJECTIVE PAIN PROGRESSION: GRADUALLY IMPROVING

## 2024-07-25 ASSESSMENT — ACTIVITIES OF DAILY LIVING (ADL)
ADLS_COMMENTS: PT. INDEP. WITH ADLS AND IADLS AT THIS TIME.
HOME_HEALTH_OASIS: 00
OASIS_M1830: 01

## 2024-07-25 NOTE — PROGRESS NOTES
A telephone visit (audio only) between Felice Almonte (at the originating site) and the provider (at the distant site) was utilized to provide this telehealth service.    Patient is having a telehealth nurse visit today following hospital discharge on July 2, 2024.    Patient is 27 days s/p MIDCAB x 2 with Dr. Paul Shoemaker.  I spoke to his sister because he is hard of hearing.  Incisions and chest tube sites are closed without redness or drainage and he is taking Tylenol as needed for discomfort.  He has some exertional shortness of breath that resolves with rest and he is using the incentive spirometer.  He has no edema in his lower extremities.   Patient is ambulating with periods of rest as needed.  Sister will encourage him to increase his level of activity as tolerated.  Physical therapy is coming to the house along with nurse care.   Sister reports that patient doesn't have much appetite since surgery.  They also lost their brother recently and she feels this may be contributing to the loss of appetite.  She is preparing meals for him and encouraging him to eat frequent small meals.  Patient is taking all medications as prescribed.    Patient saw Dr. Washburn two weeks ago for cardiology follow-up care.  He has a post-op visit with Dr. Paul Shoemaker scheduled for August 5th with a chest x-ray.  Sister or patient will call our office with any questions or concerns.

## 2024-07-29 DIAGNOSIS — I25.10 CORONARY ARTERY DISEASE INVOLVING NATIVE CORONARY ARTERY OF NATIVE HEART WITHOUT ANGINA PECTORIS: ICD-10-CM

## 2024-08-05 ENCOUNTER — HOSPITAL ENCOUNTER (OUTPATIENT)
Dept: RADIOLOGY | Facility: HOSPITAL | Age: 69
Discharge: HOME | End: 2024-08-05
Payer: COMMERCIAL

## 2024-08-05 ENCOUNTER — OFFICE VISIT (OUTPATIENT)
Dept: CARDIAC SURGERY | Facility: HOSPITAL | Age: 69
End: 2024-08-05
Payer: COMMERCIAL

## 2024-08-05 VITALS
SYSTOLIC BLOOD PRESSURE: 89 MMHG | HEIGHT: 68 IN | DIASTOLIC BLOOD PRESSURE: 67 MMHG | WEIGHT: 140 LBS | HEART RATE: 78 BPM | BODY MASS INDEX: 21.22 KG/M2

## 2024-08-05 DIAGNOSIS — Z95.1 S/P CABG X 2: ICD-10-CM

## 2024-08-05 DIAGNOSIS — I25.5 ISCHEMIC CARDIOMYOPATHY: ICD-10-CM

## 2024-08-05 DIAGNOSIS — I25.10 CORONARY ARTERY DISEASE INVOLVING NATIVE CORONARY ARTERY OF NATIVE HEART WITHOUT ANGINA PECTORIS: ICD-10-CM

## 2024-08-05 PROCEDURE — 71046 X-RAY EXAM CHEST 2 VIEWS: CPT

## 2024-08-05 PROCEDURE — 71046 X-RAY EXAM CHEST 2 VIEWS: CPT | Performed by: RADIOLOGY

## 2024-08-05 PROCEDURE — 99213 OFFICE O/P EST LOW 20 MIN: CPT | Performed by: THORACIC SURGERY (CARDIOTHORACIC VASCULAR SURGERY)

## 2024-08-05 RX ORDER — METOPROLOL TARTRATE 37.5 MG/1
18.75 TABLET, FILM COATED ORAL 2 TIMES DAILY
Qty: 30 TABLET | Refills: 1 | Status: SHIPPED | OUTPATIENT
Start: 2024-08-05 | End: 2024-10-04

## 2024-08-05 NOTE — PROGRESS NOTES
Subjective   Patient is a 69 y.o. male who presents with aortic valve disease secondary to { ETIOLOGY:96646}. Current symptoms that the patient reports are {SYMPTOMS:17610}. Echocardiography reveals {ECHO FINDINGS:33062}. Other cardiac history includes {diagnoses; cardiac:60316}. Additional pertinant findings include { FINDINGS:67834}. Cardiac risk factors include {findings; risks cardiac:19757}.    Patient Active Problem List    Diagnosis Date Noted    PONV (postoperative nausea and vomiting) 2024    Postoperative pain 2024    S/P CABG x 2 2024    Ischemic cardiomyopathy 2024    Angina pectoris (CMS-HCC) 2024    Coronary artery disease involving native coronary artery of native heart with unstable angina pectoris (Multi) 2024    Prediabetes 2024    Coronary artery disease (CAD) excluded 2024     Past Medical History:   Diagnosis Date    Hyperlipidemia       Past Surgical History:   Procedure Laterality Date    CARDIAC CATHETERIZATION N/A 2024    Procedure: Left Heart Cath with Coronary Angiography and LV;  Surgeon: Dagoberto Washburn MD;  Location: [facility] Card[facility];  Service: Cardiovascular;  Laterality: N/A;  24--12:30 pm for [facility] [address]--angina I20.9 and CMO I42.9  [facility]  (self [facility]); no PA required    CORONARY ARTERY BYPASS GRAFT  06/28/2024    x2      (Not in a hospital admission)      Review of systems negative except for ***.      Data Review: {icu labs:22845}    ECG: {EC}

## 2024-08-05 NOTE — PROGRESS NOTES
CARDIOTHORACIC SURGERY FOLLOW-UP PROGRESS NOTE  Subjective   Felice Almonte [id number]  8/5/2024    Patient is a 69 y.o. male who presents for routine post-operative follow up. He denies any present complaints. Activity level has been appropriate.       Objective   There were no vitals taken for this visit.  Heart: Regular rate and rhythm, S1, S2 normal, no murmur, click, rub or gallop.  Lungs: Clear to auscultation bilaterally.  Abdomen: Soft, non-distended, non-tender to palpation.  Extremities: Warm, well perfused, pulses intact, no cyanosis, clubbing, or edema.  Neuro: Alert and oriented X4, moving all extremities with no deficits observed.  Incision(s): Well healing without erythema or drainage.  Imaging: Chest x-ray reviewed. Sternum healing well.    Assessment/Plan   There are no diagnoses linked to this encounter.  S/P 2 Vessel MidacbOverall doing well the x-ray and the incision looks quite good.  Need to be followed by his primary physician and his cardiology.  Jose De Jesus Shoemaker MD

## (undated) DEVICE — MARKER, SKIN, RULER AND LABEL PACK, CUSTOM

## (undated) DEVICE — GOWN, SURGICAL, SMARTGOWN, XLARGE, STERILE

## (undated) DEVICE — SHUNT, SENSOR

## (undated) DEVICE — ELECTRODE, ELECTROSURGICAL, BLADE, INSULATED, ENT/IMA, STERILE

## (undated) DEVICE — FILTER, IV, BLOOD, MICROAGGREGATE, 40 MIC, RBC TRANSFUSION

## (undated) DEVICE — OXYGENATOR FX 25, W/HR, ARTERIAL FILTER

## (undated) DEVICE — CLOSURE DEVICE, VASCULAR, MYNX, 5FR

## (undated) DEVICE — SUTURE, PROLENE, 6-0, 30 IN, C-1, CV-11, BLUE

## (undated) DEVICE — SPONGE, LAP, XRAY DECT, 18IN X 18IN, W/MASTER DMT, STERILE

## (undated) DEVICE — SUTURE, VICRYL, 2-0, 36 IN, CT-1, UNDYED

## (undated) DEVICE — DRESSING, ISLAND, ADHESIVE, TELFA, 4 X 8 IN

## (undated) DEVICE — SUTURE, NUROLON, 0, 18 IN, CT1, DETACHABLE, MULTIPACK, BLACK

## (undated) DEVICE — SHUNT, INTRACORONARY, 2.0 MM, CLEARVIEW

## (undated) DEVICE — CATHETER, ANGIO, IMPULSE, FL4, 5 FR X 100 CM

## (undated) DEVICE — MICROCOAGULATION TEST, ACT+ TEST CUVETTE

## (undated) DEVICE — BAG, DECANTER

## (undated) DEVICE — MAYO TRAY, SMALL

## (undated) DEVICE — SUTURE, PROLENE, 7-0, 30 IN, BV1, DA, BLUE

## (undated) DEVICE — ANGIO KIT, LEFT HEART, LF, CUSTOM

## (undated) DEVICE — POSITIONER, STARFISH, EVO

## (undated) DEVICE — CLIPPER, SURGICAL BLADE ASSEMBLY, GENERAL PURPOSE, SINGLE USE

## (undated) DEVICE — Device

## (undated) DEVICE — SPONGE, GAUZE, XRAY DECT, 16 PLY, 4 X 4, W/MASTER DMT,STERILE

## (undated) DEVICE — SYRINGE, 60 CC, IRRIGATION, BULB, CONTRO-BULB, PAPER POUCH

## (undated) DEVICE — ACCESS KIT, MINI MAK, 5FR X 10CM, 0.018 X 40CM, SS/SS, ECHO ENHANCED NEEDLE

## (undated) DEVICE — COVER, CART, 45 X 27 X 48 IN, CLEAR

## (undated) DEVICE — SYSTEM, OCTOPUS NUVO TISSUE STABILIZER

## (undated) DEVICE — SUTURE, SILK, 2-0, 30 IN, SH, CONTROL RELEASE, MULTIPACK, BLACK

## (undated) DEVICE — NEEDLE, MERIT ADVANCE, ONE WALL,  21GA X 7.0CM, STANDARD SMOOTH

## (undated) DEVICE — CLEANER, ELECTROSURGICAL, TIP, 5 X 5 CM, LF

## (undated) DEVICE — PERFUSION SERVICES

## (undated) DEVICE — SENSOR, OXYGEN, CEREBRAL, SOMASENSOR, ADULT

## (undated) DEVICE — DRAPE, SHEET, CARDIOVASCULAR, ANTIMICROBIAL, W/ANESTHESIA SCREEN, IOBAN 2, STERI DRAPE, 107 X 133 IN, DISPOSABLE, FABRIC, BLUE, STERILE

## (undated) DEVICE — TUBE SET, PNEUMOCLEAR, SMOKE EVACU, HIGH-FLOW

## (undated) DEVICE — KNIFE, OPHTHALMIC, SLIT, 22.5 DEG

## (undated) DEVICE — SUTURE, ETHIBOND, XTRA, 30 IN, 0, CT-1, GREEN

## (undated) DEVICE — DRAIN, CHANNEL W/TROCAR 19F

## (undated) DEVICE — DRESSING, ADHESIVE, ISLAND, TELFA, 2 X 3.75 IN, LF

## (undated) DEVICE — TUBING, SUCTION, CARDIAC, 6 FR

## (undated) DEVICE — KIT, COLLECTION, CARDIO

## (undated) DEVICE — CATHETER, DRAINAGE, NASOGASTRIC, DOUBLE LUMEN, FUNNEL END, SUMP, SALEM, 18 FR, 48 IN, PVC, STERILE

## (undated) DEVICE — GEL, ULTRASOUND, AQUASONIC 100, 20 GM, STERILE

## (undated) DEVICE — DRESSING, MEPILEX, BORDER, SACRUM, 8.7 X 9.8 IN

## (undated) DEVICE — SPONGE, HEMOSTATIC, CELLULOSE, SURGICEL, 2 X 14 IN

## (undated) DEVICE — TUBING PACK, OXYGENATOR, ADULT

## (undated) DEVICE — SUTURE, PROLENE, 3-0, 36 IN, SH, DA, BLUE

## (undated) DEVICE — ATS SUCTION LINE

## (undated) DEVICE — SHEATH, PINNACLE, 10 CM,  5FR INTRODUCER, 5FR DIA, 2.5 CM DIALATOR

## (undated) DEVICE — SUTURE, ETHIBOND, 5, 30 IN, V40, MULTIPACK, GREEN

## (undated) DEVICE — PUNCH, AORTIC 4MM

## (undated) DEVICE — DRESSING, MEPILEX, BORDER, HEEL, 8.7 X 9.1 IN

## (undated) DEVICE — 5FR DXTERITY 3DRC DIAGNOSTIC CATHETER, .038 100 CM RADIAL

## (undated) DEVICE — CATHETER, ANGIO, IMPULSE, A MOD, 5 FR X 100 CM

## (undated) DEVICE — ELECTRODE, ELECTROSURGICAL, BLADE EXT 4 INCH, INSULATED

## (undated) DEVICE — SUTURE, VICRYL 0, TAPER POINT, CT-1 VIOLET 27 INCH

## (undated) DEVICE — WASH SET, XTRA, 125ML

## (undated) DEVICE — ELECTRODE, QUICK-COMBO, EDGE SYSTEM, REDI PACK

## (undated) DEVICE — DRAPE, FLUID WARMER

## (undated) DEVICE — DRESSING, ISLAND, TELFA, 4 X 5 IN

## (undated) DEVICE — CANNULA, BIOMEDICUS 23FR, FEMORAL VENOUS

## (undated) DEVICE — DEVICE, ENDOSCOPIC VESSEL HARVESTING, SAPHENA VENAPAX

## (undated) DEVICE — MANIFOLD KIT, CUSTOM, GEAUGA

## (undated) DEVICE — CONNECTOR, CARDIO

## (undated) DEVICE — TRAY, SURESTEP, URINE METER, 14FR, SILICONE

## (undated) DEVICE — APPLICATOR, CHLORAPREP, W/ORANGE TINT, 26ML

## (undated) DEVICE — DEVICE KIT, COR-KNOT MICRO

## (undated) DEVICE — CANNULA, ARTERIAL, BIO-MEDICUS, 17FR, VENTED

## (undated) DEVICE — RETRACTOR, SUTURE, HOLDING, INSERT, OCTOBASE, DISPOSABLE

## (undated) DEVICE — WASH SET, XTRA, 225ML

## (undated) DEVICE — KIT, CELL SAVER, W/COLLECTION SET, 225ML WASH SET

## (undated) DEVICE — TAPE, RETRACT-O, 18GA X 12

## (undated) DEVICE — SUTURE, PROLENE 4-0, TAPER POINT, SH-1 BLUE 30 INCH

## (undated) DEVICE — CLIP, SPRING, BULLDOG, FOGARTY, SOFT JAW, 6 MM, LF

## (undated) DEVICE — KIT, BLOWER / MISTER II

## (undated) DEVICE — OXYGENATOR FX 15, W/HR, ARTERIAL FILTER

## (undated) DEVICE — DRAPE, SHEET, UTILITY, NON ABSORBENT, 18 X 26 IN, LF

## (undated) DEVICE — COLLECTION UNIT, DRAINAGE, THORACIC, SINGLE TUBE, DRY SUCTION, ATS COMPATIBLE, OASIS 3600, LF

## (undated) DEVICE — SYRINGE, 20 CC, LUER LOCK, MONOJECT, W/O CAP, LF

## (undated) DEVICE — MANIFOLD, 4 PORT NEPTUNE STANDARD